# Patient Record
Sex: MALE | Race: WHITE | NOT HISPANIC OR LATINO | Employment: FULL TIME | URBAN - METROPOLITAN AREA
[De-identification: names, ages, dates, MRNs, and addresses within clinical notes are randomized per-mention and may not be internally consistent; named-entity substitution may affect disease eponyms.]

---

## 2017-07-12 ENCOUNTER — OFFICE VISIT (OUTPATIENT)
Dept: ORTHOPEDIC SURGERY | Facility: CLINIC | Age: 72
End: 2017-07-12

## 2017-07-12 VITALS — TEMPERATURE: 98.5 F | WEIGHT: 210 LBS | BODY MASS INDEX: 27.83 KG/M2 | HEIGHT: 73 IN

## 2017-07-12 DIAGNOSIS — M54.5 CHRONIC LOW BACK PAIN, UNSPECIFIED BACK PAIN LATERALITY, WITH SCIATICA PRESENCE UNSPECIFIED: Primary | ICD-10-CM

## 2017-07-12 DIAGNOSIS — G89.29 CHRONIC LOW BACK PAIN, UNSPECIFIED BACK PAIN LATERALITY, WITH SCIATICA PRESENCE UNSPECIFIED: Primary | ICD-10-CM

## 2017-07-12 PROCEDURE — 99204 OFFICE O/P NEW MOD 45 MIN: CPT | Performed by: ORTHOPAEDIC SURGERY

## 2017-07-12 RX ORDER — CARBAMAZEPINE 200 MG/1
100 TABLET ORAL 2 TIMES DAILY
COMMUNITY

## 2017-07-12 RX ORDER — PANTOPRAZOLE SODIUM 20 MG/1
40 TABLET, DELAYED RELEASE ORAL DAILY
COMMUNITY

## 2017-07-12 RX ORDER — ZOLPIDEM TARTRATE 5 MG/1
5 TABLET ORAL
COMMUNITY
End: 2017-08-08

## 2017-07-12 RX ORDER — CLOPIDOGREL BISULFATE 75 MG/1
75 TABLET ORAL DAILY
Status: ON HOLD | COMMUNITY
Start: 2016-12-12 | End: 2021-04-03

## 2017-07-12 RX ORDER — MODAFINIL 200 MG/1
200 TABLET ORAL AS NEEDED
COMMUNITY

## 2017-07-12 RX ORDER — SIMVASTATIN 40 MG
40 TABLET ORAL NIGHTLY
COMMUNITY

## 2017-07-12 NOTE — PROGRESS NOTES
"New patient or new problem visit    Chief Complaint   Patient presents with   • Lumbar Spine - Pain       HPI: Patient is a 72-year-old who complains of severe constant stabbing aching low back pain.  3 or 4 years ago he underwent a laminectomy and then one month later of fusion with instrumentation he states that the active pain started hurting after his first right hip replacement shortly before that.  He complains that the hip was 1 inch short and had to be revised.  In terms did not improve after the revision however.  Dr. Rivas did the  spinal surgery and apparently is no longer available.  He was told there was a \"screw loose\" in his back.  He is been on steroids for pain relief and [facility].  He complains that the pain is excruciating.  It's unclear if he had a \"honeymoon.  \"Initially following the's fusion surgery.    PFSH: See chart- reviewed.  He had a heart attack last December and underwent bypass and was told that he must remain on Plavix 1 year before any elective surgeries.  He needs to be seen by cardiology before any planned surgery to undergo a recommended echocardiogram.  [facility] in should call from today and yesterday.    Review of Systems   Constitutional: Negative for chills, fever and unexpected weight change.   HENT: Negative for trouble swallowing and voice change.    Eyes: Negative for visual disturbance.   Respiratory: Negative for cough and shortness of breath.    Cardiovascular: Negative for chest pain and leg swelling.   Gastrointestinal: Negative for abdominal pain, nausea and vomiting.   Endocrine: Negative for cold intolerance and heat intolerance.   Genitourinary: Negative for difficulty urinating, frequency and urgency.   Skin: Negative for rash and wound.   Allergic/Immunologic: Negative for immunocompromised state.   Neurological: Negative for weakness and numbness.   Hematological: Does not " bruise/bleed easily.   Psychiatric/Behavioral: Negative for dysphoric mood. The patient is not nervous/anxious.        PE: Constitutional: Vital signs above-noted.  Awake, alert and oriented    Psychiatric: Affect and insight do not appear grossly disturbed.  He seems insistestent on being understood that his pain is significant.    Pulmonary: Breathing is unlabored, color is good.    Skin: Warm, dry and normal turgor    Cardiac:  pedal pulses intact.  No edema.    Eyesight and hearing appear adequate for examination purposes    Musculoskeletal:  There is some tenderness to percussion and palpation of the spine. Motion appears stiff.  Posture is unremarkable to coronal and sagittal inspection.    The skin about the area is well healed.  There is no palpable or visible deformity.  There is no local spasm.       Neurologic:   Reflexes are 2+ and symmetrical in the patellae and absent in the Achilles.   Motor function is undisturbed in quadriceps, EHL, and gastrocnemius      Sensation appears symmetrically intact to light touch   .  In the bilateral lower extremities there is no evidence of atrophy.   Clonus is absent..  Gait appears undisturbed.  SLR test negative      MEDICAL DECISION MAKING    XRAY: plain film x-rays of the lumbar spine demonstrate an L3 to 5 fusion with pedicle screw instrumentation around which slight lucency's may be seen.  Slight scoliosis is noted.  I don't see a lot of demonstrable posterior lateral fusion bone.  Hardware appears to be well placed.  Extensive degenerative changes noted at the L2-3 disc space above.  Compared to 2016 images I don't see a substantial change.  CT scan of the lumbar spine suggest a solid facet fusion at L4-5 but I don't see a facet fusion at 3 for.  Again small lucencies around the hardware and noted but these in and of themselves are not dramatic nor necessarily indicative of a nonunion.  Significant stenosis noted.  He brought with him some hip films which show  total hip replacement on the right which appears to be unremarkable in appearance reviewed the radiologist reports which I presume [facility] and a different little from my own.      Other: n/a    Impression: Possible nonunion 34 looks more likely than 45 HEENT never seemed to have a dramatic early relief so it may simply be he's having pain from above below are both.    Plan: at some point an MRI scan may be useful for further evaluation.  He is been advised that he should not stop Plavix for non-emergency interventions until one year after surgery and I see no reason to go against this recommendation and told him I would not.  I believe this condition is stable and I would prefer to wait to order the lumbar MRI closer to the time at which he might be able to undergo intervention.  Presently I cannot help him and  I will see him back in 3 months.

## 2017-07-13 ENCOUNTER — TELEPHONE (OUTPATIENT)
Dept: ORTHOPEDIC SURGERY | Facility: CLINIC | Age: 72
End: 2017-07-13

## 2017-07-14 ENCOUNTER — TELEPHONE (OUTPATIENT)
Dept: ORTHOPEDIC SURGERY | Facility: CLINIC | Age: 72
End: 2017-07-14

## 2017-07-18 ENCOUNTER — TELEPHONE (OUTPATIENT)
Dept: ORTHOPEDIC SURGERY | Facility: CLINIC | Age: 72
End: 2017-07-18

## 2017-07-18 DIAGNOSIS — M54.5 CHRONIC LOW BACK PAIN, UNSPECIFIED BACK PAIN LATERALITY, WITH SCIATICA PRESENCE UNSPECIFIED: Primary | ICD-10-CM

## 2017-07-18 DIAGNOSIS — G89.29 CHRONIC LOW BACK PAIN, UNSPECIFIED BACK PAIN LATERALITY, WITH SCIATICA PRESENCE UNSPECIFIED: Primary | ICD-10-CM

## 2017-07-19 NOTE — TELEPHONE ENCOUNTER
Have contacted [facility] with the [facility] and spoke with the assistant.  I explained situation regarding this patient she has talked to the nurse practitioner and states that it is okay to take the patient off his Plavix for any length of time at our discretion.  As with Dr. Lopez and will go ahead and schedule the patient for an MRI with gadolinium and then will have Dr. Lopez contact the patient after the MRI.  All this information has been discussed with the patient and he agrees

## 2017-07-19 NOTE — TELEPHONE ENCOUNTER
"Call return to the patient.  He's very frustrated because he's having continued pain and is actually [facility] over the last few weeks.  He has discovered that if he lays on his right side he is able to tolerate lying in bed and isn't very little difficulty getting up and down however he is unable lay flat in the bed or on his left side.  Patient is insistent that he get his MRI done to see what's going on.  He would like to go ahead and schedule surgery as soon as possible.  I did question him about him being on the Plavix and the cardiologist recommendation that he remain on the Plavix until December.  Patient does not want to wait till December to have anything done.  \"I can't live like this\" \"I am willing to take any risk including death to get this pain taken care of\" have advised the patient after lengthy discussion that I would need to contact cardiologist to discuss with them about if it is safe to take him off his Plavix and then will discuss with Dr. Lopez.  I will get back with the patient as soon as possible.  "

## 2017-07-24 ENCOUNTER — HOSPITAL ENCOUNTER (OUTPATIENT)
Dept: MRI IMAGING | Facility: HOSPITAL | Age: 72
Discharge: HOME | End: 2017-07-24
Attending: ORTHOPAEDIC SURGERY | Admitting: ORTHOPAEDIC SURGERY

## 2017-07-24 DIAGNOSIS — M54.5 CHRONIC LOW BACK PAIN, UNSPECIFIED BACK PAIN LATERALITY, WITH SCIATICA PRESENCE UNSPECIFIED: ICD-10-CM

## 2017-07-24 DIAGNOSIS — G89.29 CHRONIC LOW BACK PAIN, UNSPECIFIED BACK PAIN LATERALITY, WITH SCIATICA PRESENCE UNSPECIFIED: ICD-10-CM

## 2017-07-24 PROCEDURE — 0 GADOBENATE DIMEGLUMINE 529 MG/ML SOLUTION: Performed by: ORTHOPAEDIC SURGERY

## 2017-07-24 PROCEDURE — 82565 ASSAY OF CREATININE: CPT

## 2017-07-24 PROCEDURE — 72158 MRI LUMBAR SPINE W/O & W/DYE: CPT

## 2017-07-24 PROCEDURE — A9577 INJ MULTIHANCE: HCPCS | Performed by: ORTHOPAEDIC SURGERY

## 2017-07-24 RX ADMIN — GADOBENATE DIMEGLUMINE 20 ML: 529 INJECTION, SOLUTION INTRAVENOUS at 17:00

## 2017-07-25 LAB — CREAT BLDA-MCNC: 1 MG/DL (ref 0.6–1.3)

## 2017-07-28 ENCOUNTER — TELEPHONE (OUTPATIENT)
Dept: ORTHOPEDIC SURGERY | Facility: CLINIC | Age: 72
End: 2017-07-28

## 2017-07-28 NOTE — TELEPHONE ENCOUNTER
----- Message from Marcello Lopez MD sent at 7/26/2017  5:16 PM EDT -----  MRI scan shows stenosis at L2-3 above the level of the fusion, and may represent a point from which leg pain is emanating.  Additionally I'm not sure that there is solid fusion at the previous fusion site at L3-4 and L4-5.  The schedule him for L2-3 repeat laminectomy and fusion with instrumentation, and removal of instrumentation L3 to 5 with exploration of fusion and repaired there if indicated.  I have not entered a case request.

## 2017-07-28 NOTE — TELEPHONE ENCOUNTER
Call return to the patient we discussed his MRI results and he would like to go ahead and proceed with surgery as soon as possible.

## 2017-07-30 ENCOUNTER — PREP FOR SURGERY (OUTPATIENT)
Dept: OTHER | Facility: HOSPITAL | Age: 72
End: 2017-07-30

## 2017-07-30 DIAGNOSIS — M48.061 SPINAL STENOSIS, LUMBAR REGION: Primary | ICD-10-CM

## 2017-07-30 RX ORDER — SODIUM CHLORIDE 0.9 % (FLUSH) 0.9 %
1-10 SYRINGE (ML) INJECTION AS NEEDED
Status: CANCELLED | OUTPATIENT
Start: 2017-08-21

## 2017-07-30 RX ORDER — SODIUM CHLORIDE, SODIUM LACTATE, POTASSIUM CHLORIDE, CALCIUM CHLORIDE 600; 310; 30; 20 MG/100ML; MG/100ML; MG/100ML; MG/100ML
100 INJECTION, SOLUTION INTRAVENOUS CONTINUOUS
Status: CANCELLED | OUTPATIENT
Start: 2017-08-21

## 2017-07-30 RX ORDER — CEFAZOLIN SODIUM 2 G/100ML
2 INJECTION, SOLUTION INTRAVENOUS ONCE
Status: CANCELLED | OUTPATIENT
Start: 2017-08-21 | End: 2017-07-30

## 2017-07-31 ENCOUNTER — TELEPHONE (OUTPATIENT)
Dept: ORTHOPEDIC SURGERY | Facility: CLINIC | Age: 72
End: 2017-07-31

## 2017-07-31 PROBLEM — M48.061 SPINAL STENOSIS, LUMBAR REGION: Status: ACTIVE | Noted: 2017-07-31

## 2017-07-31 NOTE — TELEPHONE ENCOUNTER
All the information regarding his surgery has been discussed with the patient.  He has been instructed to discontinue his Plavix 5 days prior to surgery with his last dose being on August 15

## 2017-08-05 DIAGNOSIS — Z87.891 FORMER SMOKER: ICD-10-CM

## 2017-08-05 DIAGNOSIS — M48.061 SPINAL STENOSIS OF LUMBAR REGION: Primary | ICD-10-CM

## 2017-08-08 ENCOUNTER — APPOINTMENT (OUTPATIENT)
Dept: PREADMISSION TESTING | Facility: HOSPITAL | Age: 72
End: 2017-08-08

## 2017-08-08 VITALS
HEIGHT: 73 IN | TEMPERATURE: 97.8 F | HEART RATE: 66 BPM | OXYGEN SATURATION: 98 % | SYSTOLIC BLOOD PRESSURE: 111 MMHG | WEIGHT: 201 LBS | BODY MASS INDEX: 26.64 KG/M2 | RESPIRATION RATE: 16 BRPM | DIASTOLIC BLOOD PRESSURE: 74 MMHG

## 2017-08-08 LAB
ANION GAP SERPL CALCULATED.3IONS-SCNC: 15.2 MMOL/L
BUN BLD-MCNC: 22 MG/DL (ref 8–23)
BUN/CREAT SERPL: 15.6 (ref 7–25)
CALCIUM SPEC-SCNC: 9.2 MG/DL (ref 8.6–10.5)
CHLORIDE SERPL-SCNC: 102 MMOL/L (ref 98–107)
CO2 SERPL-SCNC: 24.8 MMOL/L (ref 22–29)
CREAT BLD-MCNC: 1.41 MG/DL (ref 0.76–1.27)
DEPRECATED RDW RBC AUTO: 49.9 FL (ref 37–54)
ERYTHROCYTE [DISTWIDTH] IN BLOOD BY AUTOMATED COUNT: 15.5 % (ref 11.5–14.5)
GFR SERPL CREATININE-BSD FRML MDRD: 50 ML/MIN/1.73
GLUCOSE BLD-MCNC: 91 MG/DL (ref 65–99)
HCT VFR BLD AUTO: 40.4 % (ref 40.4–52.2)
HGB BLD-MCNC: 12.2 G/DL (ref 13.7–17.6)
MCH RBC QN AUTO: 26.9 PG (ref 27–32.7)
MCHC RBC AUTO-ENTMCNC: 30.2 G/DL (ref 32.6–36.4)
MCV RBC AUTO: 89.2 FL (ref 79.8–96.2)
PLATELET # BLD AUTO: 275 10*3/MM3 (ref 140–500)
PMV BLD AUTO: 8.7 FL (ref 6–12)
POTASSIUM BLD-SCNC: 4.6 MMOL/L (ref 3.5–5.2)
RBC # BLD AUTO: 4.53 10*6/MM3 (ref 4.6–6)
SODIUM BLD-SCNC: 142 MMOL/L (ref 136–145)
WBC NRBC COR # BLD: 5.24 10*3/MM3 (ref 4.5–10.7)

## 2017-08-08 PROCEDURE — 85027 COMPLETE CBC AUTOMATED: CPT | Performed by: ORTHOPAEDIC SURGERY

## 2017-08-08 PROCEDURE — 80048 BASIC METABOLIC PNL TOTAL CA: CPT | Performed by: ORTHOPAEDIC SURGERY

## 2017-08-08 PROCEDURE — 36415 COLL VENOUS BLD VENIPUNCTURE: CPT

## 2017-08-08 RX ORDER — FEXOFENADINE HCL 180 MG/1
180 TABLET ORAL DAILY
COMMUNITY

## 2017-08-08 RX ORDER — CHOLECALCIFEROL (VITAMIN D3) 125 MCG
500 CAPSULE ORAL DAILY
COMMUNITY

## 2017-08-08 RX ORDER — HYDROCODONE BITARTRATE AND ACETAMINOPHEN 5; 325 MG/1; MG/1
1 TABLET ORAL EVERY 6 HOURS PRN
COMMUNITY
End: 2017-08-25 | Stop reason: HOSPADM

## 2017-08-08 RX ORDER — TERAZOSIN 10 MG/1
10 CAPSULE ORAL NIGHTLY
COMMUNITY

## 2017-08-08 RX ORDER — DOXYCYCLINE HYCLATE 50 MG/1
50 TABLET, DELAYED RELEASE ORAL DAILY
COMMUNITY
End: 2021-04-14 | Stop reason: HOSPADM

## 2017-08-08 NOTE — DISCHARGE INSTRUCTIONS
Take the following medications the morning of surgery with a small sip of water: PANTOPROZOLE, TEGRETOL    ARRIVAL TIME  10:00        General Instructions:  • Do not eat solid food after midnight the night before surgery.  • You may drink clear liquids day of surgery but must stop at least one hour before your hospital arrival time.  • It is beneficial for you to have a clear drink that contains carbohydrates the day of surgery.  We suggest a 20 ounce bottle of Gatorade or Powerade for non-diabetic patients or a 20 ounce bottle of G2 or Powerade Zero for diabetic patients. (Pediatric patients, are not advised to drink a 20 ounce carbohydrate drink)    Clear liquids are liquids you can see through.  Nothing red in color.     Plain water                               Sports drinks  Sodas                                   Gelatin (Jell-O)  Fruit juices without pulp such as white grape juice and apple juice  Popsicles that contain no fruit or yogurt  Tea or coffee (no cream or milk added)  Gatorade / Powerade  G2 / Powerade Zero      • Patients who avoid smoking, chewing tobacco and alcohol for 4 weeks prior to surgery have a reduced risk of post-operative complications.  Quit smoking as many days before surgery as you can.  • Do not smoke, use chewing tobacco or drink alcohol the day of surgery.   • If applicable bring your C-PAP/ BI-PAP machine.  • Bring any papers given to you in the doctor’s office.  • Wear clean comfortable clothes and socks.  • Do not wear contact lenses or make-up.  Bring a case for your glasses.   • Bring crutches or walker if applicable.  • Leave all other valuables and jewelry at home.  • The Pre-Admission Testing nurse will instruct you to bring medications if unable to obtain an accurate list in Pre-Admission Testing.            Preventing a Surgical Site Infection:  • For 2 to 3 days before surgery, avoid shaving with a razor because the razor can irritate skin and make it easier to develop  an infection.  • The night prior to surgery sleep in a clean bed with clean clothing.  Do not allow pets to sleep with you.  • Shower on the morning of surgery using a fresh bar of anti-bacterial soap (such as Dial) and clean washcloth.  Dry with a clean towel and dress in clean clothing.  • Make sure you, your family, and all healthcare providers clean their hands with soap and water or an alcohol based hand  before caring for you or your wound.    Day of surgery:  Upon arrival, a Pre-op nurse and Anesthesiologist will review your health history, obtain vital signs, and answer questions you may have.  The only belongings needed at this time will be your home medications and if applicable your C-PAP/BI-PAP machine.  If you are staying overnight your family can leave the rest of your belongings in the car and bring them to your room later.  A Pre-op nurse will start an IV and you may receive medication in preparation for surgery, including something to help you relax.  Your family will be able to see you in the Pre-op area.  While you are in surgery your family should notify the waiting room  if they leave the waiting room area and provide a contact phone number.    Please be aware that surgery does come with discomfort.  We want to make every effort to control your discomfort so please discuss any uncontrolled symptoms with your nurse.   Your doctor will most likely have prescribed pain medications.      If you are going home after surgery you will receive individualized written care instructions before being discharged.  A responsible adult must drive you to and from the hospital on the day of your surgery and stay with you for 24 hours.    If you are staying overnight following surgery, you will be transported to your hospital room following the recovery period.  [facility] has all private rooms.    If you have any questions please call Pre-Admission Testing at  [phone].  Deductibles and co-payments are collected on the day of service. Please be prepared to pay the required co-pay, deductible or deposit on the day of service as defined by your plan.

## 2017-08-21 ENCOUNTER — ANESTHESIA (OUTPATIENT)
Dept: PERIOP | Facility: HOSPITAL | Age: 72
End: 2017-08-21

## 2017-08-21 ENCOUNTER — HOSPITAL ENCOUNTER (INPATIENT)
Facility: HOSPITAL | Age: 72
LOS: 4 days | Discharge: HOME HEALTH | End: 2017-08-25
Attending: ORTHOPAEDIC SURGERY | Admitting: ORTHOPAEDIC SURGERY

## 2017-08-21 ENCOUNTER — APPOINTMENT (OUTPATIENT)
Dept: GENERAL RADIOLOGY | Facility: HOSPITAL | Age: 72
End: 2017-08-21

## 2017-08-21 ENCOUNTER — ANESTHESIA EVENT (OUTPATIENT)
Dept: PERIOP | Facility: HOSPITAL | Age: 72
End: 2017-08-21

## 2017-08-21 DIAGNOSIS — D50.9 IRON DEFICIENCY ANEMIA, UNSPECIFIED IRON DEFICIENCY ANEMIA TYPE: ICD-10-CM

## 2017-08-21 DIAGNOSIS — M48.061 SPINAL STENOSIS, LUMBAR REGION: ICD-10-CM

## 2017-08-21 DIAGNOSIS — M48.061 SPINAL STENOSIS OF LUMBAR REGION: ICD-10-CM

## 2017-08-21 DIAGNOSIS — R53.1 GENERALIZED WEAKNESS: Primary | ICD-10-CM

## 2017-08-21 LAB
ABO GROUP BLD: NORMAL
BLD GP AB SCN SERPL QL: NEGATIVE
HCT VFR BLD AUTO: 34.8 % (ref 40.4–52.2)
HGB BLD-MCNC: 10.9 G/DL (ref 13.7–17.6)
RH BLD: NEGATIVE

## 2017-08-21 PROCEDURE — 25010000003 CEFAZOLIN IN DEXTROSE 2-4 GM/100ML-% SOLUTION: Performed by: ORTHOPAEDIC SURGERY

## 2017-08-21 PROCEDURE — C1713 ANCHOR/SCREW BN/BN,TIS/BN: HCPCS | Performed by: ORTHOPAEDIC SURGERY

## 2017-08-21 PROCEDURE — 20936 SP BONE AGRFT LOCAL ADD-ON: CPT | Performed by: ORTHOPAEDIC SURGERY

## 2017-08-21 PROCEDURE — 25010000002 MIDAZOLAM PER 1 MG: Performed by: ANESTHESIOLOGY

## 2017-08-21 PROCEDURE — 20930 SP BONE ALGRFT MORSEL ADD-ON: CPT | Performed by: ORTHOPAEDIC SURGERY

## 2017-08-21 PROCEDURE — 86901 BLOOD TYPING SEROLOGIC RH(D): CPT | Performed by: ORTHOPAEDIC SURGERY

## 2017-08-21 PROCEDURE — 85014 HEMATOCRIT: CPT | Performed by: ORTHOPAEDIC SURGERY

## 2017-08-21 PROCEDURE — 25810000003 POTASSIUM CHLORIDE PER 2 MEQ: Performed by: ORTHOPAEDIC SURGERY

## 2017-08-21 PROCEDURE — 25010000002 DEXAMETHASONE PER 1 MG: Performed by: ANESTHESIOLOGY

## 2017-08-21 PROCEDURE — 72100 X-RAY EXAM L-S SPINE 2/3 VWS: CPT

## 2017-08-21 PROCEDURE — 25010000002 FENTANYL CITRATE (PF) 100 MCG/2ML SOLUTION: Performed by: ANESTHESIOLOGY

## 2017-08-21 PROCEDURE — 86900 BLOOD TYPING SEROLOGIC ABO: CPT | Performed by: ORTHOPAEDIC SURGERY

## 2017-08-21 PROCEDURE — 38220 DX BONE MARROW ASPIRATIONS: CPT | Performed by: ORTHOPAEDIC SURGERY

## 2017-08-21 PROCEDURE — 25010000003 CEFAZOLIN PER 500 MG: Performed by: ORTHOPAEDIC SURGERY

## 2017-08-21 PROCEDURE — 25010000002 HYDROMORPHONE PER 4 MG: Performed by: ANESTHESIOLOGY

## 2017-08-21 PROCEDURE — 76000 FLUOROSCOPY <1 HR PHYS/QHP: CPT

## 2017-08-21 PROCEDURE — 22634 ARTHRD CMBN 1NTRSPC EA ADDL: CPT | Performed by: ORTHOPAEDIC SURGERY

## 2017-08-21 PROCEDURE — 25010000002 NEOSTIGMINE PER 0.5 MG: Performed by: ANESTHESIOLOGY

## 2017-08-21 PROCEDURE — 22614 ARTHRD PST TQ 1NTRSPC EA ADD: CPT | Performed by: ORTHOPAEDIC SURGERY

## 2017-08-21 PROCEDURE — 0SG10AJ FUSION OF 2 OR MORE LUMBAR VERTEBRAL JOINTS WITH INTERBODY FUSION DEVICE, POSTERIOR APPROACH, ANTERIOR COLUMN, OPEN APPROACH: ICD-10-PCS | Performed by: ORTHOPAEDIC SURGERY

## 2017-08-21 PROCEDURE — S0260 H&P FOR SURGERY: HCPCS | Performed by: ORTHOPAEDIC SURGERY

## 2017-08-21 PROCEDURE — 0QP004Z REMOVAL OF INTERNAL FIXATION DEVICE FROM LUMBAR VERTEBRA, OPEN APPROACH: ICD-10-PCS | Performed by: ORTHOPAEDIC SURGERY

## 2017-08-21 PROCEDURE — 22842 INSERT SPINE FIXATION DEVICE: CPT | Performed by: ORTHOPAEDIC SURGERY

## 2017-08-21 PROCEDURE — 87075 CULTR BACTERIA EXCEPT BLOOD: CPT | Performed by: ORTHOPAEDIC SURGERY

## 2017-08-21 PROCEDURE — 25010000002 ONDANSETRON PER 1 MG: Performed by: ANESTHESIOLOGY

## 2017-08-21 PROCEDURE — 85018 HEMOGLOBIN: CPT | Performed by: ORTHOPAEDIC SURGERY

## 2017-08-21 PROCEDURE — 86850 RBC ANTIBODY SCREEN: CPT | Performed by: ORTHOPAEDIC SURGERY

## 2017-08-21 PROCEDURE — 22633 ARTHRD CMBN 1NTRSPC LUMBAR: CPT | Performed by: ORTHOPAEDIC SURGERY

## 2017-08-21 PROCEDURE — 07DR3ZZ EXTRACTION OF ILIAC BONE MARROW, PERCUTANEOUS APPROACH: ICD-10-PCS | Performed by: ORTHOPAEDIC SURGERY

## 2017-08-21 PROCEDURE — 25010000002 PROPOFOL 10 MG/ML EMULSION: Performed by: ANESTHESIOLOGY

## 2017-08-21 PROCEDURE — 93010 ELECTROCARDIOGRAM REPORT: CPT | Performed by: INTERNAL MEDICINE

## 2017-08-21 PROCEDURE — 25010000002 HEPARIN (PORCINE) PER 1000 UNITS: Performed by: ORTHOPAEDIC SURGERY

## 2017-08-21 PROCEDURE — 93005 ELECTROCARDIOGRAM TRACING: CPT | Performed by: ORTHOPAEDIC SURGERY

## 2017-08-21 PROCEDURE — 22853 INSJ BIOMECHANICAL DEVICE: CPT | Performed by: ORTHOPAEDIC SURGERY

## 2017-08-21 PROCEDURE — 25010000002 PHENYLEPHRINE PER 1 ML: Performed by: ANESTHESIOLOGY

## 2017-08-21 PROCEDURE — 94799 UNLISTED PULMONARY SVC/PX: CPT

## 2017-08-21 DEVICE — ROD PREBNT SPINE EXPEDIUM TI 5.5X95MM: Type: IMPLANTABLE DEVICE | Site: SPINE LUMBAR | Status: FUNCTIONAL

## 2017-08-21 DEVICE — ALLOGRFT BONE VIVIGEN CELLUAR MATRX FZ 10CC: Type: IMPLANTABLE DEVICE | Site: SPINE LUMBAR | Status: FUNCTIONAL

## 2017-08-21 DEVICE — SCRW INNR EXPEDIUM: Type: IMPLANTABLE DEVICE | Site: SPINE LUMBAR | Status: FUNCTIONAL

## 2017-08-21 DEVICE — SCRW EXPEDIUM PA TI 7.5X45MM: Type: IMPLANTABLE DEVICE | Site: SPINE LUMBAR | Status: FUNCTIONAL

## 2017-08-21 DEVICE — SCRW EXPEDIUM PA TI 7X45MM: Type: IMPLANTABLE DEVICE | Site: SPINE LUMBAR | Status: FUNCTIONAL

## 2017-08-21 DEVICE — SCRW EXPEDIUM PA TI 7X50MM: Type: IMPLANTABLE DEVICE | Site: SPINE LUMBAR | Status: FUNCTIONAL

## 2017-08-21 DEVICE — IMPLANTABLE DEVICE: Type: IMPLANTABLE DEVICE | Site: SPINE LUMBAR | Status: FUNCTIONAL

## 2017-08-21 DEVICE — CAGE CONCORDE BULLET LRD 9X10X21MM: Type: IMPLANTABLE DEVICE | Site: SPINE LUMBAR | Status: FUNCTIONAL

## 2017-08-21 RX ORDER — BISACODYL 10 MG
10 SUPPOSITORY, RECTAL RECTAL DAILY PRN
Status: DISCONTINUED | OUTPATIENT
Start: 2017-08-21 | End: 2017-08-25 | Stop reason: HOSPADM

## 2017-08-21 RX ORDER — POLYETHYLENE GLYCOL 3350 17 G/17G
17 POWDER, FOR SOLUTION ORAL DAILY PRN
Status: DISCONTINUED | OUTPATIENT
Start: 2017-08-21 | End: 2017-08-25 | Stop reason: HOSPADM

## 2017-08-21 RX ORDER — FENTANYL CITRATE 50 UG/ML
50 INJECTION, SOLUTION INTRAMUSCULAR; INTRAVENOUS
Status: DISCONTINUED | OUTPATIENT
Start: 2017-08-21 | End: 2017-08-21 | Stop reason: HOSPADM

## 2017-08-21 RX ORDER — NALOXONE HCL 0.4 MG/ML
0.2 VIAL (ML) INJECTION AS NEEDED
Status: DISCONTINUED | OUTPATIENT
Start: 2017-08-21 | End: 2017-08-21 | Stop reason: HOSPADM

## 2017-08-21 RX ORDER — ONDANSETRON 2 MG/ML
INJECTION INTRAMUSCULAR; INTRAVENOUS AS NEEDED
Status: DISCONTINUED | OUTPATIENT
Start: 2017-08-21 | End: 2017-08-21 | Stop reason: SURG

## 2017-08-21 RX ORDER — CEFAZOLIN SODIUM 2 G/100ML
2 INJECTION, SOLUTION INTRAVENOUS ONCE
Status: COMPLETED | OUTPATIENT
Start: 2017-08-21 | End: 2017-08-21

## 2017-08-21 RX ORDER — LIDOCAINE HYDROCHLORIDE 10 MG/ML
0.5 INJECTION, SOLUTION EPIDURAL; INFILTRATION; INTRACAUDAL; PERINEURAL ONCE AS NEEDED
Status: DISCONTINUED | OUTPATIENT
Start: 2017-08-21 | End: 2017-08-21 | Stop reason: HOSPADM

## 2017-08-21 RX ORDER — MIDAZOLAM HYDROCHLORIDE 1 MG/ML
2 INJECTION INTRAMUSCULAR; INTRAVENOUS
Status: DISCONTINUED | OUTPATIENT
Start: 2017-08-21 | End: 2017-08-21 | Stop reason: HOSPADM

## 2017-08-21 RX ORDER — HYDROMORPHONE HCL 110MG/55ML
PATIENT CONTROLLED ANALGESIA SYRINGE INTRAVENOUS AS NEEDED
Status: DISCONTINUED | OUTPATIENT
Start: 2017-08-21 | End: 2017-08-21 | Stop reason: SURG

## 2017-08-21 RX ORDER — EPHEDRINE SULFATE 50 MG/ML
INJECTION, SOLUTION INTRAVENOUS AS NEEDED
Status: DISCONTINUED | OUTPATIENT
Start: 2017-08-21 | End: 2017-08-21 | Stop reason: SURG

## 2017-08-21 RX ORDER — MIDAZOLAM HYDROCHLORIDE 1 MG/ML
1 INJECTION INTRAMUSCULAR; INTRAVENOUS
Status: DISCONTINUED | OUTPATIENT
Start: 2017-08-21 | End: 2017-08-21 | Stop reason: HOSPADM

## 2017-08-21 RX ORDER — ONDANSETRON 2 MG/ML
4 INJECTION INTRAMUSCULAR; INTRAVENOUS ONCE AS NEEDED
Status: DISCONTINUED | OUTPATIENT
Start: 2017-08-21 | End: 2017-08-21 | Stop reason: HOSPADM

## 2017-08-21 RX ORDER — SODIUM CHLORIDE 0.9 % (FLUSH) 0.9 %
1-10 SYRINGE (ML) INJECTION AS NEEDED
Status: DISCONTINUED | OUTPATIENT
Start: 2017-08-21 | End: 2017-08-21 | Stop reason: HOSPADM

## 2017-08-21 RX ORDER — TERAZOSIN 5 MG/1
5 CAPSULE ORAL NIGHTLY
Status: DISCONTINUED | OUTPATIENT
Start: 2017-08-22 | End: 2017-08-25 | Stop reason: HOSPADM

## 2017-08-21 RX ORDER — SENNA AND DOCUSATE SODIUM 50; 8.6 MG/1; MG/1
1 TABLET, FILM COATED ORAL 2 TIMES DAILY
Status: DISCONTINUED | OUTPATIENT
Start: 2017-08-21 | End: 2017-08-25 | Stop reason: HOSPADM

## 2017-08-21 RX ORDER — DIPHENHYDRAMINE HYDROCHLORIDE 50 MG/ML
12.5 INJECTION INTRAMUSCULAR; INTRAVENOUS
Status: DISCONTINUED | OUTPATIENT
Start: 2017-08-21 | End: 2017-08-21 | Stop reason: HOSPADM

## 2017-08-21 RX ORDER — CARBAMAZEPINE 200 MG/1
200 TABLET ORAL
Status: DISCONTINUED | OUTPATIENT
Start: 2017-08-22 | End: 2017-08-23

## 2017-08-21 RX ORDER — PROMETHAZINE HYDROCHLORIDE 25 MG/1
25 TABLET ORAL ONCE AS NEEDED
Status: DISCONTINUED | OUTPATIENT
Start: 2017-08-21 | End: 2017-08-21 | Stop reason: HOSPADM

## 2017-08-21 RX ORDER — TEMAZEPAM 15 MG/1
15 CAPSULE ORAL NIGHTLY PRN
Status: DISCONTINUED | OUTPATIENT
Start: 2017-08-21 | End: 2017-08-25 | Stop reason: HOSPADM

## 2017-08-21 RX ORDER — LABETALOL HYDROCHLORIDE 5 MG/ML
5 INJECTION, SOLUTION INTRAVENOUS
Status: DISCONTINUED | OUTPATIENT
Start: 2017-08-21 | End: 2017-08-21 | Stop reason: HOSPADM

## 2017-08-21 RX ORDER — PROMETHAZINE HYDROCHLORIDE 25 MG/1
12.5 TABLET ORAL ONCE AS NEEDED
Status: DISCONTINUED | OUTPATIENT
Start: 2017-08-21 | End: 2017-08-21 | Stop reason: HOSPADM

## 2017-08-21 RX ORDER — SODIUM CHLORIDE, SODIUM LACTATE, POTASSIUM CHLORIDE, CALCIUM CHLORIDE 600; 310; 30; 20 MG/100ML; MG/100ML; MG/100ML; MG/100ML
9 INJECTION, SOLUTION INTRAVENOUS CONTINUOUS
Status: DISCONTINUED | OUTPATIENT
Start: 2017-08-21 | End: 2017-08-22

## 2017-08-21 RX ORDER — PROPOFOL 10 MG/ML
VIAL (ML) INTRAVENOUS AS NEEDED
Status: DISCONTINUED | OUTPATIENT
Start: 2017-08-21 | End: 2017-08-21 | Stop reason: SURG

## 2017-08-21 RX ORDER — HYDROCODONE BITARTRATE AND ACETAMINOPHEN 7.5; 325 MG/1; MG/1
1 TABLET ORAL ONCE AS NEEDED
Status: DISCONTINUED | OUTPATIENT
Start: 2017-08-21 | End: 2017-08-21 | Stop reason: HOSPADM

## 2017-08-21 RX ORDER — PROMETHAZINE HYDROCHLORIDE 25 MG/1
25 SUPPOSITORY RECTAL ONCE AS NEEDED
Status: DISCONTINUED | OUTPATIENT
Start: 2017-08-21 | End: 2017-08-21 | Stop reason: HOSPADM

## 2017-08-21 RX ORDER — CEFAZOLIN SODIUM 2 G/100ML
2 INJECTION, SOLUTION INTRAVENOUS EVERY 8 HOURS
Status: COMPLETED | OUTPATIENT
Start: 2017-08-21 | End: 2017-08-22

## 2017-08-21 RX ORDER — FENTANYL CITRATE 50 UG/ML
100 INJECTION, SOLUTION INTRAMUSCULAR; INTRAVENOUS
Status: DISCONTINUED | OUTPATIENT
Start: 2017-08-21 | End: 2017-08-21 | Stop reason: HOSPADM

## 2017-08-21 RX ORDER — HYDROMORPHONE HCL IN 0.9% NACL 10 MG/50ML
PATIENT CONTROLLED ANALGESIA SYRINGE INTRAVENOUS
Status: COMPLETED
Start: 2017-08-21 | End: 2017-08-21

## 2017-08-21 RX ORDER — CYCLOBENZAPRINE HCL 10 MG
10 TABLET ORAL 3 TIMES DAILY PRN
Status: DISCONTINUED | OUTPATIENT
Start: 2017-08-21 | End: 2017-08-25

## 2017-08-21 RX ORDER — ONDANSETRON 4 MG/1
4 TABLET, FILM COATED ORAL EVERY 6 HOURS PRN
Status: DISCONTINUED | OUTPATIENT
Start: 2017-08-21 | End: 2017-08-25 | Stop reason: HOSPADM

## 2017-08-21 RX ORDER — SODIUM CHLORIDE, SODIUM LACTATE, POTASSIUM CHLORIDE, CALCIUM CHLORIDE 600; 310; 30; 20 MG/100ML; MG/100ML; MG/100ML; MG/100ML
100 INJECTION, SOLUTION INTRAVENOUS CONTINUOUS
Status: DISCONTINUED | OUTPATIENT
Start: 2017-08-21 | End: 2017-08-22

## 2017-08-21 RX ORDER — OXYCODONE AND ACETAMINOPHEN 7.5; 325 MG/1; MG/1
1 TABLET ORAL ONCE AS NEEDED
Status: DISCONTINUED | OUTPATIENT
Start: 2017-08-21 | End: 2017-08-21 | Stop reason: HOSPADM

## 2017-08-21 RX ORDER — PROMETHAZINE HYDROCHLORIDE 25 MG/ML
12.5 INJECTION, SOLUTION INTRAMUSCULAR; INTRAVENOUS ONCE AS NEEDED
Status: DISCONTINUED | OUTPATIENT
Start: 2017-08-21 | End: 2017-08-21 | Stop reason: HOSPADM

## 2017-08-21 RX ORDER — MAGNESIUM HYDROXIDE 1200 MG/15ML
LIQUID ORAL AS NEEDED
Status: DISCONTINUED | OUTPATIENT
Start: 2017-08-21 | End: 2017-08-21 | Stop reason: HOSPADM

## 2017-08-21 RX ORDER — DEXAMETHASONE SODIUM PHOSPHATE 10 MG/ML
INJECTION INTRAMUSCULAR; INTRAVENOUS AS NEEDED
Status: DISCONTINUED | OUTPATIENT
Start: 2017-08-21 | End: 2017-08-21 | Stop reason: SURG

## 2017-08-21 RX ORDER — GLYCOPYRROLATE 0.2 MG/ML
INJECTION INTRAMUSCULAR; INTRAVENOUS AS NEEDED
Status: DISCONTINUED | OUTPATIENT
Start: 2017-08-21 | End: 2017-08-21 | Stop reason: SURG

## 2017-08-21 RX ORDER — SODIUM CHLORIDE 0.9 % (FLUSH) 0.9 %
1-10 SYRINGE (ML) INJECTION AS NEEDED
Status: DISCONTINUED | OUTPATIENT
Start: 2017-08-21 | End: 2017-08-25 | Stop reason: HOSPADM

## 2017-08-21 RX ORDER — ONDANSETRON 2 MG/ML
4 INJECTION INTRAMUSCULAR; INTRAVENOUS EVERY 6 HOURS PRN
Status: DISCONTINUED | OUTPATIENT
Start: 2017-08-21 | End: 2017-08-25 | Stop reason: HOSPADM

## 2017-08-21 RX ORDER — OXYCODONE HYDROCHLORIDE AND ACETAMINOPHEN 5; 325 MG/1; MG/1
2 TABLET ORAL EVERY 4 HOURS PRN
Status: DISCONTINUED | OUTPATIENT
Start: 2017-08-21 | End: 2017-08-23

## 2017-08-21 RX ORDER — HYDROMORPHONE HCL IN 0.9% NACL 10 MG/50ML
PATIENT CONTROLLED ANALGESIA SYRINGE INTRAVENOUS CONTINUOUS
Status: DISCONTINUED | OUTPATIENT
Start: 2017-08-21 | End: 2017-08-23

## 2017-08-21 RX ORDER — ONDANSETRON 4 MG/1
4 TABLET, ORALLY DISINTEGRATING ORAL EVERY 6 HOURS PRN
Status: DISCONTINUED | OUTPATIENT
Start: 2017-08-21 | End: 2017-08-25 | Stop reason: HOSPADM

## 2017-08-21 RX ORDER — HYDRALAZINE HYDROCHLORIDE 20 MG/ML
5 INJECTION INTRAMUSCULAR; INTRAVENOUS
Status: DISCONTINUED | OUTPATIENT
Start: 2017-08-21 | End: 2017-08-21 | Stop reason: HOSPADM

## 2017-08-21 RX ORDER — LIDOCAINE HYDROCHLORIDE 20 MG/ML
INJECTION, SOLUTION INFILTRATION; PERINEURAL AS NEEDED
Status: DISCONTINUED | OUTPATIENT
Start: 2017-08-21 | End: 2017-08-21 | Stop reason: SURG

## 2017-08-21 RX ORDER — NALOXONE HCL 0.4 MG/ML
0.1 VIAL (ML) INJECTION
Status: DISCONTINUED | OUTPATIENT
Start: 2017-08-21 | End: 2017-08-23

## 2017-08-21 RX ORDER — ATORVASTATIN CALCIUM 20 MG/1
20 TABLET, FILM COATED ORAL DAILY
Status: DISCONTINUED | OUTPATIENT
Start: 2017-08-22 | End: 2017-08-22

## 2017-08-21 RX ORDER — FLUMAZENIL 0.1 MG/ML
0.2 INJECTION INTRAVENOUS AS NEEDED
Status: DISCONTINUED | OUTPATIENT
Start: 2017-08-21 | End: 2017-08-21 | Stop reason: HOSPADM

## 2017-08-21 RX ORDER — PANTOPRAZOLE SODIUM 40 MG/1
40 TABLET, DELAYED RELEASE ORAL
Status: DISCONTINUED | OUTPATIENT
Start: 2017-08-22 | End: 2017-08-23

## 2017-08-21 RX ORDER — SODIUM CHLORIDE AND POTASSIUM CHLORIDE 150; 450 MG/100ML; MG/100ML
100 INJECTION, SOLUTION INTRAVENOUS CONTINUOUS
Status: DISCONTINUED | OUTPATIENT
Start: 2017-08-21 | End: 2017-08-22

## 2017-08-21 RX ORDER — ROCURONIUM BROMIDE 10 MG/ML
INJECTION, SOLUTION INTRAVENOUS AS NEEDED
Status: DISCONTINUED | OUTPATIENT
Start: 2017-08-21 | End: 2017-08-21 | Stop reason: SURG

## 2017-08-21 RX ORDER — HYDROMORPHONE HYDROCHLORIDE 1 MG/ML
0.5 INJECTION, SOLUTION INTRAMUSCULAR; INTRAVENOUS; SUBCUTANEOUS
Status: DISCONTINUED | OUTPATIENT
Start: 2017-08-21 | End: 2017-08-21 | Stop reason: HOSPADM

## 2017-08-21 RX ORDER — FAMOTIDINE 10 MG/ML
20 INJECTION, SOLUTION INTRAVENOUS ONCE
Status: COMPLETED | OUTPATIENT
Start: 2017-08-21 | End: 2017-08-21

## 2017-08-21 RX ORDER — EPHEDRINE SULFATE 50 MG/ML
5 INJECTION, SOLUTION INTRAVENOUS ONCE AS NEEDED
Status: DISCONTINUED | OUTPATIENT
Start: 2017-08-21 | End: 2017-08-21 | Stop reason: HOSPADM

## 2017-08-21 RX ADMIN — SODIUM CHLORIDE, POTASSIUM CHLORIDE, SODIUM LACTATE AND CALCIUM CHLORIDE: 600; 310; 30; 20 INJECTION, SOLUTION INTRAVENOUS at 13:26

## 2017-08-21 RX ADMIN — PHENYLEPHRINE HYDROCHLORIDE 100 MCG: 10 INJECTION INTRAVENOUS at 13:45

## 2017-08-21 RX ADMIN — FENTANYL CITRATE 50 MCG: 50 INJECTION INTRAMUSCULAR; INTRAVENOUS at 15:56

## 2017-08-21 RX ADMIN — PHENYLEPHRINE HYDROCHLORIDE 100 MCG: 10 INJECTION INTRAVENOUS at 16:20

## 2017-08-21 RX ADMIN — EPHEDRINE SULFATE 10 MG: 50 INJECTION INTRAMUSCULAR; INTRAVENOUS; SUBCUTANEOUS at 13:45

## 2017-08-21 RX ADMIN — FAMOTIDINE 20 MG: 10 INJECTION INTRAVENOUS at 11:01

## 2017-08-21 RX ADMIN — SODIUM CHLORIDE, POTASSIUM CHLORIDE, SODIUM LACTATE AND CALCIUM CHLORIDE: 600; 310; 30; 20 INJECTION, SOLUTION INTRAVENOUS at 14:10

## 2017-08-21 RX ADMIN — Medication: at 18:44

## 2017-08-21 RX ADMIN — ROCURONIUM BROMIDE 50 MG: 10 INJECTION INTRAVENOUS at 13:30

## 2017-08-21 RX ADMIN — CEFAZOLIN SODIUM 2 G: 2 INJECTION, SOLUTION INTRAVENOUS at 23:29

## 2017-08-21 RX ADMIN — ROCURONIUM BROMIDE 20 MG: 10 INJECTION INTRAVENOUS at 15:45

## 2017-08-21 RX ADMIN — SODIUM CHLORIDE, POTASSIUM CHLORIDE, SODIUM LACTATE AND CALCIUM CHLORIDE: 600; 310; 30; 20 INJECTION, SOLUTION INTRAVENOUS at 14:05

## 2017-08-21 RX ADMIN — PHENYLEPHRINE HYDROCHLORIDE 100 MCG: 10 INJECTION INTRAVENOUS at 13:44

## 2017-08-21 RX ADMIN — MIDAZOLAM 1 MG: 1 INJECTION INTRAMUSCULAR; INTRAVENOUS at 11:02

## 2017-08-21 RX ADMIN — HYDROMORPHONE HYDROCHLORIDE 0.5 MG: 2 INJECTION, SOLUTION INTRAMUSCULAR; INTRAVENOUS; SUBCUTANEOUS at 16:44

## 2017-08-21 RX ADMIN — POTASSIUM CHLORIDE AND SODIUM CHLORIDE 100 ML/HR: 450; 150 INJECTION, SOLUTION INTRAVENOUS at 23:29

## 2017-08-21 RX ADMIN — ROCURONIUM BROMIDE 10 MG: 10 INJECTION INTRAVENOUS at 16:22

## 2017-08-21 RX ADMIN — SODIUM CHLORIDE, POTASSIUM CHLORIDE, SODIUM LACTATE AND CALCIUM CHLORIDE 9 ML/HR: 600; 310; 30; 20 INJECTION, SOLUTION INTRAVENOUS at 11:01

## 2017-08-21 RX ADMIN — FENTANYL CITRATE 50 MCG: 50 INJECTION INTRAMUSCULAR; INTRAVENOUS at 13:19

## 2017-08-21 RX ADMIN — ROCURONIUM BROMIDE 10 MG: 10 INJECTION INTRAVENOUS at 17:07

## 2017-08-21 RX ADMIN — FENTANYL CITRATE 50 MCG: 50 INJECTION INTRAMUSCULAR; INTRAVENOUS at 19:07

## 2017-08-21 RX ADMIN — ROCURONIUM BROMIDE 10 MG: 10 INJECTION INTRAVENOUS at 16:43

## 2017-08-21 RX ADMIN — NEOSTIGMINE METHYLSULFATE 3 MG: 1 INJECTION INTRAMUSCULAR; INTRAVENOUS; SUBCUTANEOUS at 17:44

## 2017-08-21 RX ADMIN — DEXAMETHASONE SODIUM PHOSPHATE 8 MG: 10 INJECTION INTRAMUSCULAR; INTRAVENOUS at 14:00

## 2017-08-21 RX ADMIN — FENTANYL CITRATE 100 MCG: 50 INJECTION INTRAMUSCULAR; INTRAVENOUS at 13:30

## 2017-08-21 RX ADMIN — ROCURONIUM BROMIDE 30 MG: 10 INJECTION INTRAVENOUS at 14:22

## 2017-08-21 RX ADMIN — OXYCODONE HYDROCHLORIDE AND ACETAMINOPHEN 2 TABLET: 5; 325 TABLET ORAL at 21:08

## 2017-08-21 RX ADMIN — LIDOCAINE HYDROCHLORIDE 50 MG: 20 INJECTION, SOLUTION INFILTRATION; PERINEURAL at 13:30

## 2017-08-21 RX ADMIN — EPHEDRINE SULFATE 10 MG: 50 INJECTION INTRAMUSCULAR; INTRAVENOUS; SUBCUTANEOUS at 13:50

## 2017-08-21 RX ADMIN — HYDROMORPHONE HYDROCHLORIDE 0.5 MG: 1 INJECTION, SOLUTION INTRAMUSCULAR; INTRAVENOUS; SUBCUTANEOUS at 19:31

## 2017-08-21 RX ADMIN — HYDROMORPHONE HYDROCHLORIDE 0.5 MG: 2 INJECTION, SOLUTION INTRAMUSCULAR; INTRAVENOUS; SUBCUTANEOUS at 17:43

## 2017-08-21 RX ADMIN — FENTANYL CITRATE 100 MCG: 50 INJECTION INTRAMUSCULAR; INTRAVENOUS at 14:21

## 2017-08-21 RX ADMIN — FENTANYL CITRATE 50 MCG: 50 INJECTION INTRAMUSCULAR; INTRAVENOUS at 18:43

## 2017-08-21 RX ADMIN — GLYCOPYRROLATE 0.4 MG: 0.2 INJECTION INTRAMUSCULAR; INTRAVENOUS at 17:44

## 2017-08-21 RX ADMIN — HYDROMORPHONE HYDROCHLORIDE 0.25 MG: 2 INJECTION, SOLUTION INTRAMUSCULAR; INTRAVENOUS; SUBCUTANEOUS at 17:28

## 2017-08-21 RX ADMIN — SODIUM CHLORIDE, POTASSIUM CHLORIDE, SODIUM LACTATE AND CALCIUM CHLORIDE: 600; 310; 30; 20 INJECTION, SOLUTION INTRAVENOUS at 15:05

## 2017-08-21 RX ADMIN — PROPOFOL 200 MG: 10 INJECTION, EMULSION INTRAVENOUS at 13:30

## 2017-08-21 RX ADMIN — EPHEDRINE SULFATE 10 MG: 50 INJECTION INTRAMUSCULAR; INTRAVENOUS; SUBCUTANEOUS at 13:41

## 2017-08-21 RX ADMIN — ONDANSETRON 4 MG: 2 INJECTION INTRAMUSCULAR; INTRAVENOUS at 17:39

## 2017-08-21 RX ADMIN — PHENYLEPHRINE HYDROCHLORIDE 100 MCG: 10 INJECTION INTRAVENOUS at 16:11

## 2017-08-21 RX ADMIN — HYDROMORPHONE HYDROCHLORIDE 0.25 MG: 2 INJECTION, SOLUTION INTRAMUSCULAR; INTRAVENOUS; SUBCUTANEOUS at 17:08

## 2017-08-21 RX ADMIN — CEFAZOLIN SODIUM 2 G: 2 INJECTION, SOLUTION INTRAVENOUS at 13:26

## 2017-08-21 RX ADMIN — PHENYLEPHRINE HYDROCHLORIDE 100 MCG: 10 INJECTION INTRAVENOUS at 16:05

## 2017-08-21 RX ADMIN — PHENYLEPHRINE HYDROCHLORIDE 100 MCG: 10 INJECTION INTRAVENOUS at 13:50

## 2017-08-21 RX ADMIN — FENTANYL CITRATE 50 MCG: 50 INJECTION INTRAMUSCULAR; INTRAVENOUS at 11:02

## 2017-08-21 RX ADMIN — HYDROMORPHONE HYDROCHLORIDE 0.5 MG: 2 INJECTION, SOLUTION INTRAMUSCULAR; INTRAVENOUS; SUBCUTANEOUS at 17:57

## 2017-08-21 NOTE — ANESTHESIA POSTPROCEDURE EVALUATION
"Patient: Regan Cavazos    Procedure Summary     Date Anesthesia Start Anesthesia Stop Room / Location    08/21/17 1326 1816  [facility] / [facility]       Procedure Diagnosis Surgeon Provider    L2-L4 Repeat Laminectomy, L2-L4 TLIF with cage L2-L5  Fusion with instrumentation and L3-L5 removal of instrumentation. (N/A Spine Lumbar) Spinal stenosis, lumbar region  (Spinal stenosis, lumbar region [M48.06]) MD Sarmad Pro MD          Anesthesia Type: general  Last vitals  BP   115/58 (08/21/17 1935)    Temp        Pulse   59 (08/21/17 1935)   Resp   16 (08/21/17 1935)    SpO2   95 % (08/21/17 1935)      Post Anesthesia Care and Evaluation    Patient location during evaluation: PACU  Patient participation: complete - patient participated  Level of consciousness: awake  Pain management: adequate  Airway patency: patent  Anesthetic complications: No anesthetic complications    Cardiovascular status: acceptable  Respiratory status: acceptable  Hydration status: acceptable    Comments: /58 (BP Location: Left arm, Patient Position: Lying)  Pulse 59  Temp 36.7 °C (98 °F) (Oral)   Resp 16  Ht 73\" (185.4 cm)  Wt 202 lb 11.2 oz (91.9 kg)  SpO2 95%  BMI 26.74 kg/m2      "

## 2017-08-21 NOTE — OP NOTE
Operative note    Pre-op diagnosis: L3-4 L4-5 pseudoarthrosis status post L3 to 5 laminectomy and fusion with instrumentation, L 2-3, 3-4 recurrent spinal stenosis    Postoperative diagnosis: Same, grossly unstable pseudoarthrosis at L34, minimal but perceptible motion at L4 5    Procedure: L2-3,3-4 repeat laminectomy for decompression, L2 to L5 bilateral posterior lateral fusion with AcroMed expedient segmental instrumentation, left L 2-3 and L3-4 transforaminal lumbar interbody fusion with Concorde bullet carbon fiber cage, removal of segmental instrumentation L3 to L5 with local bone graft, right iliac marrow aspiration, andVivogen bone graft substitute.    Surgeon: Marcello Lopez Jr, M.D.    Asst.: Carol Rodas    EBL: 750 cc    Anesthetic: Gen.    Condition: Satisfactory    Specimen: Culture at to microbiology for Gram stain and CNS (no obvious gross evidence of infection)    Description of procedure: Patient was anesthetized and positioned prone.  Bony prominences were well padded.  The back was prepped and draped in sterile fashion.  The prior scar was elliptically excised about 10 cm in length.  The muscle was stripped subperiosteally to the tips of transverse processes L2 and the hardware at L3 to L5.  Curettes and rongeurs removed adherent soft tissue.  The hardware was removed without event.  The L3 screws were mildly loose.  The L4 screws were tight.  The L5 screws were grossly loose.  Interestingly after curetting the graft bed appeared to be an abundance of bone which was not in continuity and there was a grossly mobile nonunion at L3-4, but there was only barely perceptible motion at L4 5.  Packs were placed for hemostasis.  The graft was decorticated.  A Steffee probe was inserted at the intersection of the anatomic landmarks at L2-3..  A flexible probe was inserted to check the integrity of the pedicle.  Replaced at L2 and replaced in the holes below from L3 to L5 using larger screws even up to 8  mm.  Contoured rods were later added, nuts were tightened and torqued.  Biplanar image intensification showed appropriate screw position and anatomic level and satisfactory posture.  I performed a laminectomy for decompression.   The interspinous ligament was excised at L2-3.  The upper lamina was removed  out to within 8-10 mm of the pars intra-articularis.  A small portion of the cephalad aspect of the caudal lamina was excised with a Kerrison rongeur.  Medial facetectomies were performed bilaterally using Kerrison rongeur and osteotomes.  A high-speed geetha was used as well.  Foraminotomies were accomplished with a foraminotomy rongeur.   The disc was determined to be not impinging and stable and was not excised.  This process was repeated at the adjacent levels in identical fashion,, except that extensive scarring required careful elevation of the dura scar from the lateral elements..  Upon completion of the decompression I could pass a ball probe through the affected foramina, and easily retract  the nerve roots  toward the midline.  Bleeding was controlled with bipolar cautery,and Gelfoam with thrombin and/ or FloSeal hemostatic matrix.  No dural trauma was sustained, and no CSF leakage was noted.  I was unable to carry out the decompression down into L4 5 which was simply too densely scarred to the laminar and the lateral elements sidewalls.  Additionally there was a least evidence of neurologic compression at this level and the least need for interbody fusion.  An annulotomy was accomplished with a knife and pituitary rongeur after facetectomy on the left at L2-3.  Sequential  scrapers, along with curettage were used to remove disc debris down to bleeding subchondral and plate bone.  The passing root was protected medially with a bayonet nerve root retractor.  The exiting root was protected above.  A self-retaining distractor was placed and engaged.  Disc debris was adequately removed.  Now the  appropriate-sized cage was selected, matching the final scraper size.  The sponge, which had been prepared at the back table, was packed into the Concorde bullet carbon fiber cage.  Additional sponge was placed in the anterior aspect of the disc space.  The cage was then tamped into position and finally seated.  The distraction was removed and the cage fit was excellent.  This was repeated at L3-4.  No neurologic structures were impinged or significantly stretched.  Biplanar images showed satisfactory position of the cage and of course appropriate anatomic level.  Bone marrow was obtained from the right iliac crest.  The marrow was applied to the bone graft and the Vivogen bone graft extender.  Laminectomy bone, and bone from decortication of the graft bed was cleaned at the back table throughout the case and available for bone graft.  The morcellized bone was placed in the decorticated lateral gutter bilaterally. .  Hemostasis was assured.  The wound was then closed with running and interrupted #1 Vicryl for the dorsal fascia, 2-0 Vicryl subcutaneously, then 4-0 Monocryl and Dermabond for the skin.  A sterile dressing was applied.  The patient is about to be recovered.

## 2017-08-21 NOTE — H&P
"   []Hide copied text  New patient or new problem visit         Chief Complaint   Patient presents with   • Lumbar Spine - Pain         HPI: Patient is a 72-year-old who complains of severe constant stabbing aching low back pain.  3 or 4 years ago he underwent a laminectomy and then one month later of fusion with instrumentation he states that the active pain started hurting after his first right hip replacement shortly before that.  He complains that the hip was 1 inch short and had to be revised.  In terms did not improve after the revision however.  Dr. Rivas did the  spinal surgery and apparently is no longer available.  He was told there was a \"screw loose\" in his back.  He is been on steroids for pain relief and [facility].  He complains that the pain is excruciating.  It's unclear if he had a \"honeymoon.  \"Initially following the's fusion surgery.     PFSH: See chart- reviewed.  He had a heart attack last December and underwent bypass and was told that he must remain on Plavix 1 year before any elective surgeries.  He needs to be seen by cardiology before any planned surgery to undergo a recommended echocardiogram.  [facility] contacts are listed in should call from today and yesterday.     Review of Systems   Constitutional: Negative for chills, fever and unexpected weight change.   HENT: Negative for trouble swallowing and voice change.    Eyes: Negative for visual disturbance.   Respiratory: Negative for cough and shortness of breath.    Cardiovascular: Negative for chest pain and leg swelling.   Gastrointestinal: Negative for abdominal pain, nausea and vomiting.   Endocrine: Negative for cold intolerance and heat intolerance.   Genitourinary: Negative for difficulty urinating, frequency and urgency.   Skin: Negative for rash and wound.   Allergic/Immunologic: Negative for immunocompromised state.   Neurological: Negative for weakness and numbness. "   Hematological: Does not bruise/bleed easily.   Psychiatric/Behavioral: Negative for dysphoric mood. The patient is not nervous/anxious.          PE: Constitutional: Vital signs above-noted.  Awake, alert and oriented     Psychiatric: Affect and insight do not appear grossly disturbed.  He seems insistestent on being understood that his pain is significant.     Pulmonary: Breathing is unlabored, color is good.     Skin: Warm, dry and normal turgor     Cardiac:  pedal pulses intact.  No edema.     Eyesight and hearing appear adequate for examination purposes     Musculoskeletal:  There is some tenderness to percussion and palpation of the spine. Motion appears stiff.  Posture is unremarkable to coronal and sagittal inspection.    The skin about the area is well healed.  There is no palpable or visible deformity.  There is no local spasm.       Neurologic:   Reflexes are 2+ and symmetrical in the patellae and absent in the Achilles.   Motor function is undisturbed in quadriceps, EHL, and gastrocnemius      Sensation appears symmetrically intact to light touch   .  In the bilateral lower extremities there is no evidence of atrophy.   Clonus is absent..  Gait appears undisturbed.  SLR test negative        MEDICAL DECISION MAKING     XRAY: plain film x-rays of the lumbar spine demonstrate an L3 to 5 fusion with pedicle screw instrumentation around which slight lucency's may be seen.  Slight scoliosis is noted.  I don't see a lot of demonstrable posterior lateral fusion bone.  Hardware appears to be well placed.  Extensive degenerative changes noted at the L2-3 disc space above.  Compared to 2016 images I don't see a substantial change.  CT scan of the lumbar spine suggest a solid facet fusion at L4-5 but I don't see a facet fusion at 3 for.  Again small lucencies around the hardware and noted but these in and of themselves are not dramatic nor necessarily indicative of a nonunion.  Significant stenosis noted.  He  brought with him some hip films which show total hip replacement on the right which appears to be unremarkable in appearance reviewed the radiologist reports which I [facility] and a different little from my own.       Other: MRI demonstrates L2-3 severe stenosis with good decompression at L3 4 and L4-5.  I can't comment on the state of the fusion based on the MRI.     Impression: Possible nonunion 34 looks more likely than 45 HEENT never seemed to have a dramatic early relief so it may simply be he's having pain from above below are both        Plan:  I plan L2 3 repeat laminectomy and fusion with instrumentation I will remove the hardware from 3-5 and fix the nonunion is present.  Risk and benefits were discussed in great detail.I discussed the risks and benefits of posterior spinal fusion, including where applicable, laminectomy.  Risks include adverse anesthetic events such as death, stroke and myocardial infarction.  More specific surgical risks include infection, nonunion, hardware failure, spinal fluid leakage, nerve root injury or paralysis, visceral or vascular injury, persistent pain, deep venous thrombosis, and pulmonary embolism among others. There is a 70 to 90 % chance of success.   Alternatives have been discussed.  After careful consideration the patient wishes to proceed with surgery.

## 2017-08-21 NOTE — ANESTHESIA PREPROCEDURE EVALUATION
Anesthesia Evaluation     Patient summary reviewed and Nursing notes reviewed   NPO Solid Status: > 8 hours       Airway   Mallampati: II  TM distance: >3 FB  Neck ROM: full  no difficulty expected  Dental - normal exam     Pulmonary - negative pulmonary ROS and normal exam   Cardiovascular - normal exam    (+) CAD,     ROS comment: No cp    Neuro/Psych  (+) seizures,    GI/Hepatic/Renal/Endo - negative ROS     Musculoskeletal (-) negative ROS    Abdominal  - normal exam   Substance History - negative use     OB/GYN negative ob/gyn ROS         Other                                        Anesthesia Plan    ASA 3     general     intravenous induction   Anesthetic plan and risks discussed with patient.    Plan discussed with CRNA.

## 2017-08-21 NOTE — ANESTHESIA PROCEDURE NOTES
Airway  Urgency: elective    Date/Time: 8/21/2017 1:37 PM  Airway not difficult    General Information and Staff    Patient location during procedure: OR  Anesthesiologist: FÁTIMA SAL    Indications and Patient Condition  Indications for airway management: airway protection    Preoxygenated: yes      Final Airway Details  Final airway type: endotracheal airway      Successful airway: ETT and reinforced tube    Successful intubation technique: direct laryngoscopy  Endotracheal tube insertion site: oral  Blade: Atul  Blade size: #3  ETT size: 8.0 mm  Cormack-Lehane Classification: grade I - full view of glottis  Placement verified by: chest auscultation and capnometry   Measured from: lips  ETT to lips (cm): 21  Number of attempts at approach: 1    Additional Comments  Atraumatic tonny # 3 mac 8.0 reinforced x1  ebe ebbs tube sec vent cont eyes taped

## 2017-08-21 NOTE — PLAN OF CARE
Problem: Patient Care Overview (Adult)  Goal: Plan of Care Review  Outcome: Ongoing (interventions implemented as appropriate)    08/21/17 1043   Coping/Psychosocial Response Interventions   Plan Of Care Reviewed With patient   Patient Care Overview   Progress no change       Goal: Adult Individualization and Mutuality  Outcome: Ongoing (interventions implemented as appropriate)    08/21/17 1043   Individualization   Patient Specific Preferences call Ye   Patient Specific Goals no pain after I have had surgery and healed   Patient Specific Interventions teach S&S of infection   Mutuality/Individual Preferences   What Anxieties, Fears or Concerns Do You Have About Your Health or Care? none   What Questions Do You Have About Your Health or Care? none   What Information Would Help Us Give You More Personalized Care? none       Goal: Discharge Needs Assessment  Outcome: Ongoing (interventions implemented as appropriate)    08/21/17 1043   Discharge Needs Assessment   Concerns To Be Addressed denies needs/concerns at this time   Readmission Within The Last 30 Days no previous admission in last 30 days   Current Health   Anticipated Changes Related to Illness none   Self-Care   Equipment Currently Used at Home none         Problem: Perioperative Period (Adult)  Goal: Signs and Symptoms of Listed Potential Problems Will be Absent or Manageable (Perioperative Period)  Outcome: Ongoing (interventions implemented as appropriate)    08/21/17 1043   Perioperative Period   Problems Assessed (Perioperative Period) pain;hypoxia/hypoxemia;situational response   Problems Present (Perioperative Period) pain

## 2017-08-22 LAB
ANION GAP SERPL CALCULATED.3IONS-SCNC: 10.2 MMOL/L
BUN BLD-MCNC: 13 MG/DL (ref 8–23)
BUN/CREAT SERPL: 13 (ref 7–25)
CALCIUM SPEC-SCNC: 7.7 MG/DL (ref 8.6–10.5)
CHLORIDE SERPL-SCNC: 102 MMOL/L (ref 98–107)
CO2 SERPL-SCNC: 24.8 MMOL/L (ref 22–29)
CREAT BLD-MCNC: 1 MG/DL (ref 0.76–1.27)
GFR SERPL CREATININE-BSD FRML MDRD: 74 ML/MIN/1.73
GLUCOSE BLD-MCNC: 113 MG/DL (ref 65–99)
HCT VFR BLD AUTO: 29.6 % (ref 40.4–52.2)
HGB BLD-MCNC: 9.3 G/DL (ref 13.7–17.6)
POTASSIUM BLD-SCNC: 4.8 MMOL/L (ref 3.5–5.2)
SODIUM BLD-SCNC: 137 MMOL/L (ref 136–145)

## 2017-08-22 PROCEDURE — 99024 POSTOP FOLLOW-UP VISIT: CPT | Performed by: ORTHOPAEDIC SURGERY

## 2017-08-22 PROCEDURE — 80048 BASIC METABOLIC PNL TOTAL CA: CPT | Performed by: ORTHOPAEDIC SURGERY

## 2017-08-22 PROCEDURE — 97162 PT EVAL MOD COMPLEX 30 MIN: CPT

## 2017-08-22 PROCEDURE — 25010000003 CEFAZOLIN IN DEXTROSE 2-4 GM/100ML-% SOLUTION: Performed by: ORTHOPAEDIC SURGERY

## 2017-08-22 PROCEDURE — 85014 HEMATOCRIT: CPT | Performed by: ORTHOPAEDIC SURGERY

## 2017-08-22 PROCEDURE — 85018 HEMOGLOBIN: CPT | Performed by: ORTHOPAEDIC SURGERY

## 2017-08-22 PROCEDURE — 94799 UNLISTED PULMONARY SVC/PX: CPT

## 2017-08-22 PROCEDURE — 97110 THERAPEUTIC EXERCISES: CPT

## 2017-08-22 RX ORDER — ATORVASTATIN CALCIUM 20 MG/1
20 TABLET, FILM COATED ORAL NIGHTLY
Status: DISCONTINUED | OUTPATIENT
Start: 2017-08-22 | End: 2017-08-25 | Stop reason: HOSPADM

## 2017-08-22 RX ORDER — ALUMINA, MAGNESIA, AND SIMETHICONE 2400; 2400; 240 MG/30ML; MG/30ML; MG/30ML
30 SUSPENSION ORAL EVERY 6 HOURS PRN
Status: DISCONTINUED | OUTPATIENT
Start: 2017-08-22 | End: 2017-08-25 | Stop reason: HOSPADM

## 2017-08-22 RX ORDER — SODIUM CHLORIDE 9 MG/ML
100 INJECTION, SOLUTION INTRAVENOUS CONTINUOUS
Status: DISCONTINUED | OUTPATIENT
Start: 2017-08-22 | End: 2017-08-25 | Stop reason: HOSPADM

## 2017-08-22 RX ADMIN — OXYCODONE HYDROCHLORIDE AND ACETAMINOPHEN 2 TABLET: 5; 325 TABLET ORAL at 04:21

## 2017-08-22 RX ADMIN — OXYCODONE HYDROCHLORIDE AND ACETAMINOPHEN 2 TABLET: 5; 325 TABLET ORAL at 20:50

## 2017-08-22 RX ADMIN — OXYCODONE HYDROCHLORIDE AND ACETAMINOPHEN 2 TABLET: 5; 325 TABLET ORAL at 09:17

## 2017-08-22 RX ADMIN — TERAZOSIN HYDROCHLORIDE 5 MG: 5 CAPSULE ORAL at 16:52

## 2017-08-22 RX ADMIN — SODIUM CHLORIDE 100 ML/HR: 9 INJECTION, SOLUTION INTRAVENOUS at 10:45

## 2017-08-22 RX ADMIN — CEFAZOLIN SODIUM 2 G: 2 INJECTION, SOLUTION INTRAVENOUS at 05:28

## 2017-08-22 RX ADMIN — ALUMINUM HYDROXIDE, MAGNESIUM HYDROXIDE, AND DIMETHICONE 30 ML: 400; 400; 40 SUSPENSION ORAL at 15:01

## 2017-08-22 RX ADMIN — OXYCODONE HYDROCHLORIDE AND ACETAMINOPHEN 2 TABLET: 5; 325 TABLET ORAL at 14:16

## 2017-08-22 RX ADMIN — ATORVASTATIN CALCIUM 20 MG: 20 TABLET, FILM COATED ORAL at 20:50

## 2017-08-22 RX ADMIN — CYCLOBENZAPRINE HYDROCHLORIDE 10 MG: 10 TABLET, FILM COATED ORAL at 22:39

## 2017-08-22 RX ADMIN — CARBAMAZEPINE 200 MG: 200 TABLET ORAL at 09:18

## 2017-08-22 RX ADMIN — PANTOPRAZOLE SODIUM 40 MG: 40 TABLET, DELAYED RELEASE ORAL at 05:28

## 2017-08-22 RX ADMIN — SODIUM CHLORIDE 100 ML/HR: 9 INJECTION, SOLUTION INTRAVENOUS at 21:38

## 2017-08-22 RX ADMIN — ALUMINUM HYDROXIDE, MAGNESIUM HYDROXIDE, AND DIMETHICONE 30 ML: 400; 400; 40 SUSPENSION ORAL at 20:50

## 2017-08-22 RX ADMIN — DOCUSATE SODIUM -SENNOSIDES 1 TABLET: 50; 8.6 TABLET, COATED ORAL at 09:17

## 2017-08-22 NOTE — PROGRESS NOTES
Discharge Planning Assessment  [facility]     Patient Name: Regan Cavazos  MRN: [id number]  Today's Date: 8/22/2017    Admit Date: 8/21/2017          Discharge Needs Assessment       08/22/17 1300    Living Environment    Lives With spouse   fiance    Living Arrangements house    Home Accessibility no concerns    Transportation Available car;family or friend will provide    Living Environment    Provides Primary Care For no one    Discharge Needs Assessment    Concerns To Be Addressed no discharge needs identified;denies needs/concerns at this time    Equipment Currently Used at Home cane, straight    Equipment Needed After Discharge walker, rolling;commode    Current Discharge Risk physical impairment    Discharge Disposition home or self-care            Discharge Plan       08/22/17 1300    Case Management/Social Work Plan    Plan Home w/ family to assist as needed; denies any needs at this time.      Patient/Family In Agreement With Plan yes    Additional Comments Met w/ patient in room.  Introduced self and explained ccp role.  Facesheet verified/corrected.  [facility]  He is engaged to harley Araiza.  Plan is home w/ family assistance.  Discussed poss need for DME: walker/commode.  Awaiting PT assessment (has only been up to chair today).          Discharge Placement     No information found                Demographic Summary       08/22/17 1301    Primary Care Physician Information    Name Dr Milagros Goodman [address]     Phone [phone]    Fax [phone]      08/22/17 1300    Referral Information    Admission Type inpatient    Arrived From admitted as an inpatient;still a patient    Reason For Consult care coordination/care conference;discharge planning    Contact Information    Permission Granted to Share Information With family/designee            Functional Status       08/22/17 1300    Functional Status Current    Ambulation 3-->assistive equipment and  person    Transferring 3-->assistive equipment and person    Toileting 3-->assistive equipment and person    Bathing 3-->assistive equipment and person    Dressing 3-->assistive equipment and person    Eating 0-->independent    Communication 0-->understands/communicates without difficulty    Swallowing (if score 2 or more for any item, consult Rehab Services) 0-->swallows foods/liquids without difficulty    Functional Status Prior    Ambulation 0-->independent    Transferring 0-->independent    Toileting 0-->independent    Bathing 0-->independent    Dressing 0-->independent    Eating 0-->independent    Communication 0-->understands/communicates without difficulty    Swallowing 0-->swallows foods/liquids without difficulty    Activity Tolerance    Current Activity Limitations none;spinal precautions    Usual Activity Tolerance excellent    Current Activity Tolerance good    Cognitive/Perceptual/Developmental    Current Mental Status/Cognitive Functioning no deficits noted                   Mana Drummond, RN

## 2017-08-22 NOTE — PROGRESS NOTES
Postop day 1: AVSS awake alert oriented.  No leg pain expected back pain.  Moves legs well.  Hemoglobin 9.3.  Doing well home in a day or 2.
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
## 2017-08-22 NOTE — PROGRESS NOTES
Name: Regan Cavazos ADMIT: 2017   : 1945  PCP: Milagros Goodman MD    MRN: [id number] LOS: 1 days   AGE/SEX: 72 y.o. male  ROOM: [facility]   Subjective   Subjective  Cc- back pain  Pain moderate-severe  Not currently releived with pain medication  Symptoms of needing to pee though he has bailey in still    ROS  No f/c  No n/v  Objective   Vital Signs  Temp:  [97.4 °F (36.3 °C)-98.1 °F (36.7 °C)] 97.9 °F (36.6 °C)  Heart Rate:  [57-94] 59  Resp:  [16-20] 16  BP: (100-130)/(53-80) 100/53  SpO2:  [94 %-100 %] 99 %  on  Flow (L/min):  [2-4] 2;   O2 Device: nasal cannula  Body mass index is 26.74 kg/(m^2).    Physical Exam    Alert, in mild pain  RRR  Lungs clear decreased bs at bases  Soft, nt  No edema  +bailey    Results Review:       I reviewed the patient's new clinical results.    Results from last 7 days  Lab Units 17  0536 17  2257   HEMOGLOBIN g/dL 9.3* 10.9*     Results from last 7 days  Lab Units 17  0536   SODIUM mmol/L 137   POTASSIUM mmol/L 4.8   CHLORIDE mmol/L 102   CO2 mmol/L 24.8   BUN mg/dL 13   CREATININE mg/dL 1.00   GLUCOSE mg/dL 113*   Estimated Creatinine Clearance: 89.3 mL/min (by C-G formula based on Cr of 1).  Results from last 7 days  Lab Units 17  0536   CALCIUM mg/dL 7.7*         atorvastatin 20 mg Oral Daily   carBAMazepine 200 mg Oral Daily With Breakfast   pantoprazole 40 mg Oral Q AM   sennosides-docusate sodium 1 tablet Oral BID   terazosin 5 mg Oral Nightly       HYDROmorphone HCl-NaCl     lactated ringers 100 mL/hr    lactated ringers 9 mL/hr Last Rate: 9 mL/hr (17 1101)   sodium chloride 0.45 % with KCl 20 mEq 100 mL/hr Last Rate: 100 mL/hr (17 2329)   Diet Clear Liquid; Cardiac      Assessment/Plan   Assessment:     Active Hospital Problems (** Indicates Principal Problem)    Diagnosis Date Noted   • **Spinal stenosis, lumbar region [M48.06] 2017   • Spinal stenosis of lumbar region [M48.06] 2017      Resolved Hospital  Problems    Diagnosis Date Noted Date Resolved   No resolved problems to display.       Plan:   Spinal stensosis- post op pain control, ambulation with PT. Sounds like bailey probably being removed today. Stool softeners    BPH- restarting terazosin tonight, monitor BP    HLD- on lipitor    Epilepsy- Tegratol.    D/W RN  Reviewed previous records    Disposition  TBD.      Justen Araiza MD  [facility]  08/22/17  9:59 AM

## 2017-08-22 NOTE — PLAN OF CARE
Problem: Patient Care Overview (Adult)  Goal: Plan of Care Review    08/22/17 1107   Outcome Evaluation   Outcome Summary/Follow up Plan pt presents w, generalized weakness and pain post op lami/fusion, fair tolerance to OOB to chair, family to bring brace in. may benefit from skilled PT acutely to address functional defiicts and assist wtih DC plans.          Problem: Inpatient Physical Therapy  Goal: Bed Mobility Goal LTG- PT    08/22/17 1107   Bed Mobility PT LTG   Bed Mobility PT LTG, Date Established 08/22/17   Bed Mobility PT LTG, Time to Achieve 1 wk   Bed Mobility PT LTG, Activity Type all bed mobility   Bed Mobility PT LTG, [place] Level supervision required       Goal: Transfer Training Goal 1 LTG- PT    08/22/17 1107   Transfer Training PT LTG   Transfer Training PT LTG, Date Established 08/22/17   Transfer Training PT LTG, Time to Achieve 1 wk   Transfer Training PT LTG, Activity Type all transfers   Transfer Training PT LTG, [place] Level supervision required       Goal: Gait Training Goal LTG- PT    08/22/17 1107   Gait Training PT LTG   Gait Training Goal PT LTG, Date Established 08/22/17   Gait Training Goal PT LTG, Time to Achieve 1 wk   Gait Training Goal PT LTG, [place] Level supervision required   Gait Training Goal PT LTG, Assist Device walker, rolling   Gait Training Goal PT LTG, Distance to Achieve 250

## 2017-08-22 NOTE — PROGRESS NOTES
Continued Stay Note  [facility]     Patient Name: Regan Cavazos  MRN: [id number]  Today's Date: 8/22/2017    Admit Date: 8/21/2017          Discharge DME Plan:      08/22/17 1333    Case Management/Social Work Plan    Additional Comments Called Bill at [facility] and [facility]- either PCP can fax them or CCP can fax directly to them prior to patients arrival there to  DME (# [phone] or [phone]// fax# [phone]).        08/22/17 0101    Case Management/Social Work Plan    Additional Comments [facility] Wan in [facility] ([phone]).  Advises that even though this was [facility] through the [facility] and [facility] are a [facility] care [facility], patient can take scripts for DME and pain medication directly to [facility] (orders can be faxed to Dr Goodman's ofc @ fax# [phone] and they can forward to [facility]).                          Mana Drummond RN

## 2017-08-22 NOTE — NURSING NOTE
Patient requested to take his prostate medication early since he is unable to void s/p bailey catheter removal and doesn't want to be in and out cathed.  Patient has a history of prostate cancer and says this is not unusual.  Spoke with Dr Araiza re: patient request not to be in and out catheterized, Dr Araiza states to go ahead and give the medication early and it is ok to give the patient more time to void he doesn't have to be straight catheterized.
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
## 2017-08-22 NOTE — PLAN OF CARE
Problem: Patient Care Overview (Adult)  Goal: Plan of Care Review  Outcome: Ongoing (interventions implemented as appropriate)    08/21/17 2015 08/21/17 2029 08/22/17 0513   Coping/Psychosocial Response Interventions   Plan Of Care Reviewed With --  patient;significant other;family --    Patient Care Overview   Progress progress toward functional goals as expected --  --    Outcome Evaluation   Outcome Summary/Follow up Plan --  --  Pt. remains A&O x4 throughout shift. Pt. complains of pain, and requires reinstruction regarding PCA. Pt. tolerating medication regimen. Pt. was able to tolerate turning in the bed but was not able to tolerating laying on one side. VSS, will continue to monitor.       Goal: Adult Individualization and Mutuality  Outcome: Ongoing (interventions implemented as appropriate)  Goal: Discharge Needs Assessment  Outcome: Ongoing (interventions implemented as appropriate)    Problem: Perioperative Period (Adult)  Goal: Signs and Symptoms of Listed Potential Problems Will be Absent or Manageable (Perioperative Period)  Outcome: Ongoing (interventions implemented as appropriate)    Problem: Fall Risk (Adult)  Goal: Identify Related Risk Factors and Signs and Symptoms  Outcome: Ongoing (interventions implemented as appropriate)  Goal: Absence of Falls  Outcome: Ongoing (interventions implemented as appropriate)    Problem: Pain, Chronic (Adult)  Goal: Identify Related Risk Factors and Signs and Symptoms  Outcome: Ongoing (interventions implemented as appropriate)  Goal: Acceptable Pain Control/Comfort Level  Outcome: Ongoing (interventions implemented as appropriate)
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
## 2017-08-22 NOTE — THERAPY TREATMENT NOTE
Acute Care - Physical Therapy Treatment Note  [facility]     Patient Name: Regan Cavazos  : 1945  MRN: [id number]  Today's Date: 2017  Onset of Illness/Injury or Date of Surgery Date: 17  Date of Referral to PT: 17  Referring Physician: John    Admit Date: 2017    Visit Dx:    ICD-10-CM ICD-9-CM   1. Generalized weakness R53.1 780.79   2. Spinal stenosis, lumbar region M48.06 724.02     Patient Active Problem List   Diagnosis   • Spinal stenosis, lumbar region   • Spinal stenosis of lumbar region               Adult Rehabilitation Note       17 1600          Rehab Assessment/Intervention    Discipline physical therapy assistant  -RW      Document Type therapy note (daily note)  -RW      Subjective Information agree to therapy;complains of;pain  -RW      Patient Effort, Rehab Treatment good  -RW      Precautions/Limitations brace on when up;fall precautions;spinal precautions  -RW      Recorded by [RW] Ashley Teague PTA      Pain Assessment    Pain Assessment 0-10  -RW      Pain Score 5  -RW      Pain Type Acute pain;Surgical pain  -RW      Pain Location Back  -RW      Pain Orientation Lower  -RW      Pain Intervention(s) Medication (See MAR);Repositioned;Ambulation/increased activity  -RW      Response to Interventions tolerated  -RW      Recorded by [RW] Ashley Teague PTA      Cognitive Assessment/Intervention    Current Cognitive/Communication Assessment functional  -RW      Orientation Status oriented x 4  -RW      Follows Commands/Answers Questions 100% of the time  -RW      Personal Safety WNL/WFL  -RW      Personal Safety Interventions fall prevention program maintained;gait belt;nonskid shoes/slippers when out of bed  -RW      Recorded by [RW] Ashley Teague PTA      Bed Mobility, Assessment/Treatment    Bed Mob, Supine to Sit, [place] not tested  -RW      Bed Mob, Sit to Supine, [place] minimum assist (75% patient effort);verbal cues required    assist w/ BLE  -RW      Bed Mobility, Comment via log roll  -RW      Recorded by [RW] Ashley Teague PTA      Transfer Assessment/Treatment    Transfers, Sit-Stand [place] contact guard assist  -RW      Transfers, Stand-Sit [place] contact guard assist  -RW      Transfers, Sit-Stand-Sit, Assist Device rolling walker  -RW      Recorded by [RW] Ashley Teague PTA      Gait Assessment/Treatment    Gait, [place] Level contact guard assist;1 person + 1 person to manage equipment;verbal cues required  -RW      Gait, Assistive Device rolling walker  -RW      Gait, Distance (Feet) 150  -RW      Gait, Gait Pattern Analysis swing-through gait  -RW      Gait, Gait Deviations forward flexed posture;left:;antalgic;bilateral:;jimbo decreased  -RW      Recorded by [RW] Ashley Teague PTA      Balance Skills Training    Standing-Level of Assistance Contact guard  -RW      Static Standing Balance Support assistive device  -RW      Standing Balance # of Minutes 1   stood in restroom   -RW      Recorded by [RW] Ashley Teague PTA      Positioning and Restraints    Pre-Treatment Position sitting in chair/recliner  -RW      Post Treatment Position bed  -RW      In Bed fowlers;call light within reach;encouraged to call for assist;with family/caregiver  -RW      Recorded by [RW] Ashley Teague PTA        User Key  (r) = Recorded By, (t) = Taken By, (c) = Cosigned By    Initials Name Effective Dates    CEDRIC Teague PTA 04/06/16 -                 IP PT Goals       08/22/17 1107          Bed Mobility PT LTG    Bed Mobility PT LTG, Date Established 08/22/17  -      Bed Mobility PT LTG, Time to Achieve 1 wk  -LH      Bed Mobility PT LTG, Activity Type all bed mobility  -      Bed Mobility PT LTG, [place] Level supervision required  -      Transfer Training PT LTG    Transfer Training PT LTG, Date Established 08/22/17  -      Transfer Training PT LTG, Time to Achieve 1 wk  -LH      Transfer  Training PT LTG, Activity Type all transfers  -      Transfer Training PT LTG, [place] Level supervision required  -      Gait Training PT LTG    Gait Training Goal PT LTG, Date Established 08/22/17  -      Gait Training Goal PT LTG, Time to Achieve 1 wk  -      Gait Training Goal PT LTG, [place] Level supervision required  -      Gait Training Goal PT LTG, Assist Device walker, rolling  -      Gait Training Goal PT LTG, Distance to Achieve 250  -LH        User Key  (r) = Recorded By, (t) = Taken By, (c) = Cosigned By    Initials Name Provider Type     Jenny Daniel PT Physical Therapist          Physical Therapy Education     Title: PT OT SLP Therapies (Active)     Topic: Physical Therapy (Active)     Point: Mobility training (Active)    Learning Progress Summary    Learner Readiness Method Response Comment Documented by Status   Patient Acceptance E [facility] 08/22/17 1107 Active               Point: Home exercise program (Active)    Learning Progress Summary    Learner Readiness Method Response Comment Documented by Status   Patient Acceptance E [facility] 08/22/17 1107 Active               Point: Body mechanics (Active)    Learning Progress Summary    Learner Readiness Method Response Comment Documented by Status   Patient Acceptance E [facility] 08/22/17 1107 Active               Point: Precautions (Active)    Learning Progress Summary    Learner Readiness Method Response Comment Documented by Status   Patient Acceptance E [facility] 08/22/17 1107 Active                      User Key     Initials Effective Dates Name Provider Type [facility] 02/07/17 -  Jenny Daniel PT Physical Therapist PT                    PT Recommendation and Plan  Anticipated Discharge Disposition: home with assist  Planned Therapy Interventions: balance training, bed mobility training, gait training, home exercise program, ROM (Range of Motion), stair training, strengthening, stretching, transfer training  PT Frequency: 2  times/day             Outcome Measures       08/22/17 1100          How much help from another person do you currently need...    Turning from your back to your side while in flat bed without using bedrails? 3  -LH      Moving from lying on back to sitting on the side of a flat bed without bedrails? 3  -LH      Moving to and from a bed to a chair (including a wheelchair)? 3  -LH      Standing up from a chair using your arms (e.g., wheelchair, bedside chair)? 3  -LH      Climbing 3-5 steps with a railing? 2  -LH      To walk in hospital room? 3  -LH      AM-PAC 6 Clicks Score 17  -      Functional Assessment    Outcome Measure Options AM-PAC 6 Clicks Basic Mobility (PT)  -        User Key  (r) = Recorded By, (t) = Taken By, (c) = Cosigned By    Initials Name Provider Type     Jenny Daniel PT Physical Therapist           Time Calculation:         PT Charges       08/22/17 1605 08/22/17 1109       Time Calculation    Start Time 1603  - 1052  -     Stop Time 1618  - 1109  -     Time Calculation (min) 15 min  -RW 17 min  -     PT Received On 08/22/17  - 08/22/17  -     PT - Next Appointment 08/23/17  - 08/22/17  -     PT Goal Re-Cert Due Date  08/29/17  -       User Key  (r) = Recorded By, (t) = Taken By, (c) = Cosigned By    Initials Name Provider Type     Jenny Daniel, PT Physical Therapist     Ashley Teague PTA Physical Therapy Assistant          Therapy Charges for Today     Code Description Service Date Service Provider Modifiers Qty    79375607634 HC PT THER PROC EA 15 MIN 8/22/2017 Ashley Teague PTA GP 1    81159537171 HC PT THER SUPP EA 15 MIN 8/22/2017 Ashley Teague PTA GP 1          PT G-Codes  Outcome Measure Options: AM-PAC 6 Clicks Basic Mobility (PT)    Ashley Teague PTA  8/22/2017

## 2017-08-22 NOTE — PLAN OF CARE
Problem: Patient Care Overview (Adult)  Goal: Plan of Care Review  Outcome: Ongoing (interventions implemented as appropriate)    08/21/17 2015   Coping/Psychosocial Response Interventions   Plan Of Care Reviewed With patient   Patient Care Overview   Progress progress toward functional goals as expected   Outcome Evaluation   Outcome Summary/Follow up Plan patient vital signs stable. pain level tolerable. PCA initiated. Family updated and at bedside in PACU. Family sent to room 595. Report called to receiving RN. Patient transferred to 595         Problem: Perioperative Period (Adult)  Goal: Signs and Symptoms of Listed Potential Problems Will be Absent or Manageable (Perioperative Period)  Outcome: Ongoing (interventions implemented as appropriate)    08/21/17 2015   Perioperative Period   Problems Assessed (Perioperative Period) all   Problems Present (Perioperative Period) pain

## 2017-08-23 LAB
HCT VFR BLD AUTO: 27.7 % (ref 40.4–52.2)
HGB BLD-MCNC: 8.5 G/DL (ref 13.7–17.6)

## 2017-08-23 PROCEDURE — 85014 HEMATOCRIT: CPT | Performed by: ORTHOPAEDIC SURGERY

## 2017-08-23 PROCEDURE — 85018 HEMOGLOBIN: CPT | Performed by: ORTHOPAEDIC SURGERY

## 2017-08-23 PROCEDURE — 97110 THERAPEUTIC EXERCISES: CPT

## 2017-08-23 PROCEDURE — 99024 POSTOP FOLLOW-UP VISIT: CPT | Performed by: ORTHOPAEDIC SURGERY

## 2017-08-23 RX ORDER — PANTOPRAZOLE SODIUM 40 MG/1
40 TABLET, DELAYED RELEASE ORAL
Status: DISCONTINUED | OUTPATIENT
Start: 2017-08-23 | End: 2017-08-25 | Stop reason: HOSPADM

## 2017-08-23 RX ORDER — CALCIUM CARBONATE 200(500)MG
2 TABLET,CHEWABLE ORAL 3 TIMES DAILY PRN
Status: DISCONTINUED | OUTPATIENT
Start: 2017-08-23 | End: 2017-08-23

## 2017-08-23 RX ORDER — FAMOTIDINE 20 MG/1
20 TABLET, FILM COATED ORAL 2 TIMES DAILY
Status: DISCONTINUED | OUTPATIENT
Start: 2017-08-23 | End: 2017-08-23

## 2017-08-23 RX ORDER — CALCIUM CARBONATE 200(500)MG
2 TABLET,CHEWABLE ORAL 2 TIMES DAILY
Status: DISCONTINUED | OUTPATIENT
Start: 2017-08-23 | End: 2017-08-25 | Stop reason: HOSPADM

## 2017-08-23 RX ORDER — OXYCODONE AND ACETAMINOPHEN 7.5; 325 MG/1; MG/1
2 TABLET ORAL EVERY 4 HOURS PRN
Status: DISCONTINUED | OUTPATIENT
Start: 2017-08-23 | End: 2017-08-25

## 2017-08-23 RX ORDER — CARBAMAZEPINE 200 MG/1
100 TABLET ORAL 2 TIMES DAILY
Status: DISCONTINUED | OUTPATIENT
Start: 2017-08-23 | End: 2017-08-25 | Stop reason: HOSPADM

## 2017-08-23 RX ADMIN — PANTOPRAZOLE SODIUM 40 MG: 40 TABLET, DELAYED RELEASE ORAL at 05:16

## 2017-08-23 RX ADMIN — PANTOPRAZOLE SODIUM 40 MG: 40 TABLET, DELAYED RELEASE ORAL at 18:42

## 2017-08-23 RX ADMIN — ATORVASTATIN CALCIUM 20 MG: 20 TABLET, FILM COATED ORAL at 20:29

## 2017-08-23 RX ADMIN — OXYCODONE HYDROCHLORIDE AND ACETAMINOPHEN 2 TABLET: 5; 325 TABLET ORAL at 00:54

## 2017-08-23 RX ADMIN — Medication 2 TABLET: at 18:42

## 2017-08-23 RX ADMIN — CYCLOBENZAPRINE HYDROCHLORIDE 10 MG: 10 TABLET, FILM COATED ORAL at 08:26

## 2017-08-23 RX ADMIN — CALCIUM CARBONATE-VITAMIN D TAB 500 MG-200 UNIT 1000 MG: 500-200 TAB at 16:28

## 2017-08-23 RX ADMIN — OXYCODONE HYDROCHLORIDE AND ACETAMINOPHEN 2 TABLET: 7.5; 325 TABLET ORAL at 09:15

## 2017-08-23 RX ADMIN — CARBAMAZEPINE 100 MG: 200 TABLET ORAL at 20:29

## 2017-08-23 RX ADMIN — OXYCODONE HYDROCHLORIDE AND ACETAMINOPHEN 2 TABLET: 7.5; 325 TABLET ORAL at 20:28

## 2017-08-23 RX ADMIN — DOCUSATE SODIUM -SENNOSIDES 1 TABLET: 50; 8.6 TABLET, COATED ORAL at 18:42

## 2017-08-23 RX ADMIN — TEMAZEPAM 15 MG: 15 CAPSULE ORAL at 20:29

## 2017-08-23 RX ADMIN — OXYCODONE HYDROCHLORIDE AND ACETAMINOPHEN 2 TABLET: 5; 325 TABLET ORAL at 05:17

## 2017-08-23 RX ADMIN — CYCLOBENZAPRINE HYDROCHLORIDE 10 MG: 10 TABLET, FILM COATED ORAL at 20:28

## 2017-08-23 RX ADMIN — CARBAMAZEPINE 200 MG: 200 TABLET ORAL at 08:26

## 2017-08-23 RX ADMIN — SODIUM CHLORIDE 100 ML/HR: 9 INJECTION, SOLUTION INTRAVENOUS at 06:49

## 2017-08-23 RX ADMIN — OXYCODONE HYDROCHLORIDE AND ACETAMINOPHEN 2 TABLET: 7.5; 325 TABLET ORAL at 12:55

## 2017-08-23 RX ADMIN — TERAZOSIN HYDROCHLORIDE 5 MG: 5 CAPSULE ORAL at 20:29

## 2017-08-23 RX ADMIN — DOCUSATE SODIUM -SENNOSIDES 1 TABLET: 50; 8.6 TABLET, COATED ORAL at 08:26

## 2017-08-23 NOTE — PLAN OF CARE
Problem: Patient Care Overview (Adult)  Goal: Plan of Care Review  Outcome: Ongoing (interventions implemented as appropriate)    08/22/17 2050 08/23/17 0338   Coping/Psychosocial Response Interventions   Plan Of Care Reviewed With patient --    Patient Care Overview   Progress --  progress towards functional goals is fair   Outcome Evaluation   Outcome Summary/Follow up Plan --  Pt. remains A&OX4 throughout shift. Pt. pain is still an issue. Pt. receiving Diluadid PCA as well as pain pills. Pt. Requiring repeated education on use of PCA. Pt. able to urinate on own this shift multiple times. VSS, will continue to monitor.       Goal: Adult Individualization and Mutuality  Outcome: Ongoing (interventions implemented as appropriate)  Goal: Discharge Needs Assessment  Outcome: Ongoing (interventions implemented as appropriate)    Problem: Perioperative Period (Adult)  Goal: Signs and Symptoms of Listed Potential Problems Will be Absent or Manageable (Perioperative Period)  Outcome: Ongoing (interventions implemented as appropriate)    Problem: Fall Risk (Adult)  Goal: Identify Related Risk Factors and Signs and Symptoms  Outcome: Ongoing (interventions implemented as appropriate)  Goal: Absence of Falls  Outcome: Ongoing (interventions implemented as appropriate)    Problem: Pain, Chronic (Adult)  Goal: Identify Related Risk Factors and Signs and Symptoms  Outcome: Ongoing (interventions implemented as appropriate)  Goal: Acceptable Pain Control/Comfort Level  Outcome: Ongoing (interventions implemented as appropriate)
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
## 2017-08-23 NOTE — PAYOR COMM NOTE
"UR CONTACT:   ANIL  P:  [phone]  F:  [phone]        Yoly Cavazos (72 y.o. Male)     Date of Birth Social Security Number Address Home Phone MRN    1945  [address]  [place] [facility] [phone] [id number]    Christian Marital Status          Unknown Unknown       Admission Date Admission Type Admitting Provider Attending Provider Department, Room/Bed    8/21/17 Elective Marcello Lopez MD Werner, Joseph G, MD [address], P595/1    Discharge Date Discharge Disposition Discharge Destination                      Attending Provider: Marcello Lopez MD     Allergies:  Morphine, Cetirizine, Moxifloxacin    Isolation:  None   Infection:  None   Code Status:  FULL    Ht:  73\" (185.4 cm)   Wt:  202 lb 11.2 oz (91.9 kg)    Admission Cmt:  None   Principal Problem:  Spinal stenosis, lumbar region [M48.06] More...                 Active Insurance as of 8/21/2017     Primary Coverage     Payor Plan Insurance Group Employer/Plan Group    [employer] [place] [employer] [id number]      Payor Plan Address Payor Plan Phone Number Effective From Effective To    [address] [phone] 4/29/2016     [place] [address]       Subscriber Name Subscriber Birth Date Member ID       YOLY CAVAZOS 1945 [id number]           Secondary Coverage     Payor Plan Insurance Group Employer/Plan Group    MEDICARE MEDICARE A ONLY      Payor Plan Address Payor Plan Phone Number Effective From Effective To    [address] [phone] 1/1/2012     [place] [address]       Subscriber Name Subscriber Birth Date Member ID       YOLY CAVAZOS 1945 [id number]                 Emergency Contacts      (Rel.) Home Phone Work Phone Mobile Phone    Jose GRobyn (Partner) [phone] -- [phone]            Lines, Drains & Airways    Active LDAs     Name:   Placement date:   Placement time:   Site:   Days:    Peripheral IV Line - Single Lumen 08/21/17 1030  18 gauge  08/21/17   "  1030      1                   Physician Progress Notes       Marcello Lopez MD at 2017  6:54 AM  Version 1 of 1         Postop day 1: AVSS awake alert oriented.  No leg pain expected back pain.  Moves legs well.  Hemoglobin 9.3.  Doing well home in a day or 2.     Electronically signed by Marcello Lopez MD at 2017  6:54 AM      Justen Araiza MD at 2017  9:59 AM  Version 2 of 2             Name: Regan Cavazos ADMIT: 2017   : 1945  PCP: Milagros Goodman MD    MRN: [id number] LOS: 1 days   AGE/SEX: 72 y.o. male  ROOM: [facility]   Subjective   Subjective  Cc- back pain  Pain moderate-severe  Not currently releived with pain medication  Symptoms of needing to pee though he has bailey in still    ROS  No f/c  No n/v  Objective   Vital Signs  Temp:  [97.4 °F (36.3 °C)-98.1 °F (36.7 °C)] 97.9 °F (36.6 °C)  Heart Rate:  [57-94] 59  Resp:  [16-20] 16  BP: (100-130)/(53-80) 100/53  SpO2:  [94 %-100 %] 99 %  on  Flow (L/min):  [2-4] 2;   O2 Device: nasal cannula  Body mass index is 26.74 kg/(m^2).    Physical Exam    Alert, in mild pain  RRR  Lungs clear decreased bs at bases  Soft, nt  No edema  +bailey    Results Review:       I reviewed the patient's new clinical results.    Results from last 7 days  Lab Units 17  0536 17  2257   HEMOGLOBIN g/dL 9.3* 10.9*     Results from last 7 days  Lab Units 17  0536   SODIUM mmol/L 137   POTASSIUM mmol/L 4.8   CHLORIDE mmol/L 102   CO2 mmol/L 24.8   BUN mg/dL 13   CREATININE mg/dL 1.00   GLUCOSE mg/dL 113*   Estimated Creatinine Clearance: 89.3 mL/min (by C-G formula based on Cr of 1).  Results from last 7 days  Lab Units 17  0536   CALCIUM mg/dL 7.7*         atorvastatin 20 mg Oral Daily   carBAMazepine 200 mg Oral Daily With Breakfast   pantoprazole 40 mg Oral Q AM   sennosides-docusate sodium 1 tablet Oral BID   terazosin 5 mg Oral Nightly       HYDROmorphone HCl-NaCl     lactated ringers 100 mL/hr    lactated ringers 9  mL/hr Last Rate: 9 mL/hr (17 1101)   sodium chloride 0.45 % with KCl 20 mEq 100 mL/hr Last Rate: 100 mL/hr (17 3179)   Diet Clear Liquid; Cardiac      Assessment/Plan   Assessment:     Active Hospital Problems (** Indicates Principal Problem)    Diagnosis Date Noted   • **Spinal stenosis, lumbar region [M48.06] 2017   • Spinal stenosis of lumbar region [M48.06] 2017      Resolved Hospital Problems    Diagnosis Date Noted Date Resolved   No resolved problems to display.       Plan:   Spinal stensosis- post op pain control, ambulation with PT. Sounds like bailey probably being removed today. Stool softeners    BPH- restarting terazosin tonight, monitor BP    HLD- on lipitor    Epilepsy- Tegratol.    D/W RN  Reviewed previous records    Disposition  TBD.      Justen Araiza MD  [facility]  17  9:59 AM     Electronically signed by Justen Araiza MD at 2017 10:12 AM      Justen Araiza MD at 2017  9:59 AM  Version 1 of 2             Name: Regan Cavazos ADMIT: 2017   : 1945  PCP: Milagros Goodman MD    MRN: [id number] LOS: 1 days   AGE/SEX: 72 y.o. male  ROOM: [facility]   Subjective   Subjective  Cc- back pain  Pain moderate-severe  Not currently releived with pain medication  Symptoms of needing to pee though he has bailey in still    ROS  No f/c  No n/v  Objective   Vital Signs  Temp:  [97.4 °F (36.3 °C)-98.1 °F (36.7 °C)] 97.9 °F (36.6 °C)  Heart Rate:  [57-94] 59  Resp:  [16-20] 16  BP: (100-130)/(53-80) 100/53  SpO2:  [94 %-100 %] 99 %  on  Flow (L/min):  [2-4] 2;   O2 Device: nasal cannula  Body mass index is 26.74 kg/(m^2).    Physical Exam    Alert, in mild pain  RRR  Lungs clear decreased bs at bases  Soft, nt  No edema  +bailey    Results Review:       I reviewed the patient's new clinical results.    Results from last 7 days  Lab Units 17  0536 17  2257   HEMOGLOBIN g/dL 9.3* 10.9*     Results from last 7  days  Lab Units 08/22/17  0536   SODIUM mmol/L 137   POTASSIUM mmol/L 4.8   CHLORIDE mmol/L 102   CO2 mmol/L 24.8   BUN mg/dL 13   CREATININE mg/dL 1.00   GLUCOSE mg/dL 113*   Estimated Creatinine Clearance: 89.3 mL/min (by C-G formula based on Cr of 1).  Results from last 7 days  Lab Units 08/22/17  0536   CALCIUM mg/dL 7.7*         atorvastatin 20 mg Oral Daily   carBAMazepine 200 mg Oral Daily With Breakfast   pantoprazole 40 mg Oral Q AM   sennosides-docusate sodium 1 tablet Oral BID   terazosin 5 mg Oral Nightly       HYDROmorphone HCl-NaCl     lactated ringers 100 mL/hr    lactated ringers 9 mL/hr Last Rate: 9 mL/hr (08/21/17 1101)   sodium chloride 0.45 % with KCl 20 mEq 100 mL/hr Last Rate: 100 mL/hr (08/21/17 8580)   Diet Clear Liquid; Cardiac      Assessment/Plan   Assessment:     Active Hospital Problems (** Indicates Principal Problem)    Diagnosis Date Noted   • **Spinal stenosis, lumbar region [M48.06] 07/31/2017   • Spinal stenosis of lumbar region [M48.06] 08/21/2017      Resolved Hospital Problems    Diagnosis Date Noted Date Resolved   No resolved problems to display.       Plan:   Spinal stensosis- post op pain control, ambulation with PT. Sounds like bailey probably being removed today. Stool softeners    BPH- restarting terazosin tonight, monitor BP    HLD- on lipitor    Epilepsy- Tegratol.      Disposition  TBD.      Justen Araiza MD  [facility]  08/22/17  9:59 AM     Electronically signed by Justen Araiza MD at 8/22/2017 10:10 AM

## 2017-08-23 NOTE — PAYOR COMM NOTE
"UR CONTACT:   ANIL  P:  [phone]   F:  [phone]      Yoly Hernandez (72 y.o. Male)     Date of Birth Social Security Number Address Home Phone MRN    1945  [address]  [place] [facility] [phone] [id number]    Restoration Marital Status          Unknown Unknown       Admission Date Admission Type Admitting Provider Attending Provider Department, Room/Bed    8/21/17 Elective Marcello Lopez MD Werner, Joseph G, MD [address], P595/1    Discharge Date Discharge Disposition Discharge Destination                      Attending Provider: Marcello Lopez MD     Allergies:  Morphine, Cetirizine, Moxifloxacin    Isolation:  None   Infection:  None   Code Status:  FULL    Ht:  73\" (185.4 cm)   Wt:  202 lb 11.2 oz (91.9 kg)    Admission Cmt:  None   Principal Problem:  Spinal stenosis, lumbar region [M48.06] More...                 Active Insurance as of 8/21/2017     Primary Coverage     Payor Plan Insurance Group Employer/Plan Group    [employer] [id number]      Payor Plan Address Payor Plan Phone Number Effective From Effective To    [address] [phone] 4/29/2016     [place] [address]       Subscriber Name Subscriber Birth Date Member ID       YOLY HERNANDEZ 1945 [id number]           Secondary Coverage     Payor Plan Insurance Group Employer/Plan Group    MEDICARE MEDICARE A ONLY      Payor Plan Address Payor Plan Phone Number Effective From Effective To    [address] [phone] 1/1/2012     [place] [address]       Subscriber Name Subscriber Birth Date Member ID       YOLY HERNANDEZ 1945 [id number]                 Emergency Contacts      (Rel.) Home Phone Work Phone Mobile Phone    Robyn Araiza (Partner) [phone] -- [phone]               Physician Progress Notes      Marcello Lopez MD at 8/23/2017  1:20 PM  Version 1 of 1         Postop day 2: AVSS awake alert oriented.  Doing well moves legs well.  The " wound is pristine.  Overall good progress.  Plan PT today home home tomorrow.     Electronically signed by Marcello Lopez MD at 8/23/2017  1:20 PM

## 2017-08-23 NOTE — PLAN OF CARE
Problem: Patient Care Overview (Adult)  Goal: Plan of Care Review    08/23/17 1613   Coping/Psychosocial Response Interventions   Plan Of Care Reviewed With patient   Outcome Evaluation   Outcome Summary/Follow up Plan good tolerance to ambulation AM and PM, donned shoes with heel lift 2' leg length discrepancy. ambulated household distance with rwx and navigated 1 step safely, requires VCs for safety techiques. will need rwx for home. educated pt and SO on mobiltiy recommendations for home including brace use and spinal precautions.

## 2017-08-23 NOTE — THERAPY TREATMENT NOTE
"Acute Care - Physical Therapy Treatment Note  [facility]     Patient Name: Regan Cavazos  : 1945  MRN: [id number]  Today's Date: 2017  Onset of Illness/Injury or Date of Surgery Date: 17  Date of Referral to PT: 17  Referring Physician: John    Admit Date: 2017    Visit Dx:    ICD-10-CM ICD-9-CM   1. Generalized weakness R53.1 780.79   2. Spinal stenosis, lumbar region M48.06 724.02   3. Spinal stenosis of lumbar region M48.06 724.02     Patient Active Problem List   Diagnosis   • Spinal stenosis, lumbar region   • Spinal stenosis of lumbar region               Adult Rehabilitation Note       17 1600 17 1000 17 1600    Rehab Assessment/Intervention    Discipline physical therapist  - physical therapist  - physical therapy assistant  -RW    Document Type therapy note (daily note)  - therapy note (daily note)  - therapy note (daily note)  -RW    Subjective Information agree to therapy;complains of;pain  - agree to therapy;complains of;pain  - agree to therapy;complains of;pain  -RW    Patient Effort, Rehab Treatment  good  - good  -RW    Precautions/Limitations fall precautions;spinal precautions;brace on when up  - fall precautions;spinal precautions;brace on when up  - brace on when up;fall precautions;spinal precautions  -RW    Recorded by [LH] Jenny Daniel, PT [LH] Jenny Daniel, PT [RW] Ashley Teague, SARANYA    Pain Assessment    Pain Assessment No/denies pain  - 0-10  - 0-10  -RW    Pain Score  --   not rated, states LBP and \"on alot of meds\"  - 5  -RW    Pain Type  Acute pain  - Acute pain;Surgical pain  -RW    Pain Location  Back  - Back  -RW    Pain Orientation   Lower  -RW    Pain Intervention(s)  Repositioned  - Medication (See MAR);Repositioned;Ambulation/increased activity  -RW    Response to Interventions  gloria  - tolerated  -RW    Recorded by [LH] Jenny Daniel, PT [LH] Jenny Daniel, PT [RW] Ashley Teague, SARANYA    Vision " Assessment/Intervention    Visual Impairment WFL  -LH WNL  -LH     Recorded by [LH] Jenny Daniel, PT [LH] Jenny Daniel, PT     Cognitive Assessment/Intervention    Current Cognitive/Communication Assessment functional  -LH functional  -LH functional  -RW    Orientation Status oriented x 4  -LH  oriented x 4  -RW    Follows Commands/Answers Questions 100% of the time  -LH  100% of the time  -RW    Personal Safety   WNL/WFL  -RW    Personal Safety Interventions   fall prevention program maintained;gait belt;nonskid shoes/slippers when out of bed  -RW    Recorded by [LH] Jenny Daniel, PT [LH] Jenny Daniel, PT [RW] Ashley Teague, PTA    Bed Mobility, Assessment/Treatment    Bed Mob, Supine to Sit, Horton minimum assist (75% patient effort)  -LH  not tested  -RW    Bed Mob, Sit to Supine, Horton not tested  -LH  minimum assist (75% patient effort);verbal cues required   assist w/ BLE  -RW    Bed Mobility, Comment   via log roll  -RW    Recorded by [LH] Jenny Daniel, PT  [RW] Ashley Teague PTA    Transfer Assessment/Treatment    Transfers, Sit-Stand Horton contact guard assist;verbal cues required  -  contact guard assist  -RW    Transfers, Stand-Sit Horton contact guard assist;verbal cues required  -  contact guard assist  -RW    Transfers, Sit-Stand-Sit, Assist Device rolling walker  -LH  rolling walker  -RW    Transfer, Comment pt req Vcs for proper technqiue in reaching back/pushing up  -LH      Recorded by [LH] Jenny Daniel, PT  [RW] Ashley Teague, SARANYA    Gait Assessment/Treatment    Gait, Horton Level contact guard assist;verbal cues required  -  contact guard assist;1 person + 1 person to manage equipment;verbal cues required  -RW    Gait, Assistive Device rolling walker  -LH  rolling walker  -RW    Gait, Distance (Feet) 160  -LH  150  -RW    Gait, Gait Pattern Analysis swing-through gait  -LH  swing-through gait  -RW    Gait, Gait Deviations   forward flexed  posture;left:;antalgic;bilateral:;jimbo decreased  -RW    Gait, Safety Issues sequencing ability decreased;step length decreased  -      Gait, Impairments pain  -      Gait, Comment leg length discrepancy  -      Recorded by [] Jenny Daniel, PT  [RW] Ashley Teague PTA    Stairs Assessment/Treatment    Number of Stairs 1  -      Stairs, Handrail Location left side (ascending)  -      Stairs, Emanuel Level contact guard assist;verbal cues required  -      Stairs, Technique Used step to step (ascending);step to step (descending)  -      Recorded by [] Jenny Daniel PT      Balance Skills Training    Standing-Level of Assistance   Contact guard  -    Static Standing Balance Support   assistive device  -    Standing Balance # of Minutes   1   stood in restroom   -    Recorded by   [RW] Ashley Teague PTA    Positioning and Restraints    Pre-Treatment Position in bed  -  sitting in chair/recliner  -RW    Post Treatment Position chair  -  bed  -RW    In Bed   fowlers;call light within reach;encouraged to call for assist;with family/caregiver  -RW    In Chair sitting;call light within reach;encouraged to call for assist;exit alarm on;with family/caregiver  -      Recorded by [] Jenny Daniel, PT  [RW] Ashley Teague PTA      User Key  (r) = Recorded By, (t) = Taken By, (c) = Cosigned By    Initials Name Effective Dates     Jenny Daniel, PT 02/07/17 -     RW Ashley Teague PTA 04/06/16 -                 IP PT Goals       08/22/17 1107          Bed Mobility PT LTG    Bed Mobility PT LTG, Date Established 08/22/17  -      Bed Mobility PT LTG, Time to Achieve 1 wk  -      Bed Mobility PT LTG, Activity Type all bed mobility  -      Bed Mobility PT LTG, Emanuel Level supervision required  -      Transfer Training PT LTG    Transfer Training PT LTG, Date Established 08/22/17  -      Transfer Training PT LTG, Time to Achieve 1 wk  -      Transfer Training PT LTG,  Activity Type all transfers  -      Transfer Training PT LTG, McCone Level supervision required  -      Gait Training PT LTG    Gait Training Goal PT LTG, Date Established 08/22/17  -      Gait Training Goal PT LTG, Time to Achieve 1 wk  -      Gait Training Goal PT LTG, McCone Level supervision required  -      Gait Training Goal PT LTG, Assist Device walker, rolling  -      Gait Training Goal PT LTG, Distance to Achieve 250  -LH        User Key  (r) = Recorded By, (t) = Taken By, (c) = Cosigned By    Initials Name Provider Type     Jenny Daniel PT Physical Therapist          Physical Therapy Education     Title: PT OT SLP Therapies (Active)     Topic: Physical Therapy (Active)     Point: Mobility training (Active)    Learning Progress Summary    Learner Readiness Method Response Comment Documented by Status   Patient Acceptance E [facility] 08/23/17 1610 Active    Acceptance E [facility] 08/22/17 1107 Active   Family Acceptance E [facility] 08/23/17 1610 Active               Point: Home exercise program (Active)    Learning Progress Summary    Learner Readiness Method Response Comment Documented by Status   Patient Acceptance E [facility] 08/23/17 1610 Active    Acceptance E [facility] 08/22/17 1107 Active   Family Acceptance E [facility] 08/23/17 1610 Active               Point: Body mechanics (Active)    Learning Progress Summary    Learner Readiness Method Response Comment Documented by Status   Patient Acceptance E [facility] 08/22/17 1107 Active               Point: Precautions (Active)    Learning Progress Summary    Learner Readiness Method Response Comment Documented by Status   Patient Acceptance E [facility] 08/22/17 1107 Active                      User Key     Initials Effective Dates Name Provider Type [facility] 02/07/17 -  Jenny Daniel PT Physical Therapist PT                    PT Recommendation and Plan  Anticipated Discharge Disposition: home with assist  Planned Therapy Interventions: balance  training, bed mobility training, gait training, home exercise program, ROM (Range of Motion), stair training, strengthening, stretching, transfer training  PT Frequency: 2 times/day  Plan of Care Review  Plan Of Care Reviewed With: patient  Outcome Summary/Follow up Plan: good tolerance to ambulation AM and PM, donned shoes with heel lift 2' leg length discrepancy. ambulated household distance with rwx and navigated 1 step safely, requires VCs for safety techiques. will need rwx for home. educated pt and SO on mobiltiy recommendations for home.           Outcome Measures       08/23/17 1600 08/22/17 1100       How much help from another person do you currently need...    Turning from your back to your side while in flat bed without using bedrails? 3  -LH 3  -LH     Moving from lying on back to sitting on the side of a flat bed without bedrails? 3  - 3  -LH     Moving to and from a bed to a chair (including a wheelchair)? 3  - 3  -LH     Standing up from a chair using your arms (e.g., wheelchair, bedside chair)? 3  - 3  -LH     Climbing 3-5 steps with a railing? 3  - 2  -LH     To walk in hospital room? 3  - 3  -LH     AM-PAC 6 Clicks Score 18  - 17  -     Functional Assessment    Outcome Measure Options AM-PAC 6 Clicks Basic Mobility (PT)  - AM-PAC 6 Clicks Basic Mobility (PT)  -       User Key  (r) = Recorded By, (t) = Taken By, (c) = Cosigned By    Initials Name Provider Type     Jenny Daniel PT Physical Therapist           Time Calculation:         PT Charges       08/23/17 1616          Time Calculation    Start Time 1555  -      Stop Time 1615  -      Time Calculation (min) 20 min  -      PT Received On 08/23/17  -      PT - Next Appointment 08/24/17  -        User Key  (r) = Recorded By, (t) = Taken By, (c) = Cosigned By    Initials Name Provider Type     Jenny Daniel PT Physical Therapist          Therapy Charges for Today     Code Description Service Date Service Provider  Modifiers Qty    58028167669 HC PT EVAL MOD COMPLEXITY 2 8/22/2017 Jenny Daniel, PT GP 1    23328759265 HC PT THER SUPP EA 15 MIN 8/23/2017 Jenny Daniel, PT GP 1    17158768329 HC PT THER PROC EA 15 MIN 8/23/2017 Jenny Daniel, PT GP 1          PT G-Codes  Outcome Measure Options: AM-PAC 6 Clicks Basic Mobility (PT)    Jenny Daniel, PT  8/23/2017

## 2017-08-23 NOTE — PROGRESS NOTES
Postop day 2: AVSS awake alert oriented.  Doing well moves legs well.  The wound is pristine.  Overall good progress.  Plan PT today home home tomorrow.

## 2017-08-23 NOTE — PLAN OF CARE
Problem: Patient Care Overview (Adult)  Goal: Plan of Care Review  Outcome: Ongoing (interventions implemented as appropriate)    08/23/17 1726   Coping/Psychosocial Response Interventions   Plan Of Care Reviewed With patient;spouse   Patient Care Overview   Progress improving   Outcome Evaluation   Outcome Summary/Follow up Plan patient alert, oriented to person, place, time and situation. Ambulating well w/ assist and walker. PCA DC'd today. Pain well controlled with pills. Dressing dry and intact. Will continue to monitor.        Goal: Adult Individualization and Mutuality  Outcome: Ongoing (interventions implemented as appropriate)  Goal: Discharge Needs Assessment  Outcome: Ongoing (interventions implemented as appropriate)    08/22/17 1300 08/23/17 1726   Discharge Needs Assessment   Concerns To Be Addressed --  no discharge needs identified   Readmission Within The Last 30 Days --  no previous admission in last 30 days   Equipment Needed After Discharge walker, rolling;commode --    Current Discharge Risk --  physical impairment   Discharge Disposition --  home or self-care   Current Health   Anticipated Changes Related to Illness --  none   Self-Care   Equipment Currently Used at Home --  none         Problem: Perioperative Period (Adult)  Goal: Signs and Symptoms of Listed Potential Problems Will be Absent or Manageable (Perioperative Period)  Outcome: Ongoing (interventions implemented as appropriate)    08/23/17 1726   Perioperative Period   Problems Assessed (Perioperative Period) pain   Problems Present (Perioperative Period) pain         Problem: Fall Risk (Adult)  Goal: Identify Related Risk Factors and Signs and Symptoms  Outcome: Ongoing (interventions implemented as appropriate)    08/23/17 1726   Fall Risk   Fall Risk: Related Risk Factors gait/mobility problems   Fall Risk: Signs and Symptoms presence of risk factors       Goal: Absence of Falls  Outcome: Ongoing (interventions implemented as  appropriate)    08/23/17 1726   Fall Risk (Adult)   Absence of Falls making progress toward outcome         Problem: Pain, Chronic (Adult)  Goal: Identify Related Risk Factors and Signs and Symptoms  Outcome: Ongoing (interventions implemented as appropriate)    08/23/17 1726   Pain, Chronic   Related Risk Factors (Chronic Pain) knowledge deficit;procedures/treatments;surgery   Signs and Symptoms (Chronic Pain) verbalization of pain/discomfort for a prolonged time period;verbalization of pain descriptors       Goal: Acceptable Pain Control/Comfort Level  Outcome: Ongoing (interventions implemented as appropriate)    08/23/17 1726   Pain, Chronic (Adult)   Acceptable Pain Control/Comfort Level making progress toward outcome

## 2017-08-23 NOTE — PROGRESS NOTES
Name: Regan Cavazos ADMIT: 2017   : 1945  PCP: Milagros Goodman MD    MRN: [id number] LOS: 2 days   AGE/SEX: 72 y.o. male  ROOM: [facility]   Subjective   Subjective  Cc- back pain    Pain is better today  Improved with medications  +gerd. Is on PPI as outpatient  Partially improved with PPI and Maalox    ROS  No f/c  No n/v  Objective   Vital Signs  Temp:  [97.8 °F (36.6 °C)-98.4 °F (36.9 °C)] 97.8 °F (36.6 °C)  Heart Rate:  [62-75] 70  Resp:  [15-18] 18  BP: ()/(52-70) 99/70  SpO2:  [91 %-100 %] 96 %  on  Flow (L/min):  [2] 2;   O2 Device: room air  Body mass index is 26.74 kg/(m^2).    Physical Exam    Alert  RRR  Lungs clear decreased bs at bases  Soft, nt  No edema      Results Review:       I reviewed the patient's new clinical results.    Results from last 7 days  Lab Units 17  0648 17  0536 17  2257   HEMOGLOBIN g/dL 8.5* 9.3* 10.9*       Results from last 7 days  Lab Units 17  0536   SODIUM mmol/L 137   POTASSIUM mmol/L 4.8   CHLORIDE mmol/L 102   CO2 mmol/L 24.8   BUN mg/dL 13   CREATININE mg/dL 1.00   GLUCOSE mg/dL 113*   Estimated Creatinine Clearance: 89.3 mL/min (by C-G formula based on Cr of 1).    Results from last 7 days  Lab Units 17  0536   CALCIUM mg/dL 7.7*         atorvastatin 20 mg Oral Nightly   calcium-vitamin D 1,000 mg Oral Daily   carBAMazepine 200 mg Oral Daily With Breakfast   famotidine 20 mg Oral BID   pantoprazole 40 mg Oral Q AM   sennosides-docusate sodium 1 tablet Oral BID   terazosin 5 mg Oral Nightly       sodium chloride 100 mL/hr Last Rate: 100 mL/hr (17 0649)   Diet Regular; Cardiac      Assessment/Plan   Assessment:     Active Hospital Problems (** Indicates Principal Problem)    Diagnosis Date Noted   • **Spinal stenosis, lumbar region [M48.06] 2017   • Spinal stenosis of lumbar region [M48.06] 2017      Resolved Hospital Problems    Diagnosis Date Noted Date Resolved   No resolved problems to display.        Plan:   Spinal stensosis- post op pain control, ambulation with PT.Stool softeners. On scds for dvt proph.    BPH- terazosin, monitor BP    HLD- on lipitor    Epilepsy- Tegratol    GERD- Increase PPI to BID for a day or two. Have added tums and maalox as well.     D/W Family  Reviewed previous records    Disposition  TBD.      Justen Araiza MD  [facility]  08/23/17  5:31 PM

## 2017-08-24 PROBLEM — D62 POSTOPERATIVE ANEMIA DUE TO ACUTE BLOOD LOSS: Status: ACTIVE | Noted: 2017-08-24

## 2017-08-24 PROBLEM — D50.9 IRON DEFICIENCY ANEMIA: Status: ACTIVE | Noted: 2017-08-24

## 2017-08-24 PROBLEM — M48.061 SPINAL STENOSIS, LUMBAR REGION: Status: ACTIVE | Noted: 2017-07-31

## 2017-08-24 LAB
ALBUMIN SERPL-MCNC: 2.5 G/DL (ref 3.5–5.2)
ALBUMIN/GLOB SERPL: 0.9 G/DL
ALP SERPL-CCNC: 29 U/L (ref 39–117)
ALT SERPL W P-5'-P-CCNC: 7 U/L (ref 1–41)
ANION GAP SERPL CALCULATED.3IONS-SCNC: 8.9 MMOL/L
AST SERPL-CCNC: 10 U/L (ref 1–40)
BASOPHILS # BLD AUTO: 0.01 10*3/MM3 (ref 0–0.2)
BASOPHILS NFR BLD AUTO: 0.2 % (ref 0–1.5)
BILIRUB SERPL-MCNC: 0.3 MG/DL (ref 0.1–1.2)
BUN BLD-MCNC: 7 MG/DL (ref 8–23)
BUN/CREAT SERPL: 8.3 (ref 7–25)
CALCIUM SPEC-SCNC: 8.2 MG/DL (ref 8.6–10.5)
CHLORIDE SERPL-SCNC: 102 MMOL/L (ref 98–107)
CO2 SERPL-SCNC: 27.1 MMOL/L (ref 22–29)
CREAT BLD-MCNC: 0.84 MG/DL (ref 0.76–1.27)
DEPRECATED RDW RBC AUTO: 51.4 FL (ref 37–54)
EOSINOPHIL # BLD AUTO: 0.16 10*3/MM3 (ref 0–0.7)
EOSINOPHIL NFR BLD AUTO: 3 % (ref 0.3–6.2)
ERYTHROCYTE [DISTWIDTH] IN BLOOD BY AUTOMATED COUNT: 15.2 % (ref 11.5–14.5)
FERRITIN SERPL-MCNC: 70.72 NG/ML (ref 30–400)
GFR SERPL CREATININE-BSD FRML MDRD: 90 ML/MIN/1.73
GLOBULIN UR ELPH-MCNC: 2.7 GM/DL
GLUCOSE BLD-MCNC: 82 MG/DL (ref 65–99)
HCT VFR BLD AUTO: 26.7 % (ref 40.4–52.2)
HGB BLD-MCNC: 8 G/DL (ref 13.7–17.6)
IMM GRANULOCYTES # BLD: 0 10*3/MM3 (ref 0–0.03)
IMM GRANULOCYTES NFR BLD: 0 % (ref 0–0.5)
IRON 24H UR-MRATE: 12 MCG/DL (ref 59–158)
IRON SATN MFR SERPL: 5 % (ref 20–50)
LYMPHOCYTES # BLD AUTO: 0.68 10*3/MM3 (ref 0.9–4.8)
LYMPHOCYTES NFR BLD AUTO: 12.7 % (ref 19.6–45.3)
MCH RBC QN AUTO: 27.6 PG (ref 27–32.7)
MCHC RBC AUTO-ENTMCNC: 30 G/DL (ref 32.6–36.4)
MCV RBC AUTO: 92.1 FL (ref 79.8–96.2)
MONOCYTES # BLD AUTO: 0.67 10*3/MM3 (ref 0.2–1.2)
MONOCYTES NFR BLD AUTO: 12.5 % (ref 5–12)
NEUTROPHILS # BLD AUTO: 3.84 10*3/MM3 (ref 1.9–8.1)
NEUTROPHILS NFR BLD AUTO: 71.6 % (ref 42.7–76)
PLATELET # BLD AUTO: 190 10*3/MM3 (ref 140–500)
PMV BLD AUTO: 8.9 FL (ref 6–12)
POTASSIUM BLD-SCNC: 4 MMOL/L (ref 3.5–5.2)
PROT SERPL-MCNC: 5.2 G/DL (ref 6–8.5)
RBC # BLD AUTO: 2.9 10*6/MM3 (ref 4.6–6)
SODIUM BLD-SCNC: 138 MMOL/L (ref 136–145)
TIBC SERPL-MCNC: 231 MCG/DL (ref 298–536)
TRANSFERRIN SERPL-MCNC: 155 MG/DL (ref 200–360)
WBC NRBC COR # BLD: 5.36 10*3/MM3 (ref 4.5–10.7)

## 2017-08-24 PROCEDURE — 82728 ASSAY OF FERRITIN: CPT | Performed by: INTERNAL MEDICINE

## 2017-08-24 PROCEDURE — 86923 COMPATIBILITY TEST ELECTRIC: CPT

## 2017-08-24 PROCEDURE — 83540 ASSAY OF IRON: CPT | Performed by: INTERNAL MEDICINE

## 2017-08-24 PROCEDURE — 84466 ASSAY OF TRANSFERRIN: CPT | Performed by: INTERNAL MEDICINE

## 2017-08-24 PROCEDURE — P9016 RBC LEUKOCYTES REDUCED: HCPCS

## 2017-08-24 PROCEDURE — 99024 POSTOP FOLLOW-UP VISIT: CPT | Performed by: ORTHOPAEDIC SURGERY

## 2017-08-24 PROCEDURE — 86901 BLOOD TYPING SEROLOGIC RH(D): CPT

## 2017-08-24 PROCEDURE — 86900 BLOOD TYPING SEROLOGIC ABO: CPT

## 2017-08-24 PROCEDURE — 80053 COMPREHEN METABOLIC PANEL: CPT | Performed by: INTERNAL MEDICINE

## 2017-08-24 PROCEDURE — 85025 COMPLETE CBC W/AUTO DIFF WBC: CPT | Performed by: ORTHOPAEDIC SURGERY

## 2017-08-24 PROCEDURE — 97110 THERAPEUTIC EXERCISES: CPT

## 2017-08-24 PROCEDURE — 36430 TRANSFUSION BLD/BLD COMPNT: CPT

## 2017-08-24 RX ORDER — FERROUS SULFATE 325(65) MG
325 TABLET ORAL 2 TIMES DAILY WITH MEALS
Qty: 60 TABLET | Refills: 0 | Status: SHIPPED | OUTPATIENT
Start: 2017-08-24

## 2017-08-24 RX ORDER — FERROUS SULFATE 325(65) MG
325 TABLET ORAL 2 TIMES DAILY WITH MEALS
Status: DISCONTINUED | OUTPATIENT
Start: 2017-08-24 | End: 2017-08-25 | Stop reason: HOSPADM

## 2017-08-24 RX ADMIN — CARBAMAZEPINE 100 MG: 200 TABLET ORAL at 16:55

## 2017-08-24 RX ADMIN — OXYCODONE HYDROCHLORIDE AND ACETAMINOPHEN 2 TABLET: 7.5; 325 TABLET ORAL at 06:21

## 2017-08-24 RX ADMIN — PANTOPRAZOLE SODIUM 40 MG: 40 TABLET, DELAYED RELEASE ORAL at 16:55

## 2017-08-24 RX ADMIN — OXYCODONE HYDROCHLORIDE AND ACETAMINOPHEN 1 TABLET: 7.5; 325 TABLET ORAL at 10:22

## 2017-08-24 RX ADMIN — ATORVASTATIN CALCIUM 20 MG: 20 TABLET, FILM COATED ORAL at 20:04

## 2017-08-24 RX ADMIN — TERAZOSIN HYDROCHLORIDE 5 MG: 5 CAPSULE ORAL at 20:04

## 2017-08-24 RX ADMIN — CARBAMAZEPINE 100 MG: 200 TABLET ORAL at 08:46

## 2017-08-24 RX ADMIN — OXYCODONE HYDROCHLORIDE AND ACETAMINOPHEN 1 TABLET: 7.5; 325 TABLET ORAL at 18:02

## 2017-08-24 RX ADMIN — CYCLOBENZAPRINE HYDROCHLORIDE 10 MG: 10 TABLET, FILM COATED ORAL at 20:44

## 2017-08-24 RX ADMIN — Medication 2 TABLET: at 08:46

## 2017-08-24 RX ADMIN — OXYCODONE HYDROCHLORIDE AND ACETAMINOPHEN 2 TABLET: 7.5; 325 TABLET ORAL at 01:20

## 2017-08-24 RX ADMIN — DOCUSATE SODIUM -SENNOSIDES 1 TABLET: 50; 8.6 TABLET, COATED ORAL at 16:55

## 2017-08-24 RX ADMIN — DOCUSATE SODIUM -SENNOSIDES 1 TABLET: 50; 8.6 TABLET, COATED ORAL at 08:46

## 2017-08-24 RX ADMIN — FERROUS SULFATE TAB 325 MG (65 MG ELEMENTAL FE) 325 MG: 325 (65 FE) TAB at 14:24

## 2017-08-24 RX ADMIN — CALCIUM CARBONATE-VITAMIN D TAB 500 MG-200 UNIT 1000 MG: 500-200 TAB at 08:46

## 2017-08-24 RX ADMIN — Medication 2 TABLET: at 16:55

## 2017-08-24 RX ADMIN — POLYETHYLENE GLYCOL 3350 17 G: 17 POWDER, FOR SOLUTION ORAL at 08:58

## 2017-08-24 RX ADMIN — FERROUS SULFATE TAB 325 MG (65 MG ELEMENTAL FE) 325 MG: 325 (65 FE) TAB at 16:55

## 2017-08-24 RX ADMIN — PANTOPRAZOLE SODIUM 40 MG: 40 TABLET, DELAYED RELEASE ORAL at 06:21

## 2017-08-24 RX ADMIN — OXYCODONE HYDROCHLORIDE AND ACETAMINOPHEN 1 TABLET: 7.5; 325 TABLET ORAL at 21:36

## 2017-08-24 NOTE — PLAN OF CARE
Problem: Patient Care Overview (Adult)  Goal: Plan of Care Review  Outcome: Ongoing (interventions implemented as appropriate)    08/24/17 1960   Coping/Psychosocial Response Interventions   Plan Of Care Reviewed With patient;family   Patient Care Overview   Progress no change   Outcome Evaluation   Outcome Summary/Follow up Plan pt alert and oriented but forgetful. VSS. up w/ 1 assist. takes a lot of encouragement. c/o incisional pain with activity. no n/t. receiving 1 unit PRBC this shift. started iron PO. possible d/c tomorrow.         Problem: Perioperative Period (Adult)  Goal: Signs and Symptoms of Listed Potential Problems Will be Absent or Manageable (Perioperative Period)  Outcome: Ongoing (interventions implemented as appropriate)    Problem: Fall Risk (Adult)  Goal: Identify Related Risk Factors and Signs and Symptoms  Outcome: Outcome(s) achieved Date Met:  08/24/17  Goal: Absence of Falls  Outcome: Ongoing (interventions implemented as appropriate)    Problem: Pain, Chronic (Adult)  Goal: Identify Related Risk Factors and Signs and Symptoms  Outcome: Outcome(s) achieved Date Met:  08/24/17  Goal: Acceptable Pain Control/Comfort Level  Outcome: Ongoing (interventions implemented as appropriate)

## 2017-08-24 NOTE — SIGNIFICANT NOTE
"   08/24/17 0831   Rehab Treatment   Discipline physical therapist   Treatment Not Performed patient/family declined treatment  (\"the way I feel right now I couldnt possibly walk.\" will follow up PM)   Recommendation   PT - Next Appointment 08/24/17     "

## 2017-08-24 NOTE — PROGRESS NOTES
Continued Stay Note  [facility]     Patient Name: Regan Cavazos  MRN: [id number]  Today's Date: 8/24/2017    Admit Date: 8/21/2017          Discharge Plan       08/24/17 1499    Case Management/Social Work Plan    Additional Comments Patient is checking to see if he has a walker at home.  He is uncertain and has a neighbor checking.  Will follow up in am.              Discharge Codes     None            Leeanna Alejandre RN

## 2017-08-24 NOTE — PLAN OF CARE
"Problem: Patient Care Overview (Adult)  Goal: Plan of Care Review    08/24/17 1104   Coping/Psychosocial Response Interventions   Plan Of Care Reviewed With patient   Outcome Evaluation   Outcome Summary/Follow up Plan constant reminders for safe mobility technqiues - brace use OOB, log roll, reaching back for chair, rwx use. Pt appears resistant to education- states \"its not like i havent done this before.\" good tolerance to ambulation in hallway this AM, hopeful DC home soon.            "

## 2017-08-24 NOTE — THERAPY TREATMENT NOTE
"Acute Care - Physical Therapy Treatment Note  [facility]     Patient Name: Regan Cavazos  : 1945  MRN: [id number]  Today's Date: 2017  Onset of Illness/Injury or Date of Surgery Date: 17  Date of Referral to PT: 17  Referring Physician: John    Admit Date: 2017    Visit Dx:    ICD-10-CM ICD-9-CM   1. Generalized weakness R53.1 780.79   2. Spinal stenosis, lumbar region M48.06 724.02   3. Spinal stenosis of lumbar region M48.06 724.02     Patient Active Problem List   Diagnosis   • Spinal stenosis, lumbar region   • Spinal stenosis of lumbar region   • Postoperative anemia due to acute blood loss   • Iron deficiency anemia               Adult Rehabilitation Note       17 1100 17 1600 17 1000    Rehab Assessment/Intervention    Discipline physical therapist  - physical therapist  - physical therapist  -    Document Type therapy note (daily note)  - therapy note (daily note)  - therapy note (daily note)  -    Subjective Information agree to therapy  - agree to therapy;complains of;pain  - agree to therapy;complains of;pain  -    Patient Effort, Rehab Treatment good  -  good  -    Precautions/Limitations brace on when up;fall precautions;spinal precautions  - fall precautions;spinal precautions;brace on when up  - fall precautions;spinal precautions;brace on when up  -LH    Recorded by [] Jenny Daniel, PT [] Jenny Daniel, PT [] Jenny Daniel, PT    Pain Assessment    Pain Assessment No/denies pain   premedicated for tx  - No/denies pain  - 0-10  -    Pain Score   --   not rated, states LBP and \"on alot of meds\"  -    Pain Type   Acute pain  -    Pain Location   Back  -    Pain Intervention(s)   Repositioned  -    Response to Interventions   gloria  -LH    Recorded by [] Jenny Daniel, PT [] Jenny Daniel, PT [] Jenny Daniel, PT    Vision Assessment/Intervention    Visual Impairment WNL  -LH WFL  -LH WNL  -LH    Recorded by " [LH] Jenny Daniel, PT [LH] Jenny Daniel, PT [LH] Jenny Daniel, PT    Cognitive Assessment/Intervention    Current Cognitive/Communication Assessment functional  - functional  - functional  -    Orientation Status oriented x 4  -LH oriented x 4  -     Follows Commands/Answers Questions 100% of the time  -% of the time  -LH     Recorded by [LH] Jenny Daniel, PT [LH] Jenny Daniel, PT [LH] Jenny Daniel, PT    Bed Mobility, Assessment/Treatment    Bed Mob, Supine to Sit, [place] supervision required  - minimum assist (75% patient effort)  -     Bed Mob, Sit to Supine, [place] not tested  - not tested  -LH     Recorded by [LH] Jenny Daniel, PT [LH] Jenny Daniel, PT     Transfer Assessment/Treatment    Transfers, Sit-Stand [place] stand by assist;contact guard assist  - contact guard assist;verbal cues required  -     Transfers, Stand-Sit [place] stand by assist;contact guard assist;verbal cues required   constant VCs for reaching back  - contact guard assist;verbal cues required  -     Transfers, Sit-Stand-Sit, Assist Device  rolling walker  -     Transfer, Comment  pt req Vcs for proper technqiue in reaching back/pushing up  -LH     Recorded by [LH] Jenny Daniel, PT [LH] Jenny Daniel, PT     Gait Assessment/Treatment    Gait, [place] Level contact guard assist  - contact guard assist;verbal cues required  -     Gait, Assistive Device rolling walker  - rolling walker  -     Gait, Distance (Feet) 160  - 160  -     Gait, Gait Pattern Analysis swing-through gait  - swing-through gait  -     Gait, Safety Issues  sequencing ability decreased;step length decreased  -     Gait, Impairments  pain  -     Gait, Comment improved mechanics w. shoes donned, leg length discprency  - leg length discrepancy  -LH     Recorded by [LH] Jenny Daniel, PT [LH] Jenny Daniel, PT     Stairs Assessment/Treatment    Number of Stairs  1  -     Stairs, Handrail Location   left side (ascending)  -     Stairs, [place] Level  contact guard assist;verbal cues required  -     Stairs, Technique Used  step to step (ascending);step to step (descending)  -     Recorded by  [] Jenny Daniel, PT     Positioning and Restraints    Pre-Treatment Position in bed  - in bed  -     Post Treatment Position chair  - chair  -     In Chair reclined;call light within reach;encouraged to call for assist;notified nsg;exit alarm on  - sitting;call light within reach;encouraged to call for assist;exit alarm on;with family/caregiver  -LH     Recorded by [LH] Jenny Daniel, PT [LH] Jenny Daniel, PT       08/22/17 1600          Rehab Assessment/Intervention    Discipline physical therapy assistant  -RW      Document Type therapy note (daily note)  -RW      Subjective Information agree to therapy;complains of;pain  -RW      Patient Effort, Rehab Treatment good  -RW      Precautions/Limitations brace on when up;fall precautions;spinal precautions  -RW      Recorded by [RW] Ashley Teague PTA      Pain Assessment    Pain Assessment 0-10  -RW      Pain Score 5  -RW      Pain Type Acute pain;Surgical pain  -RW      Pain Location Back  -RW      Pain Orientation Lower  -RW      Pain Intervention(s) Medication (See MAR);Repositioned;Ambulation/increased activity  -RW      Response to Interventions tolerated  -RW      Recorded by [RW] Ashley Teague PTA      Cognitive Assessment/Intervention    Current Cognitive/Communication Assessment functional  -RW      Orientation Status oriented x 4  -RW      Follows Commands/Answers Questions 100% of the time  -RW      Personal Safety WNL/WFL  -RW      Personal Safety Interventions fall prevention program maintained;gait belt;nonskid shoes/slippers when out of bed  -RW      Recorded by [RW] Ashley Teague PTA      Bed Mobility, Assessment/Treatment    Bed Mob, Supine to Sit, [place] not tested  -RW      Bed Mob, Sit to Supine, [place] minimum  assist (75% patient effort);verbal cues required   assist w/ BLE  -RW      Bed Mobility, Comment via log roll  -RW      Recorded by [RW] Ashley Teague PTA      Transfer Assessment/Treatment    Transfers, Sit-Stand [place] contact guard assist  -RW      Transfers, Stand-Sit [place] contact guard assist  -RW      Transfers, Sit-Stand-Sit, Assist Device rolling walker  -RW      Recorded by [RW] Ashley Teague PTA      Gait Assessment/Treatment    Gait, [place] Level contact guard assist;1 person + 1 person to manage equipment;verbal cues required  -RW      Gait, Assistive Device rolling walker  -RW      Gait, Distance (Feet) 150  -RW      Gait, Gait Pattern Analysis swing-through gait  -RW      Gait, Gait Deviations forward flexed posture;left:;antalgic;bilateral:;jimbo decreased  -RW      Recorded by [RW] Ashley Teague PTA      Balance Skills Training    Standing-Level of Assistance Contact guard  -RW      Static Standing Balance Support assistive device  -RW      Standing Balance # of Minutes 1   stood in restroom   -RW      Recorded by [RW] Ashley Teague PTA      Positioning and Restraints    Pre-Treatment Position sitting in chair/recliner  -RW      Post Treatment Position bed  -RW      In Bed fowlers;call light within reach;encouraged to call for assist;with family/caregiver  -RW      Recorded by [RW] Ashley Teague PTA        User Key  (r) = Recorded By, (t) = Taken By, (c) = Cosigned By    Initials Name Effective Dates     Jenny Daniel, PT 02/07/17 -     RW Ashley Teague, PTA 04/06/16 -                 IP PT Goals       08/22/17 1107          Bed Mobility PT LTG    Bed Mobility PT LTG, Date Established 08/22/17  -      Bed Mobility PT LTG, Time to Achieve 1 wk  -      Bed Mobility PT LTG, Activity Type all bed mobility  -      Bed Mobility PT LTG, [place] Level supervision required  -      Transfer Training PT LTG    Transfer Training PT LTG, Date Established  08/22/17  -      Transfer Training PT LTG, Time to Achieve 1 wk  -      Transfer Training PT LTG, Activity Type all transfers  -      Transfer Training PT LTG, [place] Level supervision required  -      Gait Training PT LTG    Gait Training Goal PT LTG, Date Established 08/22/17  -      Gait Training Goal PT LTG, Time to Achieve 1 wk  -LH      Gait Training Goal PT LTG, [place] Level supervision required  -      Gait Training Goal PT LTG, Assist Device walker, rolling  -LH      Gait Training Goal PT LTG, Distance to Achieve 250  -LH        User Key  (r) = Recorded By, (t) = Taken By, (c) = Cosigned By    Initials Name Provider Type     Jenyn Daniel, PT Physical Therapist          Physical Therapy Education     Title: PT OT SLP Therapies (Active)     Topic: Physical Therapy (Active)     Point: Mobility training (Active)    Learning Progress Summary    Learner Readiness Method Response Comment Documented by Status   Patient Acceptance E [facility] 08/24/17 1104 Active    Acceptance E [facility] 08/23/17 1610 Active    Acceptance E [facility] 08/22/17 1107 Active   Family Acceptance E [facility] 08/23/17 1610 Active               Point: Home exercise program (Active)    Learning Progress Summary    Learner Readiness Method Response Comment Documented by Status   Patient Acceptance E [facility] 08/24/17 1104 Active    Acceptance E [facility] 08/23/17 1610 Active    Acceptance E [facility] 08/22/17 1107 Active   Family Acceptance E [facility] 08/23/17 1610 Active               Point: Body mechanics (Active)    Learning Progress Summary    Learner Readiness Method Response Comment Documented by Status   Patient Acceptance E [facility] 08/24/17 1104 Active    Acceptance E [facility] 08/22/17 1107 Active               Point: Precautions (Active)    Learning Progress Summary    Learner Readiness Method Response Comment Documented by Status   Patient Acceptance E [facility] 08/24/17 1104 Active    Acceptance E [facility] 08/22/17 1107 Active        "               User Key     Initials Effective Dates Name Provider Type Discipline     02/07/17 -  Jenny Daniel PT Physical Therapist PT                    PT Recommendation and Plan  Anticipated Discharge Disposition: home with assist  Planned Therapy Interventions: balance training, bed mobility training, gait training, home exercise program, ROM (Range of Motion), stair training, strengthening, stretching, transfer training  PT Frequency: daily  Plan of Care Review  Plan Of Care Reviewed With: patient  Outcome Summary/Follow up Plan: constant reminders for safe technqiues - brace use OOB, log roll. pt states \"its not like i havent done this before.\"  good tolerance to ambulation in hallway this AM, hopeful DC home soon.           Outcome Measures       08/24/17 1100 08/23/17 1600 08/22/17 1100    How much help from another person do you currently need...    Turning from your back to your side while in flat bed without using bedrails? 3  - 3  - 3  -LH    Moving from lying on back to sitting on the side of a flat bed without bedrails? 3  - 3  - 3  -LH    Moving to and from a bed to a chair (including a wheelchair)? 3  - 3  - 3  -LH    Standing up from a chair using your arms (e.g., wheelchair, bedside chair)? 3  - 3  - 3  -LH    Climbing 3-5 steps with a railing? 3  - 3  - 2  -LH    To walk in hospital room? 3  - 3  -LH 3  -LH    AM-PAC 6 Clicks Score 18  -LH 18  -LH 17  -LH    Functional Assessment    Outcome Measure Options AM-PAC 6 Clicks Basic Mobility (PT)  - AM-PAC 6 Clicks Basic Mobility (PT)  - AM-PAC 6 Clicks Basic Mobility (PT)  -      User Key  (r) = Recorded By, (t) = Taken By, (c) = Cosigned By    Initials Name Provider Type     Jenny Daniel PT Physical Therapist           Time Calculation:         PT Charges       08/24/17 1106 08/24/17 0831       Time Calculation    Start Time 1037  -      Stop Time 1050  -      Time Calculation (min) 13 min  -      PT Received " On 08/24/17  -      PT - Next Appointment 08/25/17  - 08/24/17  -       User Key  (r) = Recorded By, (t) = Taken By, (c) = Cosigned By    Initials Name Provider Type     Jenny Daniel PT Physical Therapist          Therapy Charges for Today     Code Description Service Date Service Provider Modifiers Qty    58083094249 HC PT THER SUPP EA 15 MIN 8/23/2017 Jenny Daniel, PT GP 1    09711775479 HC PT THER PROC EA 15 MIN 8/23/2017 Jenny Daniel, PT GP 1    37654739711 HC PT THER SUPP EA 15 MIN 8/24/2017 Jenny Daniel, PT GP 1    32600283890 HC PT THER PROC EA 15 MIN 8/24/2017 Jenny Daniel, PT GP 1          PT G-Codes  Outcome Measure Options: AM-PAC 6 Clicks Basic Mobility (PT)    Jenny Daniel, PT  8/24/2017

## 2017-08-24 NOTE — PAYOR COMM NOTE
"Yoly Hernandez (72 y.o. Male)     Date of Birth Social Security Number Address Home Phone MRN    1945  [address] [place] [facility] [phone] [id number]    M[employer]onism Marital Status          Unknown Unknown       Admission Date Admission Type Admitting Provider Attending Provider Department, Room/Bed    17 Elective Marcello Lopez MD Werner, Joseph G, MD [address], [address]    Discharge Date Discharge Disposition Discharge Destination                      Attending Provider: Marcello Lopez MD     Allergies:  Morphine, Cetirizine, Moxifloxacin    Isolation:  None   Infection:  None   Code Status:  FULL    Ht:  73\" (185.4 cm)   Wt:  202 lb 11.2 oz (91.9 kg)    Admission Cmt:  None   Principal Problem:  Spinal stenosis, lumbar region [M48.06] More...                 Active Insurance as of 2017     Primary Coverage     Payor Plan Insurance Group Employer/Plan Group    [employer] [id number]      Payor Plan Address Payor Plan Phone Number Effective From Effective To    [address] [phone] 2016     [place] [address]       Subscriber Name Subscriber Birth Date Member ID       YOLY HERNANDEZ 1945 [id number]           Secondary Coverage     Payor Plan Insurance Group Employer/Plan Group    MEDICARE MEDICARE A ONLY      Payor Plan Address Payor Plan Phone Number Effective From Effective To    [address] [phone] 2012     [place] [address]       Subscriber Name Subscriber Birth Date Member ID       YOLY HERNANDEZ 1945 [id number]                 Emergency Contacts      (Rel.) Home Phone Work Phone Mobile Phone    Robyn Araiza (Partner) [phone] -- [phone]               Physician Progress Notes           Sandip Ruiz MD at 2017 11:09 AM  Version 1 of 1             Name: Yoly Hernandez ADMIT: 2017   : 1945  PCP: Milagros Goodman MD    MRN: [id number] LOS: 3 days   AGE/SEX: " 72 y.o. male  ROOM: P595/1   Subjective   Subjective  CC/Reason for follow-up: dizziness/fatigue  No acute events.  C/o dizziness on standing and fatigue.  Pain is well-controlled.  Taking PO well.  Denies n/v/d.  Urinating lynn naranjo removed.  Objective   Vital Signs  Temp:  [97.8 °F (36.6 °C)-99.7 °F (37.6 °C)] 98.7 °F (37.1 °C)  Heart Rate:  [67-76] 67  Resp:  [17-18] 18  BP: ()/(52-74) 106/54  SpO2:  [94 %-96 %] 95 %  on   ;   O2 Device: room air  Body mass index is 26.74 kg/(m^2).    Physical Exam  General: alert, no distress  Head: NCAT  Eyes: EOMI, conjunctivae normal  Mouth/Throat: Oropharynx clear and moist  Neck: supple, no JVD  Cardiac: Normal rate, regular rhythm  Respiratory: Normal effort, clear to ascultation  Abdomen: soft, non-tender, bowel sounds are normoactive  Musculoskeletal:  no deformity, no tenderness  Extremities: well-perfused, no cyanosis, no edema  Neuro: oriented x3, no gross deficits  Psych: mood and affect appropriate  Results Review:       I reviewed the patient's new clinical results.    Results from last 7 days  Lab Units 08/24/17  0549 08/23/17  0648 08/22/17  0536 08/21/17  2257   WBC 10*3/mm3 5.36  --   --   --    HEMOGLOBIN g/dL 8.0* 8.5* 9.3* 10.9*   PLATELETS 10*3/mm3 190  --   --   --      Results from last 7 days  Lab Units 08/24/17  0549 08/22/17  0536   SODIUM mmol/L 138 137   POTASSIUM mmol/L 4.0 4.8   CHLORIDE mmol/L 102 102   CO2 mmol/L 27.1 24.8   BUN mg/dL 7* 13   CREATININE mg/dL 0.84 1.00   GLUCOSE mg/dL 82 113*   Estimated Creatinine Clearance: 106.4 mL/min (by C-G formula based on Cr of 0.84).  Results from last 7 days  Lab Units 08/24/17  0549 08/22/17  0536   CALCIUM mg/dL 8.2* 7.7*   ALBUMIN g/dL 2.50*  --          atorvastatin 20 mg Oral Nightly   calcium carbonate 2 tablet Oral BID   calcium-vitamin D 1,000 mg Oral Daily   carBAMazepine 100 mg Oral BID   ferrous sulfate 325 mg Oral BID With Meals   pantoprazole 40 mg Oral BID AC   sennosides-docusate  sodium 1 tablet Oral BID   terazosin 5 mg Oral Nightly       sodium chloride 100 mL/hr Last Rate: 100 mL/hr (08/23/17 0649)   Diet Regular; Cardiac      Assessment/Plan   Assessment:     Active Hospital Problems (** Indicates Principal Problem)    Diagnosis Date Noted   • **Spinal stenosis, lumbar region [M48.06] 07/31/2017   • Postoperative anemia due to acute blood loss [D62] 08/24/2017   • Iron deficiency anemia [D50.9] 08/24/2017   • Spinal stenosis of lumbar region [M48.06] 08/21/2017      Resolved Hospital Problems    Diagnosis Date Noted Date Resolved   No resolved problems to display.       Plan:   Lumbar Spinal Stenosis  -s/p laminectomy/decompression/fusion 8/21 per Dr. Lopez  -continue pain control, mobility efforts per primary team    Postoperative anemia, symptomatic with dizziness and fatigue  -no evidence of ongoing blood loss  -will check orthostatics   -transfuse 1 unit of PRBCs  -I have checked iron stores this AM, started on Fe replacement for which he can follow up with his PCP after discharge    BPH  -on terazosin, voiding well    Epilepsy  -on tegretol    GERD  -on PPI BID here  -can switch back to once daily ppi at discharge    DVT prophylaxis  -TEDs/SCDs     Disposition  Per primary. Okay to discharge from medicine standpoint after transfusion if his symptoms have resolved.      Sandip Ruiz MD  [facility]  08/24/17  11:09 AM     Electronically signed by Sandip Ruiz MD at 8/24/2017  1:30 PM

## 2017-08-24 NOTE — PLAN OF CARE
Problem: Patient Care Overview (Adult)  Goal: Plan of Care Review  Outcome: Ongoing (interventions implemented as appropriate)    08/24/17 0431   Coping/Psychosocial Response Interventions   Plan Of Care Reviewed With patient   Patient Care Overview   Progress progress toward functional goals as expected   Outcome Evaluation   Outcome Summary/Follow up Plan patient complaining of intense pain with repositioning, pain meds and muscle relaxers on board, slept well throughout night.         Problem: Pain, Chronic (Adult)  Goal: Identify Related Risk Factors and Signs and Symptoms  Outcome: Ongoing (interventions implemented as appropriate)  Goal: Acceptable Pain Control/Comfort Level  Outcome: Ongoing (interventions implemented as appropriate)

## 2017-08-24 NOTE — PAYOR COMM NOTE
"Yoly Cavazos (72 y.o. Male)     Date of Birth Social Security Number Address Home Phone MRN    1945  [address]  [place] [facility] [phone] [id number]    Yaz[employer]ism Marital Status          Unknown Unknown       Admission Date Admission Type Admitting Provider Attending Provider Department, Room/Bed    8/21/17 Elective Marcello Lopez MD Werner, Joseph G, MD [address], [address]    Discharge Date Discharge Disposition Discharge Destination                      Attending Provider: Marcello Lopez MD     Allergies:  Morphine, Cetirizine, Moxifloxacin    Isolation:  None   Infection:  None   Code Status:  FULL    Ht:  73\" (185.4 cm)   Wt:  202 lb 11.2 oz (91.9 kg)    Admission Cmt:  None   Principal Problem:  Spinal stenosis, lumbar region [M48.06] More...                 Active Insurance as of 8/21/2017     Primary Coverage     Payor Plan Insurance Group Employer/Plan Group    [employer] [place] [employer] [id number]      Payor Plan Address Payor Plan Phone Number Effective From Effective To    [address] [phone] 4/29/2016     [place] [address]       Subscriber Name Subscriber Birth Date Member ID       YOLY CAVAZOS 1945 [id number]           Secondary Coverage     Payor Plan Insurance Group Employer/Plan Group    MEDICARE MEDICARE A ONLY      Payor Plan Address Payor Plan Phone Number Effective From Effective To    [address] [phone] 1/1/2012     [place] [address]       Subscriber Name Subscriber Birth Date Member ID       YOLY CAVAZOS 1945 [id number]                 Emergency Contacts      (Rel.) Home Phone Work Phone Mobile Phone    Robyn Araiza (Partner) [phone] -- [phone]               Physician Progress Notes       Marcello Lopez MD at 8/23/2017  1:20 PM  Version 1 of 1         Postop day 2: AVSS awake alert oriented.  Doing well moves legs well.  The wound is pristine.  Overall good progress.  Plan PT today " home home tomorrow.     Electronically signed by Marcello Lopez MD at 2017  1:20 PM      Justen Araiza MD at 2017  5:31 PM  Version 1 of 1             Name: Regan Cavazos ADMIT: 2017   : 1945  PCP: Milagros Goodman MD    MRN: [id number] LOS: 2 days   AGE/SEX: 72 y.o. male  ROOM: [facility]   Subjective   Subjective  Cc- back pain    Pain is better today  Improved with medications  +gerd. Is on PPI as outpatient  Partially improved with PPI and Maalox    ROS  No f/c  No n/v  Objective   Vital Signs  Temp:  [97.8 °F (36.6 °C)-98.4 °F (36.9 °C)] 97.8 °F (36.6 °C)  Heart Rate:  [62-75] 70  Resp:  [15-18] 18  BP: ()/(52-70) 99/70  SpO2:  [91 %-100 %] 96 %  on  Flow (L/min):  [2] 2;   O2 Device: room air  Body mass index is 26.74 kg/(m^2).    Physical Exam    Alert  RRR  Lungs clear decreased bs at bases  Soft, nt  No edema      Results Review:       I reviewed the patient's new clinical results.    Results from last 7 days  Lab Units 17  0648 17  0536 17  2257   HEMOGLOBIN g/dL 8.5* 9.3* 10.9*       Results from last 7 days  Lab Units 17  0536   SODIUM mmol/L 137   POTASSIUM mmol/L 4.8   CHLORIDE mmol/L 102   CO2 mmol/L 24.8   BUN mg/dL 13   CREATININE mg/dL 1.00   GLUCOSE mg/dL 113*   Estimated Creatinine Clearance: 89.3 mL/min (by C-G formula based on Cr of 1).    Results from last 7 days  Lab Units 17  0536   CALCIUM mg/dL 7.7*         atorvastatin 20 mg Oral Nightly   calcium-vitamin D 1,000 mg Oral Daily   carBAMazepine 200 mg Oral Daily With Breakfast   famotidine 20 mg Oral BID   pantoprazole 40 mg Oral Q AM   sennosides-docusate sodium 1 tablet Oral BID   terazosin 5 mg Oral Nightly       sodium chloride 100 mL/hr Last Rate: 100 mL/hr (17 0649)   Diet Regular; Cardiac      Assessment/Plan   Assessment:     Active Hospital Problems (** Indicates Principal Problem)    Diagnosis Date Noted   • **Spinal stenosis, lumbar region [M48.06]  07/31/2017   • Spinal stenosis of lumbar region [M48.06] 08/21/2017      Resolved Hospital Problems    Diagnosis Date Noted Date Resolved   No resolved problems to display.       Plan:   Spinal stensosis- post op pain control, ambulation with PT.Stool softeners. On scds for dvt proph.    BPH- terazosin, monitor BP    HLD- on lipitor    Epilepsy- Tegratol    GERD- Increase PPI to BID for a day or two. Have added tums and maalox as well.     D/W Family  Reviewed previous records    Disposition  TBD.      Justen Araiza MD  [facility]  08/23/17  5:31 PM     Electronically signed by Justen Araiza MD at 8/23/2017  5:34 PM
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
## 2017-08-24 NOTE — PROGRESS NOTES
Name: Regan Cavazos ADMIT: 2017   : 1945  PCP: Milagros Goodman MD    MRN: [id number] LOS: 3 days   AGE/SEX: 72 y.o. male  ROOM: [facility]   Subjective   Subjective  CC/Reason for follow-up: dizziness/fatigue  No acute events.  C/o dizziness on standing and fatigue.  Pain is well-controlled.  Taking PO well.  Denies n/v/d.  Urinating okay, bailey removed.  Objective   Vital Signs  Temp:  [97.8 °F (36.6 °C)-99.7 °F (37.6 °C)] 98.7 °F (37.1 °C)  Heart Rate:  [67-76] 67  Resp:  [17-18] 18  BP: ()/(52-74) 106/54  SpO2:  [94 %-96 %] 95 %  on   ;   O2 Device: room air  Body mass index is 26.74 kg/(m^2).    Physical Exam  General: alert, no distress  Head: NCAT  Eyes: EOMI, conjunctivae normal  Mouth/Throat: Oropharynx clear and moist  Neck: supple, no JVD  Cardiac: Normal rate, regular rhythm  Respiratory: Normal effort, clear to ascultation  Abdomen: soft, non-tender, bowel sounds are normoactive  Musculoskeletal: no deformity, no tenderness  Extremities: well-perfused, no cyanosis, no edema  Neuro: oriented x3, no gross deficits  Psych: mood and affect appropriate  Results Review:       I reviewed the patient's new clinical results.    Results from last 7 days  Lab Units 17  0549 17  0648 17  0536 17  2257   WBC 10*3/mm3 5.36  --   --   --    HEMOGLOBIN g/dL 8.0* 8.5* 9.3* 10.9*   PLATELETS 10*3/mm3 190  --   --   --      Results from last 7 days  Lab Units 17  0549 17  0536   SODIUM mmol/L 138 137   POTASSIUM mmol/L 4.0 4.8   CHLORIDE mmol/L 102 102   CO2 mmol/L 27.1 24.8   BUN mg/dL 7* 13   CREATININE mg/dL 0.84 1.00   GLUCOSE mg/dL 82 113*   Estimated Creatinine Clearance: 106.4 mL/min (by C-G formula based on Cr of 0.84).  Results from last 7 days  Lab Units 17  0549 17  0536   CALCIUM mg/dL 8.2* 7.7*   ALBUMIN g/dL 2.50*  --          atorvastatin 20 mg Oral Nightly   calcium carbonate 2 tablet Oral BID   calcium-vitamin D 1,000 mg Oral Daily    carBAMazepine 100 mg Oral BID   ferrous sulfate 325 mg Oral BID With Meals   pantoprazole 40 mg Oral BID AC   sennosides-docusate sodium 1 tablet Oral BID   terazosin 5 mg Oral Nightly       sodium chloride 100 mL/hr Last Rate: 100 mL/hr (08/23/17 0649)   Diet Regular; Cardiac      Assessment/Plan   Assessment:     Active Hospital Problems (** Indicates Principal Problem)    Diagnosis Date Noted   • **Spinal stenosis, lumbar region [M48.06] 07/31/2017   • Postoperative anemia due to acute blood loss [D62] 08/24/2017   • Iron deficiency anemia [D50.9] 08/24/2017   • Spinal stenosis of lumbar region [M48.06] 08/21/2017      Resolved Hospital Problems    Diagnosis Date Noted Date Resolved   No resolved problems to display.       Plan:   Lumbar Spinal Stenosis  -s/p laminectomy/decompression/fusion 8/21 per Dr. Lopez  -continue pain control, mobility efforts per primary team    Postoperative anemia, symptomatic with dizziness and fatigue  -no evidence of ongoing blood loss  -will check orthostatics   -transfuse 1 unit of PRBCs  -I have checked iron stores this AM, started on Fe replacement for which he can follow up with his PCP after discharge    BPH  -on terazosin, voiding well    Epilepsy  -on tegretol    GERD  -on PPI BID here  -can switch back to once daily ppi at discharge    DVT prophylaxis  -TEDs/SCDs     Disposition  Per primary. Okay to discharge from medicine standpoint after transfusion if his symptoms have resolved.      Sandip Ruiz MD  [facility]  08/24/17  11:09 AM

## 2017-08-25 ENCOUNTER — TELEPHONE (OUTPATIENT)
Dept: ORTHOPEDIC SURGERY | Facility: CLINIC | Age: 72
End: 2017-08-25

## 2017-08-25 VITALS
WEIGHT: 202.7 LBS | RESPIRATION RATE: 18 BRPM | HEIGHT: 73 IN | BODY MASS INDEX: 26.86 KG/M2 | DIASTOLIC BLOOD PRESSURE: 66 MMHG | HEART RATE: 76 BPM | SYSTOLIC BLOOD PRESSURE: 119 MMHG | TEMPERATURE: 97.9 F | OXYGEN SATURATION: 95 %

## 2017-08-25 LAB
ABO + RH BLD: NORMAL
BH BB BLOOD EXPIRATION DATE: NORMAL
BH BB BLOOD TYPE BARCODE: 9500
BH BB DISPENSE STATUS: NORMAL
BH BB PRODUCT CODE: NORMAL
BH BB UNIT NUMBER: NORMAL
UNIT  ABO: NORMAL
UNIT  RH: NORMAL

## 2017-08-25 PROCEDURE — 99024 POSTOP FOLLOW-UP VISIT: CPT | Performed by: ORTHOPAEDIC SURGERY

## 2017-08-25 PROCEDURE — 97110 THERAPEUTIC EXERCISES: CPT

## 2017-08-25 RX ORDER — CYCLOBENZAPRINE HCL 10 MG
5 TABLET ORAL 3 TIMES DAILY PRN
Status: DISCONTINUED | OUTPATIENT
Start: 2017-08-25 | End: 2017-08-25 | Stop reason: HOSPADM

## 2017-08-25 RX ORDER — OXYCODONE HYDROCHLORIDE AND ACETAMINOPHEN 5; 325 MG/1; MG/1
1 TABLET ORAL EVERY 4 HOURS PRN
Status: DISCONTINUED | OUTPATIENT
Start: 2017-08-25 | End: 2017-08-25 | Stop reason: HOSPADM

## 2017-08-25 RX ORDER — OXYCODONE HYDROCHLORIDE AND ACETAMINOPHEN 5; 325 MG/1; MG/1
TABLET ORAL
Qty: 50 TABLET | Refills: 0
Start: 2017-08-25 | End: 2017-09-01 | Stop reason: SDUPTHER

## 2017-08-25 RX ORDER — POLYETHYLENE GLYCOL 3350 17 G/17G
17 POWDER, FOR SOLUTION ORAL DAILY
Qty: 225 G | Refills: 0
Start: 2017-08-25

## 2017-08-25 RX ORDER — POLYETHYLENE GLYCOL 3350 17 G/17G
17 POWDER, FOR SOLUTION ORAL DAILY
Qty: 225 G | Refills: 0 | Status: SHIPPED | OUTPATIENT
Start: 2017-08-25 | End: 2017-08-25

## 2017-08-25 RX ORDER — SENNA AND DOCUSATE SODIUM 50; 8.6 MG/1; MG/1
1 TABLET, FILM COATED ORAL 2 TIMES DAILY
Qty: 60 TABLET | Refills: 0
Start: 2017-08-25 | End: 2017-09-01 | Stop reason: SDUPTHER

## 2017-08-25 RX ORDER — SENNA AND DOCUSATE SODIUM 50; 8.6 MG/1; MG/1
1 TABLET, FILM COATED ORAL 2 TIMES DAILY
Qty: 60 TABLET | Refills: 0 | Status: SHIPPED | OUTPATIENT
Start: 2017-08-25 | End: 2017-08-25

## 2017-08-25 RX ORDER — OXYCODONE HYDROCHLORIDE AND ACETAMINOPHEN 5; 325 MG/1; MG/1
2 TABLET ORAL EVERY 4 HOURS PRN
Status: DISCONTINUED | OUTPATIENT
Start: 2017-08-25 | End: 2017-08-25 | Stop reason: HOSPADM

## 2017-08-25 RX ADMIN — CALCIUM CARBONATE-VITAMIN D TAB 500 MG-200 UNIT 1000 MG: 500-200 TAB at 08:57

## 2017-08-25 RX ADMIN — POLYETHYLENE GLYCOL 3350 17 G: 17 POWDER, FOR SOLUTION ORAL at 09:02

## 2017-08-25 RX ADMIN — PANTOPRAZOLE SODIUM 40 MG: 40 TABLET, DELAYED RELEASE ORAL at 05:10

## 2017-08-25 RX ADMIN — OXYCODONE HYDROCHLORIDE AND ACETAMINOPHEN 2 TABLET: 7.5; 325 TABLET ORAL at 09:02

## 2017-08-25 RX ADMIN — CARBAMAZEPINE 100 MG: 200 TABLET ORAL at 08:57

## 2017-08-25 RX ADMIN — OXYCODONE HYDROCHLORIDE AND ACETAMINOPHEN 1 TABLET: 7.5; 325 TABLET ORAL at 04:59

## 2017-08-25 RX ADMIN — DOCUSATE SODIUM -SENNOSIDES 1 TABLET: 50; 8.6 TABLET, COATED ORAL at 08:57

## 2017-08-25 RX ADMIN — Medication 2 TABLET: at 08:57

## 2017-08-25 RX ADMIN — OXYCODONE HYDROCHLORIDE AND ACETAMINOPHEN 2 TABLET: 5; 325 TABLET ORAL at 13:08

## 2017-08-25 RX ADMIN — FERROUS SULFATE TAB 325 MG (65 MG ELEMENTAL FE) 325 MG: 325 (65 FE) TAB at 08:57

## 2017-08-25 NOTE — DISCHARGE PLACEMENT REQUEST
"Yoly Cavazos (72 y.o. Male)     Date of Birth Social Security Number Address Home Phone MRN    1945  [address]  [place] [facility] [phone] [id number]    Holiness Marital Status          Unknown Unknown       Admission Date Admission Type Admitting Provider Attending Provider Department, Room/Bed    8/21/17 Elective Marcello Lopez MD  [address], P595/1    Discharge Date Discharge Disposition Discharge Destination        8/25/2017 Home or Self Care             Attending Provider: (none)    Allergies:  Morphine, Cetirizine, Moxifloxacin    Isolation:  None   Infection:  None   Code Status:  Prior    Ht:  73\" (185.4 cm)   Wt:  202 lb 11.2 oz (91.9 kg)    Admission Cmt:  None   Principal Problem:  Spinal stenosis, lumbar region [M48.06] More...                 Active Insurance as of 8/21/2017     Primary Coverage     Payor Plan Insurance Group Employer/Plan Group    [employer] [id number]      Payor Plan Address Payor Plan Phone Number Effective From Effective To    [address] [facility] [phone] 4/29/2016     [place] [address]       Subscriber Name Subscriber Birth Date Member ID       YOLY CAVAZOS 1945 [id number]           Secondary Coverage     Payor Plan Insurance Group Employer/Plan Group    MEDICARE MEDICARE A ONLY      Payor Plan Address Payor Plan Phone Number Effective From Effective To    [address] [phone] 1/1/2012     [place] [address]       Subscriber Name Subscriber Birth Date Member ID       YOLY CAVAZOS 1945 [id number]                 Emergency Contacts      (Rel.) Home Phone Work Phone Mobile Phone    Robyn Araiza (Partner) [phone] -- [phone]              "
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
## 2017-08-25 NOTE — TELEPHONE ENCOUNTER
Have spoken with the patient's fiarte.  Apparently after the ride home he had a moderate amount of bloody drainage from the distal end of his incision.  His fiancée says it looks like the suture at the distal end of the incision has pulled loose however the incision has not opened up.  Have advised her to paint the area with Betadine and apply a bulky pressure dressing and to leave it on unless it bleeds through.  They have not heard from any home health yet so I will contact the discharge planner at the hospital to see what home health agency supposed to be available to see him tomorrow.

## 2017-08-25 NOTE — PAYOR COMM NOTE
"Yoly Cavazos (72 y.o. Male)     Date of Birth Social Security Number Address Home Phone MRN    1945  [id number]  [place] [facility] [phone] [id number]    Faith Marital Status          Unknown Unknown       Admission Date Admission Type Admitting Provider Attending Provider Department, Room/Bed    8/21/17 Elective Marcello Lopez MD  [address], P595/1    Discharge Date Discharge Disposition Discharge Destination        8/25/2017 Home or Self Care             Attending Provider: (none)    Allergies:  Morphine, Cetirizine, Moxifloxacin    Isolation:  None   Infection:  None   Code Status:  FULL    Ht:  73\" (185.4 cm)   Wt:  202 lb 11.2 oz (91.9 kg)    Admission Cmt:  None   Principal Problem:  Spinal stenosis, lumbar region [M48.06] More...                 Active Insurance as of 8/21/2017     Primary Coverage     Payor Plan Insurance Group Employer/Plan Group    [employer] [id number]      Payor Plan Address Payor Plan Phone Number Effective From Effective To    [address] [facility] [phone] 4/29/2016     [place] [address]       Subscriber Name Subscriber Birth Date Member ID       YOLY CAVAZOS 1945 [id number]           Secondary Coverage     Payor Plan Insurance Group Employer/Plan Group    MEDICARE MEDICARE A ONLY      Payor Plan Address Payor Plan Phone Number Effective From Effective To    [address] [phone] 1/1/2012     [place] [address]       Subscriber Name Subscriber Birth Date Member ID       YOLY CAVAZOS 1945 [id number]                 Emergency Contacts      (Rel.) Home Phone Work Phone Mobile Phone    Robyn Araiza (Partner) [phone] -- [phone]          "

## 2017-08-25 NOTE — PLAN OF CARE
Problem: Patient Care Overview (Adult)  Goal: Plan of Care Review  Outcome: Ongoing (interventions implemented as appropriate)    08/25/17 0443   Coping/Psychosocial Response Interventions   Plan Of Care Reviewed With patient   Patient Care Overview   Progress improving   Outcome Evaluation   Outcome Summary/Follow up Plan patient pain more controlled today. up with 1 assist. possible dc today to rehab. lower part of incision draining, drsg changed.         Problem: Pain, Chronic (Adult)  Goal: Acceptable Pain Control/Comfort Level  Outcome: Ongoing (interventions implemented as appropriate)

## 2017-08-25 NOTE — THERAPY TREATMENT NOTE
Acute Care - Physical Therapy Treatment Note  [facility]     Patient Name: Regan Cavazos  : 1945  MRN: [id number]  Today's Date: 2017  Onset of Illness/Injury or Date of Surgery Date: 17  Date of Referral to PT: 17  Referring Physician: John    Admit Date: 2017    Visit Dx:    ICD-10-CM ICD-9-CM   1. Generalized weakness R53.1 780.79   2. Spinal stenosis, lumbar region M48.06 724.02   3. Spinal stenosis of lumbar region M48.06 724.02     Patient Active Problem List   Diagnosis   • Spinal stenosis, lumbar region   • Spinal stenosis of lumbar region   • Postoperative anemia due to acute blood loss   • Iron deficiency anemia               Adult Rehabilitation Note       17 0900 17 1100 17 1600    Rehab Assessment/Intervention    Discipline physical therapist  - physical therapist  - physical therapist  -    Document Type therapy note (daily note)  - therapy note (daily note)  - therapy note (daily note)  -    Subjective Information agree to therapy  - agree to therapy  - agree to therapy;complains of;pain  -    Patient Effort, Rehab Treatment good  - good  -     Precautions/Limitations brace on when up;spinal precautions  - brace on when up;fall precautions;spinal precautions  - fall precautions;spinal precautions;brace on when up  -    Specific Treatment Considerations leg length discrep, has inserts  -LH      Recorded by [] Jenny Daniel, PT [] Jenny Daniel, PT [] Jenny Daniel PT    Pain Assessment    Pain Assessment 0-10  - No/denies pain   premedicated for tx  - No/denies pain  -    Pain Score 4  -      Post Pain Score 4  -      Pain Type Acute pain  -      Pain Location Back  -      Pain Intervention(s) Repositioned;Ambulation/increased activity  -      Response to Interventions gloria  -LH      Recorded by [] Jenny Daniel PT [] Jenny Daniel, PT [] Jenny Daniel PT    Vision Assessment/Intervention    Visual  Impairment WNL  -LH WNL  -LH WFL  -LH    Recorded by [] Jenny Daniel, PT [] Jenny Daniel, PT [] Jenny Daniel, PT    Cognitive Assessment/Intervention    Current Cognitive/Communication Assessment functional  -LH functional  -LH functional  -LH    Orientation Status oriented x 4  -LH oriented x 4  -LH oriented x 4  -LH    Follows Commands/Answers Questions 100% of the time  -% of the time  -% of the time  -LH    Recorded by [] Jenny Daniel, PT [] Jenny Daniel, PT [] Jenny Daniel, PT    Bed Mobility, Assessment/Treatment    Bed Mob, Supine to Sit, [place] not tested  - supervision required  - minimum assist (75% patient effort)  -    Bed Mob, Sit to Supine, [place] not tested  -LH not tested  - not tested  -    Bed Mobility, Comment seated EOB  -LH      Recorded by [] Jenny Daniel, PT [] Jenny Daniel, PT [] Jenny Daniel, PT    Transfer Assessment/Treatment    Transfers, Sit-Stand [place] stand by assist;verbal cues required  - stand by assist;contact guard assist  - contact guard assist;verbal cues required  -    Transfers, Stand-Sit [place] stand by assist;verbal cues required  - stand by assist;contact guard assist;verbal cues required   constant VCs for reaching back  - contact guard assist;verbal cues required  -    Transfers, Sit-Stand-Sit, Assist Device   rolling walker  -    Transfer, Comment max Vcs for pushing up from bed and reaching back to chair  -  pt req Vcs for proper technqiue in reaching back/pushing up  -LH    Recorded by [] Jenny Daniel, PT [] Jenny Daniel, PT [] Jenny Daniel, PT    Gait Assessment/Treatment    Gait, [place] Level stand by assist  - contact guard assist  - contact guard assist;verbal cues required  -    Gait, Assistive Device rolling walker  - rolling walker  - rolling walker  -    Gait, Distance (Feet) 160  -  -  -LH    Gait, Gait Pattern Analysis swing-through gait  -  swing-through gait  - swing-through gait  -    Gait, Gait Deviations bilateral:;decreased heel strike  -      Gait, Safety Issues   sequencing ability decreased;step length decreased  -    Gait, Impairments pain  -  pain  -    Gait, Comment shoes donned  - improved mechanics w. shoes donned, leg length discprency  - leg length discrepancy  -    Recorded by [] Jenny Daniel PT [] Jenny Daniel, PT [] Jenny Daniel, PT    Stairs Assessment/Treatment    Number of Stairs   1  -    Stairs, Handrail Location   left side (ascending)  -    Stairs, [place] Level   contact guard assist;verbal cues required  -    Stairs, Technique Used   step to step (ascending);step to step (descending)  -    Recorded by   [] Jenny Daniel PT    Positioning and Restraints    Pre-Treatment Position in bed  - in bed  - in bed  -    Post Treatment Position chair  - chair  - chair  -    In Chair sitting;call light within reach;encouraged to call for assist;notified nsg  - reclined;call light within reach;encouraged to call for assist;notified nsg;exit alarm on  - sitting;call light within reach;encouraged to call for assist;exit alarm on;with family/caregiver  -    Recorded by [] Jenny Daniel, PT [] Jenny Daniel, PT [] Jenny Daniel, PT      08/23/17 1000 08/22/17 1600       Rehab Assessment/Intervention    Discipline physical therapist  - physical therapy assistant  -     Document Type therapy note (daily note)  - therapy note (daily note)  -     Subjective Information agree to therapy;complains of;pain  - agree to therapy;complains of;pain  -RW     Patient Effort, Rehab Treatment good  - good  -RW     Precautions/Limitations fall precautions;spinal precautions;brace on when up  - brace on when up;fall precautions;spinal precautions  -RW     Recorded by [] Jenny Daniel, PT [RW] Ahsley Teague PTA     Pain Assessment    Pain Assessment 0-10  - 0-10  -RW     Pain Score  "--   not rated, states LBP and \"on alot of meds\"  - 5  -RW     Pain Type Acute pain  -LH Acute pain;Surgical pain  -RW     Pain Location Back  -LH Back  -RW     Pain Orientation  Lower  -RW     Pain Intervention(s) Repositioned  - Medication (See MAR);Repositioned;Ambulation/increased activity  -RW     Response to Interventions gloria  -LH tolerated  -RW     Recorded by [LH] Jenny Daniel, PT [RW] Ashley Teague PTA     Vision Assessment/Intervention    Visual Impairment WNL  -LH      Recorded by [LH] Jenny Daniel, PT      Cognitive Assessment/Intervention    Current Cognitive/Communication Assessment functional  -LH functional  -RW     Orientation Status  oriented x 4  -RW     Follows Commands/Answers Questions  100% of the time  -RW     Personal Safety  WNL/WFL  -RW     Personal Safety Interventions  fall prevention program maintained;gait belt;nonskid shoes/slippers when out of bed  -RW     Recorded by [LH] Jenny Daniel, PT [RW] Ashley Teague PTA     Bed Mobility, Assessment/Treatment    Bed Mob, Supine to Sit, [place]  not tested  -RW     Bed Mob, Sit to Supine, [place]  minimum assist (75% patient effort);verbal cues required   assist w/ BLE  -RW     Bed Mobility, Comment  via log roll  -RW     Recorded by  [RW] Ashley Teague PTA     Transfer Assessment/Treatment    Transfers, Sit-Stand [place]  contact guard assist  -RW     Transfers, Stand-Sit [place]  contact guard assist  -RW     Transfers, Sit-Stand-Sit, Assist Device  rolling walker  -RW     Recorded by  [RW] Ashley Teague PTA     Gait Assessment/Treatment    Gait, [place] Level  contact guard assist;1 person + 1 person to manage equipment;verbal cues required  -RW     Gait, Assistive Device  rolling walker  -RW     Gait, Distance (Feet)  150  -RW     Gait, Gait Pattern Analysis  swing-through gait  -RW     Gait, Gait Deviations  forward flexed posture;left:;antalgic;bilateral:;jimbo decreased  -RW     Recorded by  " [RW] Ashley Teague PTA     Balance Skills Training    Standing-Level of Assistance  Contact guard  -RW     Static Standing Balance Support  assistive device  -RW     Standing Balance # of Minutes  1   stood in restroom   -RW     Recorded by  [RW] Ashley Teague PTA     Positioning and Restraints    Pre-Treatment Position  sitting in chair/recliner  -RW     Post Treatment Position  bed  -RW     In Bed  fowlers;call light within reach;encouraged to call for assist;with family/caregiver  -RW     Recorded by  [RW] Ashley Teague PTA       User Key  (r) = Recorded By, (t) = Taken By, (c) = Cosigned By    Initials Name Effective Dates     Jenny Daniel, PT 02/07/17 -     RW Ashley Teague PTA 04/06/16 -                 IP PT Goals       08/22/17 1107          Bed Mobility PT LTG    Bed Mobility PT LTG, Date Established 08/22/17  -      Bed Mobility PT LTG, Time to Achieve 1 wk  -      Bed Mobility PT LTG, Activity Type all bed mobility  -      Bed Mobility PT LTG, Bertie Level supervision required  -      Transfer Training PT LTG    Transfer Training PT LTG, Date Established 08/22/17  -      Transfer Training PT LTG, Time to Achieve 1 wk  -      Transfer Training PT LTG, Activity Type all transfers  -      Transfer Training PT LTG, [place] Level supervision required  -      Gait Training PT LTG    Gait Training Goal PT LTG, Date Established 08/22/17  -      Gait Training Goal PT LTG, Time to Achieve 1 wk  -      Gait Training Goal PT LTG, [place]ie Level supervision required  -      Gait Training Goal PT LTG, Assist Device walker, rolling  -      Gait Training Goal PT LTG, Distance to Achieve 250  -LH        User Key  (r) = Recorded By, (t) = Taken By, (c) = Cosigned By    Initials Name Provider Type     Jenny Daniel PT Physical Therapist          Physical Therapy Education     Title: PT OT SLP Therapies (Active)     Topic: Physical Therapy (Active)     Point: Mobility  training (Active)    Learning Progress Summary    Learner Readiness Method Response Comment Documented by Status   Patient Acceptance E VU,NR   08/25/17 0910 Done    Acceptance E [facility] 08/24/17 1104 Active    Acceptance E [facility] 08/23/17 1610 Active    Acceptance E [facility] 08/22/17 1107 Active   Family Acceptance E [facility] 08/23/17 1610 Active               Point: Home exercise program (Active)    Learning Progress Summary    Learner Readiness Method Response Comment Documented by Status   Patient Acceptance E VU,NR   08/25/17 0910 Done    Acceptance E [facility] 08/24/17 1104 Active    Acceptance E [facility] 08/23/17 1610 Active    Acceptance E [facility] 08/22/17 1107 Active   Family Acceptance E [facility] 08/23/17 1610 Active               Point: Body mechanics (Done)    Learning Progress Summary    Learner Readiness Method Response Comment Documented by Status   Patient Acceptance E VU,NR   08/25/17 0910 Done    Acceptance E [facility] 08/24/17 1104 Active    Acceptance E [facility] 08/22/17 1107 Active               Point: Precautions (Done)    Learning Progress Summary    Learner Readiness Method Response Comment Documented by Status   Patient Acceptance E VU,NR   08/25/17 0910 Done    Acceptance E [facility] 08/24/17 1104 Active    Acceptance E [facility] 08/22/17 1107 Active                      User Key     Initials Effective Dates Name Provider Type Discipline     02/07/17 -  Jenny Daniel, PT Physical Therapist PT                    PT Recommendation and Plan  Anticipated Discharge Disposition: home with assist  Planned Therapy Interventions: balance training, bed mobility training, gait training, home exercise program, ROM (Range of Motion), stair training, strengthening, stretching, transfer training  PT Frequency: daily  Plan of Care Review  Plan Of Care Reviewed With: patient  Outcome Summary/Follow up Plan: good tolerance to household distances SBA rwx. recommend home with assist - pt to call friend to see about borrowing  rwx.           Outcome Measures       08/25/17 0900 08/24/17 1100 08/23/17 1600    How much help from another person do you currently need...    Turning from your back to your side while in flat bed without using bedrails? 3  -LH 3  -LH 3  -LH    Moving from lying on back to sitting on the side of a flat bed without bedrails? 3  -LH 3  -LH 3  -LH    Moving to and from a bed to a chair (including a wheelchair)? 3  -LH 3  -LH 3  -LH    Standing up from a chair using your arms (e.g., wheelchair, bedside chair)? 3  -LH 3  -LH 3  -LH    Climbing 3-5 steps with a railing? 3  -LH 3  -LH 3  -LH    To walk in hospital room? 3  -LH 3  -LH 3  -LH    AM-PAC 6 Clicks Score 18  -LH 18  -LH 18  -    Functional Assessment    Outcome Measure Options AM-PAC 6 Clicks Basic Mobility (PT)  - AM-PAC 6 Clicks Basic Mobility (PT)  - AM-PAC 6 Clicks Basic Mobility (PT)  -      08/22/17 1100          How much help from another person do you currently need...    Turning from your back to your side while in flat bed without using bedrails? 3  -LH      Moving from lying on back to sitting on the side of a flat bed without bedrails? 3  -LH      Moving to and from a bed to a chair (including a wheelchair)? 3  -LH      Standing up from a chair using your arms (e.g., wheelchair, bedside chair)? 3  -LH      Climbing 3-5 steps with a railing? 2  -LH      To walk in hospital room? 3  -      AM-PAC 6 Clicks Score 17  -      Functional Assessment    Outcome Measure Options AM-PAC 6 Clicks Basic Mobility (PT)  -        User Key  (r) = Recorded By, (t) = Taken By, (c) = Cosigned By    Initials Name Provider Type     Jenny Daniel PT Physical Therapist           Time Calculation:         PT Charges       08/25/17 0911          Time Calculation    Start Time 0900  -      Stop Time 0911  -      Time Calculation (min) 11 min  -      PT Received On 08/25/17  -      PT - Next Appointment 08/26/17  -        User Key  (r) = Recorded By,  (t) = Taken By, (c) = Cosigned By    Initials Name Provider Type     Jenny Daniel, PT Physical Therapist          Therapy Charges for Today     Code Description Service Date Service Provider Modifiers Qty    95968637044 HC PT THER SUPP EA 15 MIN 8/24/2017 Jenny Daniel, PT GP 1    66698000280 HC PT THER PROC EA 15 MIN 8/24/2017 Jenny Daniel, PT GP 1    85486969298 HC PT THER SUPP EA 15 MIN 8/25/2017 Jenny Daniel, PT GP 1    31405139953 HC PT THER PROC EA 15 MIN 8/25/2017 Jenny Daniel, PT GP 1          PT G-Codes  Outcome Measure Options: AM-PAC 6 Clicks Basic Mobility (PT)    Jenny Daniel, PT  8/25/2017

## 2017-08-25 NOTE — TELEPHONE ENCOUNTER
Spoke with CCP department at [facility].  [facility] is [facility] next [facility].  She is contacted several agencies including [facility] to see if they can see the patient.  Unfortunately he does not have any insurance is and is only covered by the [facility] therefore [facility] cannot see him.  She is contacted [facility] however the manager is not in until in the morning.  She is faxed all the information to [facility] and does seem to think that they will be able to see the patient tomorrow.  I have contacted the patient and spoke with him and his fiancée and explained the situation to them but if they do not hear from any home health agency and she's continued to have drainage from his incision or becomes febrile then she needs to take him to the emergency room.

## 2017-08-25 NOTE — PLAN OF CARE
Problem: Patient Care Overview (Adult)  Goal: Plan of Care Review    08/25/17 0910   Outcome Evaluation   Outcome Summary/Follow up Plan good tolerance to household distances SBA rwx. recommend home with assist - pt to call friend to see about borrowing rwx.

## 2017-08-25 NOTE — PROGRESS NOTES
Continued Stay Note  [facility]     Patient Name: Regan Cavazos  MRN: [id number]  Today's Date: 8/25/2017    Admit Date: 8/21/2017          Discharge Plan       08/25/17 0947    Case Management/Social Work Plan    Plan Home; girlfriend to assist    Additional Comments Met with patient to discuss walker.  He is not sure he wants one.  Informed patient would fax order to [facility] and he could have someone  the walker [facility].  He denied need for 3:1 commode.  Informed [facility] that [facility] as confirmed this week through [facility] admissions nurse Bill [phone].    Final Note    Final Note Home with assist of girlfriend.              Discharge Codes       08/25/17 0949    Discharge Codes    Discharge Codes 01  Discharge to home            Leeanna Alejandre RN

## 2017-08-26 LAB — BACTERIA SPEC ANAEROBE CULT: NORMAL

## 2017-08-26 NOTE — PROGRESS NOTES
Continued Stay Note  [facility]     Patient Name: Regan Cavazos  MRN: [id number]  Today's Date: 8/26/2017    Admit Date: 8/21/2017          Discharge Plan       08/26/17 0939    Case Management/Social Work Plan    Additional Comments Called and s/w Ana with [facility] ([phone]) who confirmed they are able to accept patient and will see him this weekend or first thing Monday morning. Advises they will bill Medicare A for services.................BENI Wood, CCP              Discharge Codes     None        Expected Discharge Date and Time     Expected Discharge Date Expected Discharge Time    Aug 25, 2017             Jones De Dios RN

## 2017-08-28 ENCOUNTER — TELEPHONE (OUTPATIENT)
Dept: ORTHOPEDIC SURGERY | Facility: CLINIC | Age: 72
End: 2017-08-28
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
## 2017-08-28 NOTE — TELEPHONE ENCOUNTER
Spoke with both the patient and his fiancée.  He has been taking some over-the-counter mild laxatives from [facility] with no relief.  Have recommended that she either try a fleets enema or some citrate of magnesia in order to get him cleaned out.  He has been cleaned out would recommend that he take an over-the-counter stool softeners such as Colace or Senokot twice a day every day as well as MiraLAX 17 g by mouth daily per JG W

## 2017-08-30 ENCOUNTER — TELEPHONE (OUTPATIENT)
Dept: ORTHOPEDIC SURGERY | Facility: CLINIC | Age: 72
End: 2017-08-30

## 2017-08-30 NOTE — TELEPHONE ENCOUNTER
I have instructed the patient to come in on Friday, September 1 at 10 AM.  Would you please add him to Dr. Lopez schedule

## 2017-08-30 NOTE — TELEPHONE ENCOUNTER
Call return to the patient's girish.  I was unable to reach her but it did leave a message on her answering machine in the meantime I did try the patient's cell phone and was able to get in touch with both of them.  He has remained afebrile however he still having a moderate amount of back pain although in talking to him he is not taking his pain medicine as regularly as he should because of the increased problems that he has with constipation.  Patient states that the pain medicine does help his pain but he is more concerned about being constipated.  Patient has had 2 BM since being home.  I explained to the patient that while he is in active and on narcotics he more than likely may not have a bowel movement on a daily basis.  Have recommended that he take his over-the-counter stool softener twice a day every day and MiraLAX twice a day while he's on the narcotics.  Plain to him that he would progress further with his ambulation if he is pain was in better control.  Patient also was concerned that he still having a small amount of serous drainage from the distal end of his incision.  They have continued to paint the incision twice a day with Betadine and apply dry dressing.  Have made him an appointment to see DORITA FOSTER on Friday for wound check and follow-up per DORITA FOSTER

## 2017-09-01 ENCOUNTER — OFFICE VISIT (OUTPATIENT)
Dept: ORTHOPEDIC SURGERY | Facility: CLINIC | Age: 72
End: 2017-09-01

## 2017-09-01 VITALS — TEMPERATURE: 97.5 F | HEIGHT: 73 IN | BODY MASS INDEX: 26.51 KG/M2 | WEIGHT: 200 LBS

## 2017-09-01 DIAGNOSIS — R53.1 GENERALIZED WEAKNESS: ICD-10-CM

## 2017-09-01 DIAGNOSIS — M48.061 SPINAL STENOSIS, LUMBAR REGION: ICD-10-CM

## 2017-09-01 PROCEDURE — 99024 POSTOP FOLLOW-UP VISIT: CPT | Performed by: ORTHOPAEDIC SURGERY

## 2017-09-01 RX ORDER — CEPHALEXIN 500 MG/1
500 CAPSULE ORAL EVERY 6 HOURS
Qty: 20 CAPSULE | Refills: 0 | Status: SHIPPED | OUTPATIENT
Start: 2017-09-01 | End: 2021-04-14 | Stop reason: HOSPADM

## 2017-09-01 RX ORDER — OXYCODONE HYDROCHLORIDE AND ACETAMINOPHEN 5; 325 MG/1; MG/1
TABLET ORAL
Qty: 50 TABLET | Refills: 0 | Status: SHIPPED | OUTPATIENT
Start: 2017-09-01 | End: 2017-09-13 | Stop reason: SDUPTHER

## 2017-09-01 RX ORDER — SENNA AND DOCUSATE SODIUM 50; 8.6 MG/1; MG/1
1 TABLET, FILM COATED ORAL 2 TIMES DAILY
Qty: 60 TABLET | Refills: 0 | Status: SHIPPED | OUTPATIENT
Start: 2017-09-01

## 2017-09-01 NOTE — PROGRESS NOTES
He is 10 days out from revision decompression and fusion and doing well except for some drainage from the wound.  No fevers his leg pains gone the back pain is tolerable.  He is afebrile.  The wound is closed except for a tiny area at the caudal aspect from which the drainage the drips.  It serosanguineous.  He has significant fluctuance but no redness no induration.  Good strength in the lower extremities bilaterally.  Her sterile technique I aspirated 60 cc of the dark serosanguineous fluid consistent with the old blood.  I did not send this for culture as there is simply no evidence of infection and he is not particularly prone to this.  He'll take Senokot for constipation, Keflex daily for 5 days and I refilled Percocet.  If the drainage stops then he does not need to follow up until 6 weeks.  If it persist then I wish to see him next Tuesday.  We need x-rays at the next visit.

## 2017-09-05 ENCOUNTER — TELEPHONE (OUTPATIENT)
Dept: ORTHOPEDIC SURGERY | Facility: CLINIC | Age: 72
End: 2017-09-05

## 2017-09-05 NOTE — TELEPHONE ENCOUNTER
See if you can talk him through this.  Options include switching to Tylenol for pain relief, taking bran cereal Senokot Dulcolax and even home enemas.  He might also see his family physician

## 2017-09-05 NOTE — TELEPHONE ENCOUNTER
"Call return to the patient.  When that I am not aware of any medication for pain that he can take the does not have a side effect of constipation.  Him have recommended that he continue taking the stool softeners twice a day and the MiraLAX twice a day which she's not sure he's been doing and should increase his by mouth fluid intake as well as more fruits and vegetables and Solomon in his diet.  He can take over-the-counter laxatives if need be.  The only other option the patient has is to discontinue the narcotics and to take Tylenol for pain.  I've also recommended that if the constipation becomes too big of a problem that he needs to contact his PCP.  Patient states that he is going to the bathroom but \" it's not every day which is what he is accustomed to doing before his surgery.\"  "

## 2017-09-07 ENCOUNTER — TELEPHONE (OUTPATIENT)
Dept: ORTHOPEDIC SURGERY | Facility: CLINIC | Age: 72
End: 2017-09-07

## 2017-09-08 NOTE — TELEPHONE ENCOUNTER
Call return to the patient.  He is planning of back pain but no leg pain.  The pain is usually worse when he gets up first thing in the morning and is now only taking pain medicine about twice a day.  I he can only walk for short distances before he develops back pain.  Into the patient at great length he has not been taking his pain medicine very often because of the increased probably had with constipation.  Patient also has not been walking very much throughout the day.  Advised him to try taking his pain medicine fairly regularly through the day for the next few days and would also recommend that he increase his walking to mouth twice a day increasing his distance on a daily basis.  I've explained to him that not doing much timidly and only sitting causes stiffness and increased discomfort.  She has agreed to try the pain medicine even though he is concerned about constipation.  He's also agreed to increase his activity however if his symptoms do not improve have ask him to contact the office the first of the week and he will need to make an appointment to see DORITA FOSTER and follow-up per DORITA FOSTER

## 2017-09-12 ENCOUNTER — TELEPHONE (OUTPATIENT)
Dept: ORTHOPEDIC SURGERY | Facility: CLINIC | Age: 72
End: 2017-09-12

## 2017-09-12 NOTE — TELEPHONE ENCOUNTER
Call return to the patient.  He is having the same exact pain and intensity that he had preoperatively.  Patient is not getting much relief with his narcotics.  He has increased his activity.  Denies any leg pain or numbness and tingling.  Patient is scheduled to see Dr. ruggiero tomorrow for follow-up and have advised him that he will discuss options at that time

## 2017-09-13 ENCOUNTER — TELEPHONE (OUTPATIENT)
Dept: ORTHOPEDIC SURGERY | Facility: CLINIC | Age: 72
End: 2017-09-13

## 2017-09-13 ENCOUNTER — OFFICE VISIT (OUTPATIENT)
Dept: ORTHOPEDIC SURGERY | Facility: CLINIC | Age: 72
End: 2017-09-13

## 2017-09-13 DIAGNOSIS — R53.1 GENERALIZED WEAKNESS: ICD-10-CM

## 2017-09-13 DIAGNOSIS — M48.061 SPINAL STENOSIS, LUMBAR REGION: ICD-10-CM

## 2017-09-13 DIAGNOSIS — Z98.1 S/P LUMBAR FUSION: Primary | ICD-10-CM

## 2017-09-13 PROCEDURE — 72100 X-RAY EXAM L-S SPINE 2/3 VWS: CPT | Performed by: ORTHOPAEDIC SURGERY

## 2017-09-13 PROCEDURE — 99024 POSTOP FOLLOW-UP VISIT: CPT | Performed by: ORTHOPAEDIC SURGERY

## 2017-09-13 RX ORDER — OXYCODONE HYDROCHLORIDE AND ACETAMINOPHEN 5; 325 MG/1; MG/1
TABLET ORAL
Qty: 50 TABLET | Refills: 0 | Status: SHIPPED | OUTPATIENT
Start: 2017-09-13 | End: 2021-04-14 | Stop reason: HOSPADM

## 2017-09-13 NOTE — PROGRESS NOTES
He is 3 weeks out and complaining of lumbar back pain still some left leg pain.  He has no fever no wound drainage.  The wound is pristine no fluctuance or induration.  Two-view x-rays of lumbar spine show excellent position of the graft and implants unchanged from prior films.  Overall doing well I tried to reassure him.  I've refilled pain meds all see him back in a month.  He may return to work when he is able.

## 2017-09-14 ENCOUNTER — TELEPHONE (OUTPATIENT)
Dept: ORTHOPEDIC SURGERY | Facility: CLINIC | Age: 72
End: 2017-09-14

## 2017-09-26 ENCOUNTER — OFFICE VISIT (OUTPATIENT)
Dept: ORTHOPEDIC SURGERY | Facility: CLINIC | Age: 72
End: 2017-09-26

## 2017-09-26 VITALS — TEMPERATURE: 97.4 F | HEIGHT: 70 IN | BODY MASS INDEX: 28.83 KG/M2 | WEIGHT: 201.4 LBS

## 2017-09-26 DIAGNOSIS — Z98.1 S/P LAMINECTOMY WITH SPINAL FUSION: Primary | ICD-10-CM

## 2017-09-26 PROCEDURE — 99024 POSTOP FOLLOW-UP VISIT: CPT | Performed by: ORTHOPAEDIC SURGERY

## 2017-09-26 PROCEDURE — 72100 X-RAY EXAM L-S SPINE 2/3 VWS: CPT | Performed by: ORTHOPAEDIC SURGERY

## 2017-09-26 NOTE — PROGRESS NOTES
Back pain is not much better yet.  No significant leg pain.  Two-view x-rays of the lumbar spine obtained to evaluate hardware and  fusion bone suggest further healing since prior x-ray.  We will need an AP and lateral lumbar x-ray on return visit.  Instructions were given.

## 2017-10-03 ENCOUNTER — TELEPHONE (OUTPATIENT)
Dept: ORTHOPEDIC SURGERY | Facility: CLINIC | Age: 72
End: 2017-10-03

## 2017-10-03 NOTE — TELEPHONE ENCOUNTER
Home health PT called stating they admitted patient on 8/30 but only saw patient a few times due to unavailability (could not reach patient or he cancelled). They discharged him and now patient called them wanting to be readmitted. If JCHEYANNEW approves, only need VERBAL order.

## 2017-10-09 ENCOUNTER — TELEPHONE (OUTPATIENT)
Dept: ORTHOPEDIC SURGERY | Facility: CLINIC | Age: 72
End: 2017-10-09

## 2017-10-11 ENCOUNTER — OFFICE VISIT (OUTPATIENT)
Dept: ORTHOPEDIC SURGERY | Facility: CLINIC | Age: 72
End: 2017-10-11

## 2017-10-11 VITALS — BODY MASS INDEX: 26.51 KG/M2 | WEIGHT: 200 LBS | HEIGHT: 73 IN | TEMPERATURE: 97.4 F

## 2017-10-11 DIAGNOSIS — Z98.1 HISTORY OF LUMBAR FUSION: Primary | ICD-10-CM

## 2017-10-11 PROCEDURE — 99024 POSTOP FOLLOW-UP VISIT: CPT | Performed by: ORTHOPAEDIC SURGERY

## 2017-10-11 PROCEDURE — 72100 X-RAY EXAM L-S SPINE 2/3 VWS: CPT | Performed by: ORTHOPAEDIC SURGERY

## 2017-10-11 NOTE — PROGRESS NOTES
Back pain persist.  Some left leg pain.  Two-view x-rays of the lumbar spine obtained to evaluate hardware and  fusion bone suggest further healing since prior x-ray.  We will need an AP and lateral lumbar x-ray on return visit.  Instructions were given.

## 2017-10-17 ENCOUNTER — TELEPHONE (OUTPATIENT)
Dept: ORTHOPEDIC SURGERY | Facility: CLINIC | Age: 72
End: 2017-10-17

## 2017-11-09 ENCOUNTER — TELEPHONE (OUTPATIENT)
Dept: ORTHOPEDIC SURGERY | Facility: CLINIC | Age: 72
End: 2017-11-09

## 2017-11-10 NOTE — TELEPHONE ENCOUNTER
SPOKE WITH TIAGO AT [facility]  SHE WAS JUST INFORMING YOU THAT HE HAS NOT STARTED PT YET. THAT WAS IT.

## 2018-01-17 ENCOUNTER — TELEPHONE (OUTPATIENT)
Dept: ORTHOPEDIC SURGERY | Facility: CLINIC | Age: 73
End: 2018-01-17

## 2018-01-23 NOTE — TELEPHONE ENCOUNTER
I spoke with Dr. QUEZADA.    Patient is basically added me to the list of surgeons with which she is unhappy Bernarda Camejo, may hand and now me.  My notes are replete with warnings and the cautions about entering into another surgery and expectations for this I want be repeated here.  Suffice to say that I reassured Dr. rosado, that things seem to be going well in the technical note, that x-rays were fine and the wound appeared to be healing.  I agree to physical therapy on a formal basis would be an excellent idea

## 2018-05-09 ENCOUNTER — TELEPHONE (OUTPATIENT)
Dept: ORTHOPEDIC SURGERY | Facility: CLINIC | Age: 73
End: 2018-05-09

## 2019-09-16 ENCOUNTER — TELEPHONE (OUTPATIENT)
Dept: ORTHOPEDIC SURGERY | Facility: CLINIC | Age: 74
End: 2019-09-16

## 2019-09-16 NOTE — TELEPHONE ENCOUNTER
Call returned to the patient.  I was unable to reach him but I did leave a message on his answering machine to contact the office

## 2020-12-17 ENCOUNTER — OFFICE VISIT (OUTPATIENT)
Dept: ORTHOPEDIC SURGERY | Facility: CLINIC | Age: 75
End: 2020-12-17

## 2020-12-17 VITALS — WEIGHT: 210 LBS | BODY MASS INDEX: 27.83 KG/M2 | HEIGHT: 73 IN | TEMPERATURE: 98 F

## 2020-12-17 DIAGNOSIS — M25.551 RIGHT HIP PAIN: Primary | ICD-10-CM

## 2020-12-17 PROCEDURE — 99203 OFFICE O/P NEW LOW 30 MIN: CPT | Performed by: ORTHOPAEDIC SURGERY

## 2020-12-17 PROCEDURE — 73502 X-RAY EXAM HIP UNI 2-3 VIEWS: CPT | Performed by: ORTHOPAEDIC SURGERY

## 2020-12-17 NOTE — PROGRESS NOTES
Patient: Regan Cavazos  YOB: 1945 75 y.o. male  Medical Record Number: [id number]    Chief Complaints:   Chief Complaint   Patient presents with   • Right Hip - Pain       History of Present Illness:Regan Cavazos is a 75 y.o. male who presents with complaints of right posterior hip pain.  He describes a constant aching pain with any activity.  He had a previous total hip replacement and then I performed polyhead change to try and equalize leg lengths we are still able only to gain some of his leg length discrepancy.  He uses a heel lift to equalize leg lengths but still feels that his posterior hip pain is due to the leg length discrepancy.    Allergies:   Allergies   Allergen Reactions   • Morphine Other (See Comments)     HALLUCINATIONS AND ACTED OUT   • Cetirizine Other (See Comments)     drowsiness   • Moxifloxacin Other (See Comments)     Behavior changes       Medications:   Current Outpatient Medications   Medication Sig Dispense Refill   • carBAMazepine (TEGretol) 200 MG tablet Take 100 mg by mouth 2 (Two) Times a Day.     • ferrous sulfate 325 (65 FE) MG tablet Take 1 tablet by mouth 2 (Two) Times a Day With Meals. 60 tablet 0   • fexofenadine (ALLEGRA) 180 MG tablet Take 180 mg by mouth Daily.     • modafinil (PROVIGIL) 200 MG tablet Take 200 mg by mouth As Needed.     • oxyCODONE-acetaminophen (PERCOCET) 5-325 MG per tablet Take 1-2 tabs po q 4 hours prn pain 50 tablet 0   • pantoprazole (PROTONIX) 20 MG EC tablet Take 40 mg by mouth Daily.     • polyethylene glycol (MIRALAX) packet Take 17 g by mouth Daily. 225 g 0   • sennosides-docusate sodium (SENOKOT-S) 8.6-50 MG tablet Take 1 tablet by mouth 2 (Two) Times a Day. 60 tablet 0   • simvastatin (ZOCOR) 40 MG tablet Take 40 mg by mouth Every Night.     • terazosin (HYTRIN) 10 MG capsule Take 10 mg by mouth Every Night.     • vitamin B-12 (CYANOCOBALAMIN) 500 MCG tablet Take 500 mcg by mouth Daily.     • cephalexin (KEFLEX) 500 MG capsule  "Take 1 capsule by mouth Every 6 (Six) Hours. 20 capsule 0   • clopidogrel (PLAVIX) 75 MG tablet Take 75 mg by mouth Daily. PT TO STOP PER MD INSTRUCTION 5 DAYS PRIOR TO SURGERY PER CARDIOLOGY     • Doxycycline Hyclate 50 MG tablet delayed-release enteric coated tablet Take 50 mg by mouth Daily.       No current facility-administered medications for this visit.          The following portions of the patient's history were reviewed and updated as appropriate: allergies, current medications, past family history, past medical history, past social history, past surgical history and problem list.    Review of Systems:   A 14 point review of systems was performed. All systems negative except pertinent positives/negative listed in HPI above    Physical Exam:   Vitals:    12/17/20 1548   Temp: 98 °F (36.7 °C)   TempSrc: Oral   Weight: 95.3 kg (210 lb)   Height: 185.4 cm (73\")   PainSc:   8       General: A and O x 3, ASA, NAD    SCLERA:    Normal    DENTITION:   Normal  Hip:  right    LEG ALIGNMENT:     Neutral   ,    1/4 in LLD L > R    GAIT:     Slightly antalgic    SKIN:     No abnormality    RANGE OF MOTION:      Full without joint irritability    STRENGTH:     4 / 5    hip flexion and abduction    DISTAL PULSES:    Paplable    DISTAL SENSATION :   Intact    LYMPHATICS:     No   lymphadenopathy    OTHER:          - Negative Stinchfeld test      - Negative log roll      - No Tenderness to palpation trochanteric bursa      - Neg FADIR      - Neg KRISTAN      - No SI tenderness        Radiology:  Xrays 2viewsright hip  (AP bilateral hips, and lateral of the hip) ordered and reviewed for evaluation of hip pain  demonstrating  a well positioned total hip without evidence of wear, loosening, or osteoarthritis. There is 1 cm shortening right leg.  In comparison to previous films there is no change    Assessment/Plan: Right LLD -  Pain is primarily lumbar / SI , I have recommended against any hip revision as it could lead to a " worse situation.  I recommend he continues to heal left and work on measures to control back pain with his spine specialist.  I will see him back as needed.      Ramesh Drew MD  12/17/2020

## 2021-04-03 ENCOUNTER — APPOINTMENT (OUTPATIENT)
Dept: GENERAL RADIOLOGY | Facility: HOSPITAL | Age: 76
End: 2021-04-03

## 2021-04-03 ENCOUNTER — HOSPITAL ENCOUNTER (INPATIENT)
Facility: HOSPITAL | Age: 76
LOS: 11 days | Discharge: NURSING, REHAB OR LONG-TERM CARE | End: 2021-04-14
Attending: HOSPITALIST | Admitting: STUDENT IN AN ORGANIZED HEALTH CARE EDUCATION/TRAINING PROGRAM

## 2021-04-03 DIAGNOSIS — S72.001A CLOSED RIGHT HIP FRACTURE, INITIAL ENCOUNTER (HCC): Primary | ICD-10-CM

## 2021-04-03 LAB
ALBUMIN SERPL-MCNC: 3.6 G/DL (ref 3.5–5.2)
ALBUMIN/GLOB SERPL: 1.8 G/DL
ALP SERPL-CCNC: 50 U/L (ref 39–117)
ALT SERPL W P-5'-P-CCNC: 10 U/L (ref 1–41)
ANION GAP SERPL CALCULATED.3IONS-SCNC: 10.9 MMOL/L (ref 5–15)
AST SERPL-CCNC: 18 U/L (ref 1–40)
BASOPHILS # BLD MANUAL: 0.07 10*3/MM3 (ref 0–0.2)
BASOPHILS NFR BLD AUTO: 1 % (ref 0–1.5)
BILIRUB SERPL-MCNC: 0.6 MG/DL (ref 0–1.2)
BUN SERPL-MCNC: 10 MG/DL (ref 8–23)
BUN/CREAT SERPL: 12.3 (ref 7–25)
CALCIUM SPEC-SCNC: 8 MG/DL (ref 8.6–10.5)
CHLORIDE SERPL-SCNC: 102 MMOL/L (ref 98–107)
CO2 SERPL-SCNC: 25.1 MMOL/L (ref 22–29)
CREAT SERPL-MCNC: 0.81 MG/DL (ref 0.76–1.27)
DEPRECATED RDW RBC AUTO: 46.3 FL (ref 37–54)
EOSINOPHIL # BLD MANUAL: 0.07 10*3/MM3 (ref 0–0.4)
EOSINOPHIL NFR BLD MANUAL: 1 % (ref 0.3–6.2)
ERYTHROCYTE [DISTWIDTH] IN BLOOD BY AUTOMATED COUNT: 11.9 % (ref 12.3–15.4)
ETHANOL BLD-MCNC: <10 MG/DL (ref 0–10)
ETHANOL UR QL: <0.01 %
FOLATE SERPL-MCNC: >20 NG/ML (ref 4.78–24.2)
GFR SERPL CREATININE-BSD FRML MDRD: 93 ML/MIN/1.73
GLOBULIN UR ELPH-MCNC: 2 GM/DL
GLUCOSE SERPL-MCNC: 75 MG/DL (ref 65–99)
HCT VFR BLD AUTO: 37.3 % (ref 37.5–51)
HGB BLD-MCNC: 12.6 G/DL (ref 13–17.7)
LYMPHOCYTES # BLD MANUAL: 0.47 10*3/MM3 (ref 0.7–3.1)
LYMPHOCYTES NFR BLD MANUAL: 5.1 % (ref 5–12)
LYMPHOCYTES NFR BLD MANUAL: 7.1 % (ref 19.6–45.3)
MACROCYTES BLD QL SMEAR: ABNORMAL
MCH RBC QN AUTO: 35.5 PG (ref 26.6–33)
MCHC RBC AUTO-ENTMCNC: 33.8 G/DL (ref 31.5–35.7)
MCV RBC AUTO: 105.1 FL (ref 79–97)
MONOCYTES # BLD AUTO: 0.34 10*3/MM3 (ref 0.1–0.9)
NEUTROPHILS # BLD AUTO: 5.65 10*3/MM3 (ref 1.7–7)
NEUTROPHILS NFR BLD MANUAL: 85.9 % (ref 42.7–76)
PLAT MORPH BLD: NORMAL
PLATELET # BLD AUTO: 174 10*3/MM3 (ref 140–450)
PMV BLD AUTO: 8.9 FL (ref 6–12)
POTASSIUM SERPL-SCNC: 4.6 MMOL/L (ref 3.5–5.2)
PROT SERPL-MCNC: 5.6 G/DL (ref 6–8.5)
QT INTERVAL: 394 MS
RBC # BLD AUTO: 3.55 10*6/MM3 (ref 4.14–5.8)
SODIUM SERPL-SCNC: 138 MMOL/L (ref 136–145)
TROPONIN T SERPL-MCNC: <0.01 NG/ML (ref 0–0.03)
VIT B12 BLD-MCNC: 272 PG/ML (ref 211–946)
WBC # BLD AUTO: 6.58 10*3/MM3 (ref 3.4–10.8)
WBC MORPH BLD: NORMAL

## 2021-04-03 PROCEDURE — 85007 BL SMEAR W/DIFF WBC COUNT: CPT | Performed by: NURSE PRACTITIONER

## 2021-04-03 PROCEDURE — 84484 ASSAY OF TROPONIN QUANT: CPT | Performed by: NURSE PRACTITIONER

## 2021-04-03 PROCEDURE — 80053 COMPREHEN METABOLIC PANEL: CPT | Performed by: NURSE PRACTITIONER

## 2021-04-03 PROCEDURE — U0004 COV-19 TEST NON-CDC HGH THRU: HCPCS | Performed by: STUDENT IN AN ORGANIZED HEALTH CARE EDUCATION/TRAINING PROGRAM

## 2021-04-03 PROCEDURE — 85025 COMPLETE CBC W/AUTO DIFF WBC: CPT | Performed by: NURSE PRACTITIONER

## 2021-04-03 PROCEDURE — 82746 ASSAY OF FOLIC ACID SERUM: CPT | Performed by: NURSE PRACTITIONER

## 2021-04-03 PROCEDURE — 25010000002 HYDROMORPHONE PER 4 MG: Performed by: NURSE PRACTITIONER

## 2021-04-03 PROCEDURE — 93005 ELECTROCARDIOGRAM TRACING: CPT | Performed by: NURSE PRACTITIONER

## 2021-04-03 PROCEDURE — 73030 X-RAY EXAM OF SHOULDER: CPT

## 2021-04-03 PROCEDURE — 71045 X-RAY EXAM CHEST 1 VIEW: CPT

## 2021-04-03 PROCEDURE — 82607 VITAMIN B-12: CPT | Performed by: NURSE PRACTITIONER

## 2021-04-03 PROCEDURE — 93010 ELECTROCARDIOGRAM REPORT: CPT | Performed by: INTERNAL MEDICINE

## 2021-04-03 PROCEDURE — 82077 ASSAY SPEC XCP UR&BREATH IA: CPT | Performed by: NURSE PRACTITIONER

## 2021-04-03 RX ORDER — CLONIDINE HYDROCHLORIDE 0.1 MG/1
0.1 TABLET ORAL EVERY 4 HOURS PRN
Status: DISCONTINUED | OUTPATIENT
Start: 2021-04-03 | End: 2021-04-14 | Stop reason: HOSPADM

## 2021-04-03 RX ORDER — CHOLECALCIFEROL (VITAMIN D3) 125 MCG
1000 CAPSULE ORAL DAILY
Status: DISCONTINUED | OUTPATIENT
Start: 2021-04-03 | End: 2021-04-14 | Stop reason: HOSPADM

## 2021-04-03 RX ORDER — FEXOFENADINE HCL 180 MG/1
180 TABLET ORAL DAILY
Status: DISCONTINUED | OUTPATIENT
Start: 2021-04-03 | End: 2021-04-14 | Stop reason: HOSPADM

## 2021-04-03 RX ORDER — TERAZOSIN 5 MG/1
10 CAPSULE ORAL NIGHTLY
Status: DISCONTINUED | OUTPATIENT
Start: 2021-04-03 | End: 2021-04-14 | Stop reason: HOSPADM

## 2021-04-03 RX ORDER — CARBAMAZEPINE 200 MG/1
400 TABLET ORAL 2 TIMES DAILY
Status: DISCONTINUED | OUTPATIENT
Start: 2021-04-03 | End: 2021-04-14 | Stop reason: HOSPADM

## 2021-04-03 RX ORDER — SODIUM CHLORIDE 0.9 % (FLUSH) 0.9 %
10 SYRINGE (ML) INJECTION AS NEEDED
Status: DISCONTINUED | OUTPATIENT
Start: 2021-04-03 | End: 2021-04-14 | Stop reason: HOSPADM

## 2021-04-03 RX ORDER — CARBAMAZEPINE 200 MG/1
100 TABLET ORAL 2 TIMES DAILY
Status: DISCONTINUED | OUTPATIENT
Start: 2021-04-03 | End: 2021-04-03

## 2021-04-03 RX ORDER — HYDROMORPHONE HYDROCHLORIDE 1 MG/ML
0.5 INJECTION, SOLUTION INTRAMUSCULAR; INTRAVENOUS; SUBCUTANEOUS
Status: DISPENSED | OUTPATIENT
Start: 2021-04-03 | End: 2021-04-10

## 2021-04-03 RX ORDER — PANTOPRAZOLE SODIUM 40 MG/1
40 TABLET, DELAYED RELEASE ORAL EVERY MORNING
Status: DISCONTINUED | OUTPATIENT
Start: 2021-04-04 | End: 2021-04-14 | Stop reason: HOSPADM

## 2021-04-03 RX ORDER — FAMOTIDINE 20 MG/1
20 TABLET, FILM COATED ORAL
Status: DISCONTINUED | OUTPATIENT
Start: 2021-04-03 | End: 2021-04-14 | Stop reason: HOSPADM

## 2021-04-03 RX ORDER — MODAFINIL 100 MG/1
200 TABLET ORAL DAILY
Status: DISPENSED | OUTPATIENT
Start: 2021-04-03 | End: 2021-04-10

## 2021-04-03 RX ORDER — DIPHENOXYLATE HYDROCHLORIDE AND ATROPINE SULFATE 2.5; .025 MG/1; MG/1
1 TABLET ORAL DAILY
Status: COMPLETED | OUTPATIENT
Start: 2021-04-04 | End: 2021-04-06

## 2021-04-03 RX ORDER — SODIUM CHLORIDE 0.9 % (FLUSH) 0.9 %
10 SYRINGE (ML) INJECTION EVERY 12 HOURS SCHEDULED
Status: DISCONTINUED | OUTPATIENT
Start: 2021-04-03 | End: 2021-04-14 | Stop reason: HOSPADM

## 2021-04-03 RX ORDER — ATORVASTATIN CALCIUM 20 MG/1
40 TABLET, FILM COATED ORAL DAILY
Status: DISCONTINUED | OUTPATIENT
Start: 2021-04-03 | End: 2021-04-14 | Stop reason: HOSPADM

## 2021-04-03 RX ORDER — ONDANSETRON 2 MG/ML
4 INJECTION INTRAMUSCULAR; INTRAVENOUS EVERY 6 HOURS PRN
Status: DISCONTINUED | OUTPATIENT
Start: 2021-04-03 | End: 2021-04-14 | Stop reason: HOSPADM

## 2021-04-03 RX ORDER — OXYCODONE AND ACETAMINOPHEN 7.5; 325 MG/1; MG/1
1 TABLET ORAL EVERY 4 HOURS PRN
Status: DISPENSED | OUTPATIENT
Start: 2021-04-03 | End: 2021-04-10

## 2021-04-03 RX ORDER — FERROUS SULFATE 325(65) MG
325 TABLET ORAL 2 TIMES DAILY WITH MEALS
Status: DISCONTINUED | OUTPATIENT
Start: 2021-04-03 | End: 2021-04-03

## 2021-04-03 RX ORDER — FOLIC ACID 1 MG/1
1 TABLET ORAL DAILY
Status: COMPLETED | OUTPATIENT
Start: 2021-04-04 | End: 2021-04-06

## 2021-04-03 RX ADMIN — HYDROMORPHONE HYDROCHLORIDE 0.5 MG: 1 INJECTION, SOLUTION INTRAMUSCULAR; INTRAVENOUS; SUBCUTANEOUS at 16:37

## 2021-04-03 RX ADMIN — Medication 1000 MCG: at 20:53

## 2021-04-03 RX ADMIN — HYDROMORPHONE HYDROCHLORIDE 0.5 MG: 1 INJECTION, SOLUTION INTRAMUSCULAR; INTRAVENOUS; SUBCUTANEOUS at 23:48

## 2021-04-03 RX ADMIN — ATORVASTATIN CALCIUM 40 MG: 20 TABLET, FILM COATED ORAL at 20:54

## 2021-04-03 RX ADMIN — Medication 100 MG: at 18:45

## 2021-04-03 RX ADMIN — SODIUM CHLORIDE, PRESERVATIVE FREE 10 ML: 5 INJECTION INTRAVENOUS at 20:58

## 2021-04-03 RX ADMIN — FAMOTIDINE 20 MG: 20 TABLET, FILM COATED ORAL at 20:54

## 2021-04-03 RX ADMIN — HYDROMORPHONE HYDROCHLORIDE 0.5 MG: 1 INJECTION, SOLUTION INTRAMUSCULAR; INTRAVENOUS; SUBCUTANEOUS at 20:52

## 2021-04-03 RX ADMIN — TERAZOSIN HYDROCHLORIDE 10 MG: 5 CAPSULE ORAL at 20:53

## 2021-04-03 RX ADMIN — CARBAMAZEPINE 400 MG: 200 TABLET ORAL at 20:54

## 2021-04-03 RX ADMIN — MODAFINIL 200 MG: 100 TABLET ORAL at 20:54

## 2021-04-03 NOTE — PLAN OF CARE
Goal Outcome Evaluation:  [facility] and [facility] of [facility]. While doing admission pt unclear to meds. Per [facility] active meds are ASA not plavix as well as carbamazepine, cholecalcif, b12, voltaren cream, pepcid, allegra,MVI, simvastatin, modanifil, protonix and terazosin.. please see med list from [facility] on top of chart.  No s/s of distress noted. Will continue to monitor and support as needed.   Patient was seen and assessed while wearing personal protective equipment including facemask, protective eyewear and gloves.  Hand hygiene performed prior to entering the room and upon exiting with doffing of gloves.

## 2021-04-03 NOTE — NURSING NOTE
08:43 EDT  Alfonso colmenares from [facility] re: pt R hip/broken prosthesis; pt of Dr. Drew. Per report transportation just contacted, no ETA at this time

## 2021-04-03 NOTE — PLAN OF CARE
Problem: Activity Intolerance  Goal: Enhanced Capacity and Energy  Intervention: Optimize Activity Tolerance  Description: Assess patient's current rating of perceived exertion; compare to previous level.  Cluster, coordinate and organize care schedule per patient preference, priorities and tolerance.  Preplan and pace activity; balance activity with periods of rest; allow for uninterrupted sleep.  Support coping and manage anxiety to minimize energy expenditure.  Encourage gradual increase of activity as condition improves.  Position for optimal comfort and activity tolerance (e.g., sitting for self-care).  Monitor physiologic response to activity; adjust accordingly.  Provide range of motion actions (active, passive or assistive) per prescribed limitations.  Promote nutrition intake to optimize energy.  Determine need for assistive and adaptive equipment to facilitate activity.  Recent Flowsheet Documentation  Taken 4/3/2021 1335 by Crissy Mendoza, RN  Activity Management: bedrest   Goal Outcome Evaluation:  Plan of Care Reviewed With: patient     Outcome Summary: [facility] bailey @ admission, minimal pain and a/o with no issues noted. while doing admission pt unclear re: meds, did PTA meds to best of ability and attached med list to chart for MD's to verify as the list from [facility] different. Dilaudid x 1 gvn, pt tolerated well and pain has been relieved at this time with no issues noted.
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
## 2021-04-04 PROBLEM — S72.001A CLOSED RIGHT HIP FRACTURE, INITIAL ENCOUNTER: Status: ACTIVE | Noted: 2021-04-04

## 2021-04-04 LAB
ANION GAP SERPL CALCULATED.3IONS-SCNC: 8.2 MMOL/L (ref 5–15)
BUN SERPL-MCNC: 12 MG/DL (ref 8–23)
BUN/CREAT SERPL: 12.9 (ref 7–25)
CALCIUM SPEC-SCNC: 8.2 MG/DL (ref 8.6–10.5)
CHLORIDE SERPL-SCNC: 99 MMOL/L (ref 98–107)
CO2 SERPL-SCNC: 27.8 MMOL/L (ref 22–29)
CREAT SERPL-MCNC: 0.93 MG/DL (ref 0.76–1.27)
DEPRECATED RDW RBC AUTO: 43.2 FL (ref 37–54)
ERYTHROCYTE [DISTWIDTH] IN BLOOD BY AUTOMATED COUNT: 11.6 % (ref 12.3–15.4)
GFR SERPL CREATININE-BSD FRML MDRD: 79 ML/MIN/1.73
GLUCOSE SERPL-MCNC: 91 MG/DL (ref 65–99)
HCT VFR BLD AUTO: 35.2 % (ref 37.5–51)
HGB BLD-MCNC: 12.1 G/DL (ref 13–17.7)
MCH RBC QN AUTO: 35.7 PG (ref 26.6–33)
MCHC RBC AUTO-ENTMCNC: 34.4 G/DL (ref 31.5–35.7)
MCV RBC AUTO: 103.8 FL (ref 79–97)
PLATELET # BLD AUTO: 180 10*3/MM3 (ref 140–450)
PMV BLD AUTO: 9.1 FL (ref 6–12)
POTASSIUM SERPL-SCNC: 4.4 MMOL/L (ref 3.5–5.2)
RBC # BLD AUTO: 3.39 10*6/MM3 (ref 4.14–5.8)
SARS-COV-2 ORF1AB RESP QL NAA+PROBE: NOT DETECTED
SODIUM SERPL-SCNC: 135 MMOL/L (ref 136–145)
WBC # BLD AUTO: 4.95 10*3/MM3 (ref 3.4–10.8)

## 2021-04-04 PROCEDURE — 80048 BASIC METABOLIC PNL TOTAL CA: CPT | Performed by: STUDENT IN AN ORGANIZED HEALTH CARE EDUCATION/TRAINING PROGRAM

## 2021-04-04 PROCEDURE — 85027 COMPLETE CBC AUTOMATED: CPT | Performed by: STUDENT IN AN ORGANIZED HEALTH CARE EDUCATION/TRAINING PROGRAM

## 2021-04-04 PROCEDURE — 25010000002 HYDROMORPHONE PER 4 MG: Performed by: NURSE PRACTITIONER

## 2021-04-04 RX ADMIN — HYDROMORPHONE HYDROCHLORIDE 0.5 MG: 1 INJECTION, SOLUTION INTRAMUSCULAR; INTRAVENOUS; SUBCUTANEOUS at 15:46

## 2021-04-04 RX ADMIN — TERAZOSIN HYDROCHLORIDE 10 MG: 5 CAPSULE ORAL at 21:57

## 2021-04-04 RX ADMIN — SODIUM CHLORIDE, PRESERVATIVE FREE 10 ML: 5 INJECTION INTRAVENOUS at 11:02

## 2021-04-04 RX ADMIN — OXYCODONE HYDROCHLORIDE AND ACETAMINOPHEN 1 TABLET: 7.5; 325 TABLET ORAL at 08:37

## 2021-04-04 RX ADMIN — HYDROMORPHONE HYDROCHLORIDE 0.5 MG: 1 INJECTION, SOLUTION INTRAMUSCULAR; INTRAVENOUS; SUBCUTANEOUS at 19:57

## 2021-04-04 RX ADMIN — Medication 1 TABLET: at 08:21

## 2021-04-04 RX ADMIN — OXYCODONE HYDROCHLORIDE AND ACETAMINOPHEN 1 TABLET: 7.5; 325 TABLET ORAL at 18:20

## 2021-04-04 RX ADMIN — PANTOPRAZOLE SODIUM 40 MG: 40 TABLET, DELAYED RELEASE ORAL at 06:42

## 2021-04-04 RX ADMIN — OXYCODONE HYDROCHLORIDE AND ACETAMINOPHEN 1 TABLET: 7.5; 325 TABLET ORAL at 22:11

## 2021-04-04 RX ADMIN — CARBAMAZEPINE 400 MG: 200 TABLET ORAL at 08:22

## 2021-04-04 RX ADMIN — Medication 1000 MCG: at 08:21

## 2021-04-04 RX ADMIN — HYDROMORPHONE HYDROCHLORIDE 0.5 MG: 1 INJECTION, SOLUTION INTRAMUSCULAR; INTRAVENOUS; SUBCUTANEOUS at 06:42

## 2021-04-04 RX ADMIN — SODIUM CHLORIDE, PRESERVATIVE FREE 10 ML: 5 INJECTION INTRAVENOUS at 21:57

## 2021-04-04 RX ADMIN — FAMOTIDINE 20 MG: 20 TABLET, FILM COATED ORAL at 06:42

## 2021-04-04 RX ADMIN — FAMOTIDINE 20 MG: 20 TABLET, FILM COATED ORAL at 18:20

## 2021-04-04 RX ADMIN — Medication 100 MG: at 08:21

## 2021-04-04 RX ADMIN — OXYCODONE HYDROCHLORIDE AND ACETAMINOPHEN 1 TABLET: 7.5; 325 TABLET ORAL at 00:26

## 2021-04-04 RX ADMIN — ATORVASTATIN CALCIUM 40 MG: 20 TABLET, FILM COATED ORAL at 21:58

## 2021-04-04 RX ADMIN — CARBAMAZEPINE 400 MG: 200 TABLET ORAL at 21:57

## 2021-04-04 RX ADMIN — HYDROMORPHONE HYDROCHLORIDE 0.5 MG: 1 INJECTION, SOLUTION INTRAMUSCULAR; INTRAVENOUS; SUBCUTANEOUS at 21:56

## 2021-04-04 RX ADMIN — HYDROMORPHONE HYDROCHLORIDE 0.5 MG: 1 INJECTION, SOLUTION INTRAMUSCULAR; INTRAVENOUS; SUBCUTANEOUS at 11:01

## 2021-04-04 RX ADMIN — FOLIC ACID 1 MG: 1 TABLET ORAL at 08:21

## 2021-04-04 RX ADMIN — HYDROMORPHONE HYDROCHLORIDE 0.5 MG: 1 INJECTION, SOLUTION INTRAMUSCULAR; INTRAVENOUS; SUBCUTANEOUS at 03:48

## 2021-04-04 NOTE — PLAN OF CARE
Problem: Adult Inpatient Plan of Care  Goal: Plan of Care Review  Outcome: Ongoing, Progressing  Flowsheets (Taken 4/4/2021 0401)  Progress: no change  Plan of Care Reviewed With: patient  Outcome Summary: Patient was medicated with PRN Dilaudid 0.5mg IV and Percocet for pain. Patient is on strict bedrest. Patient slept between care. Will continue to monitor vital signs, labs and comfort. Day shift RN will have [facility] to [facility] Epic system.      Goal Outcome Evaluation:  Plan of Care Reviewed With: patient  Progress: no change  Outcome Summary: Patient was medicated with PRN Dilaudid 0.5mg IV and Percocet for pain. Patient is on strict bedrest. Patient slept between care. Will continue to monitor vital signs, labs and comfort.

## 2021-04-04 NOTE — PROGRESS NOTES
Name: Regan Cavazos ADMIT: 4/3/2021   : 1945  PCP: Latricia Schulz APRN    MRN: [id number] LOS: 1 days   AGE/SEX: 76 y.o. male  ROOM: [facility]     Subjective   Subjective   Still having a lot of pain in his right leg/hip/toes with some numbness. Left shoulder is somewhat improved.         Objective   Objective   Vital Signs  Temp:  [97 °F (36.1 °C)-98.3 °F (36.8 °C)] 97 °F (36.1 °C)  Heart Rate:  [60-79] 60  Resp:  [16-20] 18  BP: (103-120)/(62-66) 116/65  SpO2:  [93 %-96 %] 96 %  on   ;   Device (Oxygen Therapy): room air  Body mass index is 27.93 kg/m².  Physical Exam  Constitutional:       General: He is not in acute distress.  HENT:      Head: Normocephalic and atraumatic.   Cardiovascular:      Rate and Rhythm: Normal rate and regular rhythm.      Pulses: Normal pulses.      Heart sounds: Normal heart sounds.   Pulmonary:      Effort: Pulmonary effort is normal.      Breath sounds: Normal breath sounds.   Abdominal:      General: Bowel sounds are normal.      Palpations: Abdomen is soft.   Musculoskeletal:         General: Tenderness present. No swelling.      Right lower leg: No edema.      Left lower leg: No edema.   Neurological:      Mental Status: He is alert and oriented to person, place, and time.   Psychiatric:         Mood and Affect: Mood normal.         Behavior: Behavior normal.         Results Review     I reviewed the patient's new clinical results.  Results from last 7 days   Lab Units 21  0801 21  1644   WBC 10*3/mm3 4.95 6.58   HEMOGLOBIN g/dL 12.1* 12.6*   PLATELETS 10*3/mm3 180 174     Results from last 7 days   Lab Units 21  0801 21  1644   SODIUM mmol/L 135* 138   POTASSIUM mmol/L 4.4 4.6   CHLORIDE mmol/L 99 102   CO2 mmol/L 27.8 25.1   BUN mg/dL 12 10   CREATININE mg/dL 0.93 0.81   GLUCOSE mg/dL 91 75   Estimated Creatinine Clearance: 85.1 mL/min (by C-G formula based on SCr of 0.93 mg/dL).  Results from last 7 days   Lab Units 21  0554    ALBUMIN g/dL 3.60   BILIRUBIN mg/dL 0.6   ALK PHOS U/L 50   AST (SGOT) U/L 18   ALT (SGPT) U/L 10     Results from last 7 days   Lab Units 04/04/21  0801 04/03/21  1644   CALCIUM mg/dL 8.2* 8.0*   ALBUMIN g/dL  --  3.60       COVID19   Date Value Ref Range Status   04/03/2021 Not Detected Not Detected - Ref. Range Final     No results found for: HGBA1C, POCGLU    XR Shoulder 2+ View Left  Narrative: 2 VIEWS LEFT SHOULDER     HISTORY: Shoulder pain after a fall     COMPARISON: None available.     FINDINGS:  The patient has deformity of the inferior aspect of the left scapula. It  actually may be chronic, given the presence of an old left clavicle  fracture and multiple old left-sided rib fractures. CT could be  considered for additional evaluation. No acute fracture or subluxation  of the left shoulder is seen.     Impression: Suspected chronic fracture of the inferior aspect of the left scapula,  as well as an old left clavicle fracture and multiple old left-sided rib  fractures. No definite acute fracture or subluxation of the left  shoulder is seen. However, CT could be considered, if symptoms persist.     This report was finalized on 4/3/2021 7:36 PM by Dr. Marylou Kilpatrick M.D.     XR Chest 1 View  Narrative: CHEST SINGLE VIEW     HISTORY: Preoperative exam for hip surgery.     COMPARISON: None.     FINDINGS: Median sternotomy wires are present. There are multiple old  healed left rib fractures with resultant left thoracic cage deformity.  Mild chronic overlying pleural thickening is present. Lungs otherwise  appear clear and there is no evidence for pulmonary or pleural effusion  or infiltrate. There is an old healed left clavicle fracture.     Impression: Chronic changes without evidence for active disease in the  chest.     This report was finalized on 4/3/2021 4:18 PM by Dr. Jw Rose M.D.       Scheduled Medications  atorvastatin, 40 mg, Oral, Daily  carBAMazepine, 400 mg, Oral, BID  famotidine,  20 mg, Oral, BID AC  fexofenadine, 180 mg, Oral, Daily  thiamine, 100 mg, Oral, Daily   And  multivitamin, 1 tablet, Oral, Daily   And  folic acid, 1 mg, Oral, Daily  modafinil, 200 mg, Oral, Daily  pantoprazole, 40 mg, Oral, QAM  sodium chloride, 10 mL, Intravenous, Q12H  terazosin, 10 mg, Oral, Nightly  vitamin B-12, 1,000 mcg, Oral, Daily    Infusions   Diet  Diet Regular; Cardiac       Assessment/Plan     Active Hospital Problems    Diagnosis  POA   • **Broken internal right hip prosthesis (CMS/[facility]) [T84.010A]  Yes   • Closed right hip fracture, initial encounter (CMS/[facility]) [S72.001A]  Yes   • GERD (gastroesophageal reflux disease) [K21.9]  Yes   • Seizures (CMS/[facility]) [R56.9]  Yes   • BPH (benign prostatic hyperplasia) [N40.0]  Yes   • Coronary artery disease [I25.10]  Yes      Resolved Hospital Problems   No resolved problems to display.       76 y.o. male admitted with Broken internal right hip prosthesis (CMS/[facility]).    Right hip prosthesis fracture-ortho is consult. Asa is held. Per nursing, plan for OR either tomorrow or Tuesday. Will ask cardiology for piotr-operative risk eval given history of CAD with CABG, carotid stenosis.    Left shoulder pain-x-ray shows chronic fracture of the inferior aspect of the left scapula, old left clavicle fracture, and multiple old left sided rib fractures, but no acute fracture or subluxation of the left shoulder is seen. Pt says pain is stable to improved. If pain persists or worsens, will check CT.     Alcohol dependence with intoxication-monitor CIWA, thiamine, folate, mvi     CAD s/p CABG in 2016  Carotid artery stenosis  DDD  OA  GERD  TBI with seizure disorder  Anxiety/depression  History of prostate cancer s/p xrt  ADHD  HLD  BPH     Please note that patient's home med list as listed in our system is inaccurate and patient doesn't know his medications. [facility] is list below:    Home medications:  asa 81, carbamazepine 400 bid, cyanocobalamin 1000 daily, diclofenac 1% topical gel, famotidine 20 mg bid, fexofenadine 180 daily, modafinil 200 daily, mvi daily, pantoprazole 40 mg daily, simvastatin 40 mg daily, terazosin 10 mg daily.     · SCDs for DVT prophylaxis.  · Full code.  · Discussed with patient and nursing staff.  · Anticipate discharge TBD      Juancarlos Piña MD  [facility]  04/04/21  13:57 EDT    I wore protective equipment throughout this patient encounter including a face mask, gloves and protective eyewear.  Hand hygiene was performed before donning protective equipment and after removal when leaving the room.

## 2021-04-05 ENCOUNTER — APPOINTMENT (OUTPATIENT)
Dept: GENERAL RADIOLOGY | Facility: HOSPITAL | Age: 76
End: 2021-04-05

## 2021-04-05 ENCOUNTER — APPOINTMENT (OUTPATIENT)
Dept: CARDIOLOGY | Facility: HOSPITAL | Age: 76
End: 2021-04-05

## 2021-04-05 LAB
ANION GAP SERPL CALCULATED.3IONS-SCNC: 6 MMOL/L (ref 5–15)
AORTIC DIMENSIONLESS INDEX: 0.7 (DI)
BH CV ECHO MEAS - AO MAX PG (FULL): 3.4 MMHG
BH CV ECHO MEAS - AO MAX PG: 7.2 MMHG
BH CV ECHO MEAS - AO MEAN PG (FULL): 2 MMHG
BH CV ECHO MEAS - AO MEAN PG: 4 MMHG
BH CV ECHO MEAS - AO ROOT AREA (BSA CORRECTED): 1.6
BH CV ECHO MEAS - AO ROOT AREA: 9.6 CM^2
BH CV ECHO MEAS - AO ROOT DIAM: 3.5 CM
BH CV ECHO MEAS - AO V2 MAX: 134 CM/SEC
BH CV ECHO MEAS - AO V2 MEAN: 93 CM/SEC
BH CV ECHO MEAS - AO V2 VTI: 25.7 CM
BH CV ECHO MEAS - AVA(I,A): 2.5 CM^2
BH CV ECHO MEAS - AVA(I,D): 2.5 CM^2
BH CV ECHO MEAS - AVA(V,A): 2.5 CM^2
BH CV ECHO MEAS - AVA(V,D): 2.5 CM^2
BH CV ECHO MEAS - BSA(HAYCOCK): 2.2 M^2
BH CV ECHO MEAS - BSA: 2.2 M^2
BH CV ECHO MEAS - BZI_BMI: 28.6 KILOGRAMS/M^2
BH CV ECHO MEAS - BZI_METRIC_HEIGHT: 182.9 CM
BH CV ECHO MEAS - BZI_METRIC_WEIGHT: 95.7 KG
BH CV ECHO MEAS - EDV(CUBED): 110.6 ML
BH CV ECHO MEAS - EDV(MOD-SP2): 103 ML
BH CV ECHO MEAS - EDV(MOD-SP4): 108 ML
BH CV ECHO MEAS - EDV(TEICH): 107.5 ML
BH CV ECHO MEAS - EF(CUBED): 46.4 %
BH CV ECHO MEAS - EF(MOD-BP): 51.8 %
BH CV ECHO MEAS - EF(MOD-SP2): 51.5 %
BH CV ECHO MEAS - EF(MOD-SP4): 51.9 %
BH CV ECHO MEAS - EF(TEICH): 38.7 %
BH CV ECHO MEAS - ESV(CUBED): 59.3 ML
BH CV ECHO MEAS - ESV(MOD-SP2): 50 ML
BH CV ECHO MEAS - ESV(MOD-SP4): 52 ML
BH CV ECHO MEAS - ESV(TEICH): 65.9 ML
BH CV ECHO MEAS - FS: 18.8 %
BH CV ECHO MEAS - IVS/LVPW: 0.77
BH CV ECHO MEAS - IVSD: 1 CM
BH CV ECHO MEAS - LAT PEAK E' VEL: 13.3 CM/SEC
BH CV ECHO MEAS - LV DIASTOLIC VOL/BSA (35-75): 49.6 ML/M^2
BH CV ECHO MEAS - LV MASS(C)D: 206.4 GRAMS
BH CV ECHO MEAS - LV MASS(C)DI: 94.7 GRAMS/M^2
BH CV ECHO MEAS - LV MAX PG: 3.7 MMHG
BH CV ECHO MEAS - LV MEAN PG: 2 MMHG
BH CV ECHO MEAS - LV SYSTOLIC VOL/BSA (12-30): 23.9 ML/M^2
BH CV ECHO MEAS - LV V1 MAX: 96.6 CM/SEC
BH CV ECHO MEAS - LV V1 MEAN: 61.5 CM/SEC
BH CV ECHO MEAS - LV V1 VTI: 18.3 CM
BH CV ECHO MEAS - LVIDD: 4.8 CM
BH CV ECHO MEAS - LVIDS: 3.9 CM
BH CV ECHO MEAS - LVLD AP2: 8.8 CM
BH CV ECHO MEAS - LVLD AP4: 7.8 CM
BH CV ECHO MEAS - LVLS AP2: 7.5 CM
BH CV ECHO MEAS - LVLS AP4: 6.7 CM
BH CV ECHO MEAS - LVOT AREA (M): 3.5 CM^2
BH CV ECHO MEAS - LVOT AREA: 3.5 CM^2
BH CV ECHO MEAS - LVOT DIAM: 2.1 CM
BH CV ECHO MEAS - LVPWD: 1.3 CM
BH CV ECHO MEAS - MED PEAK E' VEL: 9.6 CM/SEC
BH CV ECHO MEAS - MV A MAX VEL: 82.7 CM/SEC
BH CV ECHO MEAS - MV DEC SLOPE: 237 CM/SEC^2
BH CV ECHO MEAS - MV DEC TIME: 308 SEC
BH CV ECHO MEAS - MV E MAX VEL: 72.8 CM/SEC
BH CV ECHO MEAS - MV E/A: 0.88
BH CV ECHO MEAS - MV MAX PG: 4.7 MMHG
BH CV ECHO MEAS - MV MEAN PG: 1 MMHG
BH CV ECHO MEAS - MV P1/2T MAX VEL: 94.7 CM/SEC
BH CV ECHO MEAS - MV P1/2T: 117 MSEC
BH CV ECHO MEAS - MV V2 MAX: 108 CM/SEC
BH CV ECHO MEAS - MV V2 MEAN: 56.5 CM/SEC
BH CV ECHO MEAS - MV V2 VTI: 30.4 CM
BH CV ECHO MEAS - MVA P1/2T LCG: 2.3 CM^2
BH CV ECHO MEAS - MVA(P1/2T): 1.9 CM^2
BH CV ECHO MEAS - MVA(VTI): 2.1 CM^2
BH CV ECHO MEAS - RAP SYSTOLE: 3 MMHG
BH CV ECHO MEAS - SI(AO): 113.5 ML/M^2
BH CV ECHO MEAS - SI(CUBED): 23.5 ML/M^2
BH CV ECHO MEAS - SI(LVOT): 29.1 ML/M^2
BH CV ECHO MEAS - SI(MOD-SP2): 24.3 ML/M^2
BH CV ECHO MEAS - SI(MOD-SP4): 25.7 ML/M^2
BH CV ECHO MEAS - SI(TEICH): 19.1 ML/M^2
BH CV ECHO MEAS - SV(AO): 247.3 ML
BH CV ECHO MEAS - SV(CUBED): 51.3 ML
BH CV ECHO MEAS - SV(LVOT): 63.4 ML
BH CV ECHO MEAS - SV(MOD-SP2): 53 ML
BH CV ECHO MEAS - SV(MOD-SP4): 56 ML
BH CV ECHO MEAS - SV(TEICH): 41.6 ML
BH CV ECHO MEAS - TAPSE (>1.6): 1.9 CM
BH CV ECHO MEASUREMENTS AVERAGE E/E' RATIO: 6.36
BH CV XLRA - RV BASE: 4.2 CM
BH CV XLRA - TDI S': 12 CM/SEC
BUN SERPL-MCNC: 14 MG/DL (ref 8–23)
BUN/CREAT SERPL: 14.3 (ref 7–25)
CALCIUM SPEC-SCNC: 8.2 MG/DL (ref 8.6–10.5)
CHLORIDE SERPL-SCNC: 100 MMOL/L (ref 98–107)
CO2 SERPL-SCNC: 29 MMOL/L (ref 22–29)
CREAT SERPL-MCNC: 0.98 MG/DL (ref 0.76–1.27)
DEPRECATED RDW RBC AUTO: 43.6 FL (ref 37–54)
ERYTHROCYTE [DISTWIDTH] IN BLOOD BY AUTOMATED COUNT: 11.7 % (ref 12.3–15.4)
GFR SERPL CREATININE-BSD FRML MDRD: 75 ML/MIN/1.73
GLUCOSE SERPL-MCNC: 92 MG/DL (ref 65–99)
HCT VFR BLD AUTO: 34.2 % (ref 37.5–51)
HGB BLD-MCNC: 11.8 G/DL (ref 13–17.7)
LEFT ATRIUM VOLUME INDEX: 24 ML/M2
LV EF 2D ECHO EST: 52 %
MAXIMAL PREDICTED HEART RATE: 146 BPM
MCH RBC QN AUTO: 35.9 PG (ref 26.6–33)
MCHC RBC AUTO-ENTMCNC: 34.5 G/DL (ref 31.5–35.7)
MCV RBC AUTO: 104 FL (ref 79–97)
PLATELET # BLD AUTO: 169 10*3/MM3 (ref 140–450)
PMV BLD AUTO: 8.8 FL (ref 6–12)
POTASSIUM SERPL-SCNC: 4.4 MMOL/L (ref 3.5–5.2)
RBC # BLD AUTO: 3.29 10*6/MM3 (ref 4.14–5.8)
SODIUM SERPL-SCNC: 135 MMOL/L (ref 136–145)
STRESS TARGET HR: 124 BPM
WBC # BLD AUTO: 5.28 10*3/MM3 (ref 3.4–10.8)

## 2021-04-05 PROCEDURE — 93306 TTE W/DOPPLER COMPLETE: CPT

## 2021-04-05 PROCEDURE — 25010000002 HYDROMORPHONE PER 4 MG: Performed by: NURSE PRACTITIONER

## 2021-04-05 PROCEDURE — 73502 X-RAY EXAM HIP UNI 2-3 VIEWS: CPT

## 2021-04-05 PROCEDURE — 85027 COMPLETE CBC AUTOMATED: CPT | Performed by: STUDENT IN AN ORGANIZED HEALTH CARE EDUCATION/TRAINING PROGRAM

## 2021-04-05 PROCEDURE — 93306 TTE W/DOPPLER COMPLETE: CPT | Performed by: INTERNAL MEDICINE

## 2021-04-05 PROCEDURE — 80048 BASIC METABOLIC PNL TOTAL CA: CPT | Performed by: STUDENT IN AN ORGANIZED HEALTH CARE EDUCATION/TRAINING PROGRAM

## 2021-04-05 PROCEDURE — 99222 1ST HOSP IP/OBS MODERATE 55: CPT | Performed by: NURSE PRACTITIONER

## 2021-04-05 PROCEDURE — 25010000002 PERFLUTREN (DEFINITY) 8.476 MG IN SODIUM CHLORIDE (PF) 0.9 % 10 ML INJECTION: Performed by: NURSE PRACTITIONER

## 2021-04-05 RX ORDER — LORAZEPAM 2 MG/ML
1 INJECTION INTRAMUSCULAR
Status: DISCONTINUED | OUTPATIENT
Start: 2021-04-05 | End: 2021-04-10

## 2021-04-05 RX ORDER — LORAZEPAM 0.5 MG/1
0.5 TABLET ORAL
Status: DISCONTINUED | OUTPATIENT
Start: 2021-04-05 | End: 2021-04-10

## 2021-04-05 RX ORDER — LORAZEPAM 1 MG/1
1 TABLET ORAL
Status: DISCONTINUED | OUTPATIENT
Start: 2021-04-05 | End: 2021-04-10

## 2021-04-05 RX ORDER — LORAZEPAM 2 MG/ML
0.5 INJECTION INTRAMUSCULAR
Status: DISCONTINUED | OUTPATIENT
Start: 2021-04-05 | End: 2021-04-10

## 2021-04-05 RX ORDER — CEFAZOLIN SODIUM 2 G/100ML
2 INJECTION, SOLUTION INTRAVENOUS
Status: COMPLETED | OUTPATIENT
Start: 2021-04-06 | End: 2021-04-06

## 2021-04-05 RX ADMIN — PERFLUTREN 3 ML: 6.52 INJECTION, SUSPENSION INTRAVENOUS at 17:15

## 2021-04-05 RX ADMIN — FAMOTIDINE 20 MG: 20 TABLET, FILM COATED ORAL at 08:19

## 2021-04-05 RX ADMIN — HYDROMORPHONE HYDROCHLORIDE 0.5 MG: 1 INJECTION, SOLUTION INTRAMUSCULAR; INTRAVENOUS; SUBCUTANEOUS at 12:53

## 2021-04-05 RX ADMIN — HYDROMORPHONE HYDROCHLORIDE 0.5 MG: 1 INJECTION, SOLUTION INTRAMUSCULAR; INTRAVENOUS; SUBCUTANEOUS at 10:41

## 2021-04-05 RX ADMIN — ATORVASTATIN CALCIUM 40 MG: 20 TABLET, FILM COATED ORAL at 08:18

## 2021-04-05 RX ADMIN — MODAFINIL 200 MG: 100 TABLET ORAL at 08:18

## 2021-04-05 RX ADMIN — TERAZOSIN HYDROCHLORIDE 10 MG: 5 CAPSULE ORAL at 20:14

## 2021-04-05 RX ADMIN — SODIUM CHLORIDE, PRESERVATIVE FREE 10 ML: 5 INJECTION INTRAVENOUS at 08:18

## 2021-04-05 RX ADMIN — Medication 1000 MCG: at 08:18

## 2021-04-05 RX ADMIN — HYDROMORPHONE HYDROCHLORIDE 0.5 MG: 1 INJECTION, SOLUTION INTRAMUSCULAR; INTRAVENOUS; SUBCUTANEOUS at 15:58

## 2021-04-05 RX ADMIN — LORAZEPAM 1 MG: 1 TABLET ORAL at 22:40

## 2021-04-05 RX ADMIN — HYDROMORPHONE HYDROCHLORIDE 0.5 MG: 1 INJECTION, SOLUTION INTRAMUSCULAR; INTRAVENOUS; SUBCUTANEOUS at 02:03

## 2021-04-05 RX ADMIN — HYDROMORPHONE HYDROCHLORIDE 0.5 MG: 1 INJECTION, SOLUTION INTRAMUSCULAR; INTRAVENOUS; SUBCUTANEOUS at 20:08

## 2021-04-05 RX ADMIN — HYDROMORPHONE HYDROCHLORIDE 0.5 MG: 1 INJECTION, SOLUTION INTRAMUSCULAR; INTRAVENOUS; SUBCUTANEOUS at 22:40

## 2021-04-05 RX ADMIN — PANTOPRAZOLE SODIUM 40 MG: 40 TABLET, DELAYED RELEASE ORAL at 06:55

## 2021-04-05 RX ADMIN — HYDROMORPHONE HYDROCHLORIDE 0.5 MG: 1 INJECTION, SOLUTION INTRAMUSCULAR; INTRAVENOUS; SUBCUTANEOUS at 05:19

## 2021-04-05 RX ADMIN — Medication 100 MG: at 08:18

## 2021-04-05 RX ADMIN — OXYCODONE HYDROCHLORIDE AND ACETAMINOPHEN 1 TABLET: 7.5; 325 TABLET ORAL at 02:03

## 2021-04-05 RX ADMIN — FOLIC ACID 1 MG: 1 TABLET ORAL at 08:18

## 2021-04-05 RX ADMIN — OXYCODONE HYDROCHLORIDE AND ACETAMINOPHEN 1 TABLET: 7.5; 325 TABLET ORAL at 06:55

## 2021-04-05 RX ADMIN — Medication 1 TABLET: at 08:18

## 2021-04-05 RX ADMIN — CARBAMAZEPINE 400 MG: 200 TABLET ORAL at 20:14

## 2021-04-05 RX ADMIN — CARBAMAZEPINE 400 MG: 200 TABLET ORAL at 08:19

## 2021-04-05 RX ADMIN — OXYCODONE HYDROCHLORIDE AND ACETAMINOPHEN 1 TABLET: 7.5; 325 TABLET ORAL at 14:52

## 2021-04-05 RX ADMIN — SODIUM CHLORIDE, PRESERVATIVE FREE 10 ML: 5 INJECTION INTRAVENOUS at 20:14

## 2021-04-05 RX ADMIN — HYDROMORPHONE HYDROCHLORIDE 0.5 MG: 1 INJECTION, SOLUTION INTRAMUSCULAR; INTRAVENOUS; SUBCUTANEOUS at 08:18

## 2021-04-05 NOTE — PLAN OF CARE
Goal Outcome Evaluation:  Plan of Care Reviewed With: patient  Progress: no change  Outcome Summary: Pt has remained on bedrest during the night, on turn team- assist with postioning.  Complains of pain of right hip and left shoulder--pain controlled with dilaudid and percocet.  VSS. A&Ox4.

## 2021-04-05 NOTE — PROGRESS NOTES
Discharge Planning Assessment  [facility]     Patient Name: Regan Cavazos  MRN: [id number]  Today's Date: 4/5/2021    Admit Date: 4/3/2021    Discharge Needs Assessment     Row Name 04/05/21 1623       Living Environment    Lives With  spouse    Name(s) of Who Lives With Patient  Robyn Araiza wife [phone]    Current Living Arrangements  home/apartment/condo    Primary Care Provided by  self    Provides Primary Care For  no one    Family Caregiver if Needed  spouse    Family Caregiver Names  Robyn Araiza wife [phone]    Quality of Family Relationships  unable to assess    Able to Return to Prior Arrangements  no       Resource/Environmental Concerns    Resource/Environmental Concerns  none       Transition Planning    Patient/Family Anticipated Services at Transition  rehabilitation services;skilled nursing       Discharge Needs Assessment    Readmission Within the Last 30 Days  no previous admission in last 30 days    Concerns to be Addressed  discharge planning    Anticipated Changes Related to Illness  inability to care for self    Provided Post Acute Provider List?  N/A    N/A Provider List Comment  Will address after surgery    Provided Post Acute Provider Quality & Resource List?  N/A    Patient's Choice of Community Agency(s)  Will address after surgery    Current Discharge Risk  substance use/abuse        Discharge Plan     Row Name 04/05/21 3007       Plan    Plan  Pending surgery fx hip.    Plan Comments  IMM 4/5/2021.  Met with patient at bedside. He immediately informed me he was going through a divorce. Face sheet verified. Prior to admission, he was able to perform his own ADL’s. He denies using any special or adaptive equipment.  [facility] He does not have POA or living will.  He goes to the [place] or [facility] clinics. Will address discharge plans once surgical intervention completed. Will continue to monitor for new or changing discharge needs.         Continued Care and Services - Admitted Since 4/3/2021    Coordination has not been started for this encounter.         Demographic Summary     Row Name 04/05/21 1618       General Information    Admission Type  inpatient    Arrived From  emergency department    Required Notices Provided  Important Message from Medicare    Referral Source  admission list    Reason for Consult  discharge planning    Preferred Language  English     Used During This Interaction  no       Contact Information    Permission Granted to Share Info With  family/designee Robyn Araiza wife [phone]        Functional Status     Row Name 04/05/21 1618       Functional Status    Usual Activity Tolerance  good    Current Activity Tolerance  moderate       Functional Status, IADL    Medications  independent       Mental Status    General Appearance WDL  WDL       Mental Status Summary    Recent Changes in Mental Status/Cognitive Functioning  no changes       Employment/    Employment Status  retired;, previous service    Current or Previous Occupation             Current or Previous  Service  [facility]    [facility]  [place]        Psychosocial    No documentation.       Abuse/Neglect    No documentation.       Legal    No documentation.       Substance Abuse    No documentation.       Patient Forms    No documentation.           Fallon Obrien RN
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
## 2021-04-05 NOTE — PLAN OF CARE
Goal Outcome Evaluation:  Plan of Care Reviewed With: patient  Progress: no change  Outcome Summary: C/o pain to the right leg/hip. Dilaudid given x4 and percocet given x1. Some confusion this afternoon and repeatetive with speech and thoughts. ortho surgery tomorrow and will maybe move to ortho unit. Echo done today for surgery clearance. VSS. Will continue to monitor.

## 2021-04-05 NOTE — PROGRESS NOTES
Name: Regan Cavazos ADMIT: 4/3/2021   : 1945  PCP: Latricia Schulz APRN    MRN: [id number] LOS: 2 days   AGE/SEX: 76 y.o. male  ROOM: [facility]     Subjective   Subjective     Doing well today. Says that shoulder pain is improved. Repeats history to me again which he says is related to his short term memory loss after his TBI.       Objective   Objective   Vital Signs  Temp:  [96.9 °F (36.1 °C)-97.8 °F (36.6 °C)] 96.9 °F (36.1 °C)  Heart Rate:  [60-76] 68  Resp:  [16-18] 16  BP: (102-118)/(58-71) 116/71  SpO2:  [94 %-98 %] 97 %  on   ;   Device (Oxygen Therapy): room air  Body mass index is 27.93 kg/m².  Physical Exam  Constitutional:       General: He is not in acute distress.  HENT:      Head: Normocephalic and atraumatic.   Cardiovascular:      Rate and Rhythm: Normal rate and regular rhythm.      Pulses: Normal pulses.      Heart sounds: Normal heart sounds.   Pulmonary:      Effort: Pulmonary effort is normal.      Breath sounds: Normal breath sounds.   Abdominal:      General: Bowel sounds are normal.      Palpations: Abdomen is soft.   Musculoskeletal:         General: Tenderness present. No swelling.      Right lower leg: No edema.      Left lower leg: No edema.   Neurological:      Mental Status: He is alert and oriented to person, place, and time.   Psychiatric:         Mood and Affect: Mood normal.         Behavior: Behavior normal.         Results Review     I reviewed the patient's new clinical results.  Results from last 7 days   Lab Units 21  0521  0801 21  1644   WBC 10*3/mm3 5.28 4.95 6.58   HEMOGLOBIN g/dL 11.8* 12.1* 12.6*   PLATELETS 10*3/mm3 169 180 174     Results from last 7 days   Lab Units 21  0521  0801 21  1644   SODIUM mmol/L 135* 135* 138   POTASSIUM mmol/L 4.4 4.4 4.6   CHLORIDE mmol/L 100 99 102   CO2 mmol/L 29.0 27.8 25.1   BUN mg/dL 14 12 10   CREATININE mg/dL 0.98 0.93 0.81   GLUCOSE mg/dL 92 91 75   Estimated Creatinine  Clearance: 80.7 mL/min (by C-G formula based on SCr of 0.98 mg/dL).  Results from last 7 days   Lab Units 04/03/21  1644   ALBUMIN g/dL 3.60   BILIRUBIN mg/dL 0.6   ALK PHOS U/L 50   AST (SGOT) U/L 18   ALT (SGPT) U/L 10     Results from last 7 days   Lab Units 04/05/21  0521 04/04/21  0801 04/03/21  1644   CALCIUM mg/dL 8.2* 8.2* 8.0*   ALBUMIN g/dL  --   --  3.60       COVID19   Date Value Ref Range Status   04/03/2021 Not Detected Not Detected - Ref. Range Final     No results found for: HGBA1C, POCGLU    XR Hip With or Without Pelvis 2 - 3 View Right  Narrative: AP PELVIS AND AP/FROG LATERAL RIGHT HIP     HISTORY: Preoperative exam. Right hip pain.     COMPARISON: AP right hip 11/14/2014.     FINDINGS: There is a right subtrochanteric periprosthetic fracture that  extends obliquely from just below the right greater trochanter  inferomedially to the medial subtrochanteric cortex and this fracture is  associated with approximate 15 mm inferolateral displacement. Right  total hip arthroplasty is present. There are mild arthritic changes of  the left hip joint. [place] catheter is present. There has been previous  posterior lateral instrumented fusion within the lumbar spine.     Impression: Right total hip arthroplasty with subtrochanteric displaced  periprosthetic fracture.     This report was finalized on 4/5/2021 9:22 AM by Dr. Jw Rose M.D.       Scheduled Medications  atorvastatin, 40 mg, Oral, Daily  carBAMazepine, 400 mg, Oral, BID  famotidine, 20 mg, Oral, BID AC  fexofenadine, 180 mg, Oral, Daily  thiamine, 100 mg, Oral, Daily   And  multivitamin, 1 tablet, Oral, Daily   And  folic acid, 1 mg, Oral, Daily  modafinil, 200 mg, Oral, Daily  pantoprazole, 40 mg, Oral, QAM  sodium chloride, 10 mL, Intravenous, Q12H  terazosin, 10 mg, Oral, Nightly  vitamin B-12, 1,000 mcg, Oral, Daily    Infusions   Diet  Diet Regular; Consistent Carbohydrate       Assessment/Plan     Active Hospital Problems     Diagnosis  POA   • **Broken internal right hip prosthesis (CMS/[facility]) [T84.010A]  Yes   • Closed right hip fracture, initial encounter (CMS/[facility]) [S72.001A]  Yes   • GERD (gastroesophageal reflux disease) [K21.9]  Yes   • Seizures (CMS/[facility]) [R56.9]  Yes   • BPH (benign prostatic hyperplasia) [N40.0]  Yes   • Coronary artery disease [I25.10]  Yes      Resolved Hospital Problems   No resolved problems to display.       76 y.o. male admitted with Broken internal right hip prosthesis (CMS/[facility]).    Right hip prosthesis fracture-ortho is consult. Asa is held. Per nursing, plan for OR tomorrow? Appreciate cardiology recommendations. F/u echocardiogram.    Left shoulder pain-x-ray shows chronic fracture of the inferior aspect of the left scapula, old left clavicle fracture, and multiple old left sided rib fractures, but no acute fracture or subluxation of the left shoulder is seen. Pt says pain is stable to improved. If pain persists or worsens, will check CT.     Alcohol dependence with intoxication-monitor CIWA, thiamine, folate, mvi     CAD s/p CABG in 2016  Carotid artery stenosis  DDD  OA  GERD  TBI with seizure disorder  Anxiety/depression  History of prostate cancer s/p xrt  ADHD  HLD  BPH     Please note that patient's home med list as listed in our system is inaccurate and patient doesn't know his medications. [facility] medication list as [facility] is list below:    Home medications: asa 81, carbamazepine 400 bid, cyanocobalamin 1000 daily, diclofenac 1% topical gel, famotidine 20 mg bid, fexofenadine 180 daily, modafinil 200 daily, mvi daily, pantoprazole 40 mg daily, simvastatin 40 mg daily, terazosin 10 mg daily.     · SCDs for DVT prophylaxis.  · Full code.  · Discussed with patient and nursing staff.  · Anticipate discharge to rehab, but timing to be determined      Juancarlos Piña MD  [facility]  04/05/21  17:49 EDT    I wore protective equipment  throughout this patient encounter including a face mask, gloves and protective eyewear.  Hand hygiene was performed before donning protective equipment and after removal when leaving the room.

## 2021-04-06 ENCOUNTER — APPOINTMENT (OUTPATIENT)
Dept: GENERAL RADIOLOGY | Facility: HOSPITAL | Age: 76
End: 2021-04-06

## 2021-04-06 ENCOUNTER — ANESTHESIA EVENT (OUTPATIENT)
Dept: PERIOP | Facility: HOSPITAL | Age: 76
End: 2021-04-06

## 2021-04-06 ENCOUNTER — ANESTHESIA (OUTPATIENT)
Dept: PERIOP | Facility: HOSPITAL | Age: 76
End: 2021-04-06

## 2021-04-06 LAB
ANION GAP SERPL CALCULATED.3IONS-SCNC: 7.3 MMOL/L (ref 5–15)
BUN SERPL-MCNC: 11 MG/DL (ref 8–23)
BUN/CREAT SERPL: 14.7 (ref 7–25)
CALCIUM SPEC-SCNC: 8.3 MG/DL (ref 8.6–10.5)
CHLORIDE SERPL-SCNC: 100 MMOL/L (ref 98–107)
CO2 SERPL-SCNC: 29.7 MMOL/L (ref 22–29)
CREAT SERPL-MCNC: 0.75 MG/DL (ref 0.76–1.27)
DEPRECATED RDW RBC AUTO: 43.5 FL (ref 37–54)
ERYTHROCYTE [DISTWIDTH] IN BLOOD BY AUTOMATED COUNT: 11.7 % (ref 12.3–15.4)
GFR SERPL CREATININE-BSD FRML MDRD: 102 ML/MIN/1.73
GLUCOSE SERPL-MCNC: 85 MG/DL (ref 65–99)
HCT VFR BLD AUTO: 32.2 % (ref 37.5–51)
HGB BLD-MCNC: 11.1 G/DL (ref 13–17.7)
MCH RBC QN AUTO: 35.5 PG (ref 26.6–33)
MCHC RBC AUTO-ENTMCNC: 34.5 G/DL (ref 31.5–35.7)
MCV RBC AUTO: 102.9 FL (ref 79–97)
PLATELET # BLD AUTO: 164 10*3/MM3 (ref 140–450)
PMV BLD AUTO: 9.1 FL (ref 6–12)
POTASSIUM SERPL-SCNC: 4.4 MMOL/L (ref 3.5–5.2)
RBC # BLD AUTO: 3.13 10*6/MM3 (ref 4.14–5.8)
SARS-COV-2 ORF1AB RESP QL NAA+PROBE: NOT DETECTED
SODIUM SERPL-SCNC: 137 MMOL/L (ref 136–145)
WBC # BLD AUTO: 5.18 10*3/MM3 (ref 3.4–10.8)

## 2021-04-06 PROCEDURE — 25010000003 BUPIVACAINE LIPOSOME 1.3 % SUSPENSION 20 ML VIAL: Performed by: ORTHOPAEDIC SURGERY

## 2021-04-06 PROCEDURE — C1713 ANCHOR/SCREW BN/BN,TIS/BN: HCPCS | Performed by: ORTHOPAEDIC SURGERY

## 2021-04-06 PROCEDURE — 0SP90JZ REMOVAL OF SYNTHETIC SUBSTITUTE FROM RIGHT HIP JOINT, OPEN APPROACH: ICD-10-PCS | Performed by: ORTHOPAEDIC SURGERY

## 2021-04-06 PROCEDURE — 80048 BASIC METABOLIC PNL TOTAL CA: CPT | Performed by: STUDENT IN AN ORGANIZED HEALTH CARE EDUCATION/TRAINING PROGRAM

## 2021-04-06 PROCEDURE — C1889 IMPLANT/INSERT DEVICE, NOC: HCPCS | Performed by: ORTHOPAEDIC SURGERY

## 2021-04-06 PROCEDURE — C1776 JOINT DEVICE (IMPLANTABLE): HCPCS | Performed by: ORTHOPAEDIC SURGERY

## 2021-04-06 PROCEDURE — 73501 X-RAY EXAM HIP UNI 1 VIEW: CPT

## 2021-04-06 PROCEDURE — 25010000002 HYDRALAZINE PER 20 MG

## 2021-04-06 PROCEDURE — 25010000002 FENTANYL CITRATE (PF) 100 MCG/2ML SOLUTION: Performed by: NURSE ANESTHETIST, CERTIFIED REGISTERED

## 2021-04-06 PROCEDURE — 25010000002 LORAZEPAM PER 2 MG: Performed by: STUDENT IN AN ORGANIZED HEALTH CARE EDUCATION/TRAINING PROGRAM

## 2021-04-06 PROCEDURE — U0005 INFEC AGEN DETEC AMPLI PROBE: HCPCS | Performed by: ORTHOPAEDIC SURGERY

## 2021-04-06 PROCEDURE — 25010000002 HYDROMORPHONE PER 4 MG: Performed by: NURSE ANESTHETIST, CERTIFIED REGISTERED

## 2021-04-06 PROCEDURE — 25010000002 HYDROMORPHONE PER 4 MG: Performed by: NURSE PRACTITIONER

## 2021-04-06 PROCEDURE — U0004 COV-19 TEST NON-CDC HGH THRU: HCPCS | Performed by: ORTHOPAEDIC SURGERY

## 2021-04-06 PROCEDURE — 25010000003 CEFAZOLIN IN DEXTROSE 2-4 GM/100ML-% SOLUTION: Performed by: ORTHOPAEDIC SURGERY

## 2021-04-06 PROCEDURE — 25010000002 PHENYLEPHRINE PER 1 ML: Performed by: ANESTHESIOLOGY

## 2021-04-06 PROCEDURE — 25010000002 FENTANYL CITRATE (PF) 100 MCG/2ML SOLUTION: Performed by: ANESTHESIOLOGY

## 2021-04-06 PROCEDURE — C9290 INJ, BUPIVACAINE LIPOSOME: HCPCS | Performed by: ORTHOPAEDIC SURGERY

## 2021-04-06 PROCEDURE — 85027 COMPLETE CBC AUTOMATED: CPT | Performed by: STUDENT IN AN ORGANIZED HEALTH CARE EDUCATION/TRAINING PROGRAM

## 2021-04-06 PROCEDURE — 0SR90JZ REPLACEMENT OF RIGHT HIP JOINT WITH SYNTHETIC SUBSTITUTE, OPEN APPROACH: ICD-10-PCS | Performed by: ORTHOPAEDIC SURGERY

## 2021-04-06 PROCEDURE — 25010000002 PROPOFOL 10 MG/ML EMULSION: Performed by: ANESTHESIOLOGY

## 2021-04-06 DEVICE — VIOLET ANTIBACTERIAL POLYDIOXANONE, KNOTLESS TISSUE CONTROL DEVICE
Type: IMPLANTABLE DEVICE | Site: HIP | Status: FUNCTIONAL
Brand: STRATAFIX

## 2021-04-06 DEVICE — ACCORD STANDARD 195MM 8 CABLE                                    TROCHANTERIC GRIP
Type: IMPLANTABLE DEVICE | Site: HIP | Status: FUNCTIONAL
Brand: ACCORD

## 2021-04-06 DEVICE — OR3O DUAL MOBILITY XLPE INSERT 28/46
Type: IMPLANTABLE DEVICE | Site: HIP | Status: FUNCTIONAL
Brand: OR3O DUAL MOBILITY

## 2021-04-06 DEVICE — KNOTLESS TISSUE CONTROL DEVICE, VIOLET UNIDIRECTIONAL (ANTIBACTERIAL) SYNTHETIC ABSORBABLE DEVICE
Type: IMPLANTABLE DEVICE | Site: HIP | Status: FUNCTIONAL
Brand: STRATAFIX

## 2021-04-06 DEVICE — REDAPT 240MM SLEEVELESS REVISION                                    STEM SIZE 16 HIGH OFFSET
Type: IMPLANTABLE DEVICE | Site: HIP | Status: FUNCTIONAL
Brand: REDAPT

## 2021-04-06 DEVICE — ACCORD 2.0MM COBALT CHROME CABLE
Type: IMPLANTABLE DEVICE | Site: HIP | Status: FUNCTIONAL
Brand: ACCORD

## 2021-04-06 DEVICE — OXINIUM FEMORAL HEAD 12/14 TAPER                                    28 MM +8
Type: IMPLANTABLE DEVICE | Site: HIP | Status: FUNCTIONAL
Brand: OXINIUM

## 2021-04-06 DEVICE — OR3O DUAL MOBILITY LINER 46/60
Type: IMPLANTABLE DEVICE | Site: HIP | Status: FUNCTIONAL
Brand: OR3O DUAL MOBILITY

## 2021-04-06 DEVICE — ACCORD 2.0MM COBALT CHROME CABLE                                    WITH CLAMP
Type: IMPLANTABLE DEVICE | Site: HIP | Status: FUNCTIONAL
Brand: ACCORD

## 2021-04-06 RX ORDER — LIDOCAINE HYDROCHLORIDE 10 MG/ML
0.5 INJECTION, SOLUTION EPIDURAL; INFILTRATION; INTRACAUDAL; PERINEURAL ONCE AS NEEDED
Status: DISCONTINUED | OUTPATIENT
Start: 2021-04-06 | End: 2021-04-06 | Stop reason: HOSPADM

## 2021-04-06 RX ORDER — LORAZEPAM 1 MG/1
1 TABLET ORAL
Status: DISCONTINUED | OUTPATIENT
Start: 2021-04-06 | End: 2021-04-10

## 2021-04-06 RX ORDER — PROMETHAZINE HYDROCHLORIDE 25 MG/1
25 SUPPOSITORY RECTAL ONCE AS NEEDED
Status: DISCONTINUED | OUTPATIENT
Start: 2021-04-06 | End: 2021-04-06 | Stop reason: HOSPADM

## 2021-04-06 RX ORDER — FENTANYL CITRATE 50 UG/ML
50 INJECTION, SOLUTION INTRAMUSCULAR; INTRAVENOUS
Status: DISCONTINUED | OUTPATIENT
Start: 2021-04-06 | End: 2021-04-06 | Stop reason: HOSPADM

## 2021-04-06 RX ORDER — LIDOCAINE HYDROCHLORIDE 20 MG/ML
INJECTION, SOLUTION INFILTRATION; PERINEURAL AS NEEDED
Status: DISCONTINUED | OUTPATIENT
Start: 2021-04-06 | End: 2021-04-06 | Stop reason: SURG

## 2021-04-06 RX ORDER — PROMETHAZINE HYDROCHLORIDE 25 MG/1
25 TABLET ORAL ONCE AS NEEDED
Status: DISCONTINUED | OUTPATIENT
Start: 2021-04-06 | End: 2021-04-06 | Stop reason: HOSPADM

## 2021-04-06 RX ORDER — LORAZEPAM 2 MG/ML
1 INJECTION INTRAMUSCULAR
Status: DISCONTINUED | OUTPATIENT
Start: 2021-04-06 | End: 2021-04-10

## 2021-04-06 RX ORDER — PROPOFOL 10 MG/ML
VIAL (ML) INTRAVENOUS AS NEEDED
Status: DISCONTINUED | OUTPATIENT
Start: 2021-04-06 | End: 2021-04-06 | Stop reason: SURG

## 2021-04-06 RX ORDER — HYDROMORPHONE HYDROCHLORIDE 1 MG/ML
0.5 INJECTION, SOLUTION INTRAMUSCULAR; INTRAVENOUS; SUBCUTANEOUS
Status: DISCONTINUED | OUTPATIENT
Start: 2021-04-06 | End: 2021-04-06 | Stop reason: HOSPADM

## 2021-04-06 RX ORDER — OXYCODONE AND ACETAMINOPHEN 7.5; 325 MG/1; MG/1
1 TABLET ORAL ONCE AS NEEDED
Status: DISCONTINUED | OUTPATIENT
Start: 2021-04-06 | End: 2021-04-06 | Stop reason: HOSPADM

## 2021-04-06 RX ORDER — DIPHENHYDRAMINE HCL 25 MG
25 CAPSULE ORAL
Status: DISCONTINUED | OUTPATIENT
Start: 2021-04-06 | End: 2021-04-06 | Stop reason: HOSPADM

## 2021-04-06 RX ORDER — SODIUM CHLORIDE 0.9 % (FLUSH) 0.9 %
3 SYRINGE (ML) INJECTION EVERY 12 HOURS SCHEDULED
Status: DISCONTINUED | OUTPATIENT
Start: 2021-04-06 | End: 2021-04-06 | Stop reason: HOSPADM

## 2021-04-06 RX ORDER — MEPERIDINE HYDROCHLORIDE 25 MG/ML
12.5 INJECTION INTRAMUSCULAR; INTRAVENOUS; SUBCUTANEOUS
Status: DISCONTINUED | OUTPATIENT
Start: 2021-04-06 | End: 2021-04-06 | Stop reason: HOSPADM

## 2021-04-06 RX ORDER — LABETALOL HYDROCHLORIDE 5 MG/ML
5 INJECTION, SOLUTION INTRAVENOUS
Status: DISCONTINUED | OUTPATIENT
Start: 2021-04-06 | End: 2021-04-06 | Stop reason: HOSPADM

## 2021-04-06 RX ORDER — SODIUM CHLORIDE 0.9 % (FLUSH) 0.9 %
3-10 SYRINGE (ML) INJECTION AS NEEDED
Status: DISCONTINUED | OUTPATIENT
Start: 2021-04-06 | End: 2021-04-06 | Stop reason: HOSPADM

## 2021-04-06 RX ORDER — HYDRALAZINE HYDROCHLORIDE 20 MG/ML
5 INJECTION INTRAMUSCULAR; INTRAVENOUS
Status: DISCONTINUED | OUTPATIENT
Start: 2021-04-06 | End: 2021-04-06 | Stop reason: HOSPADM

## 2021-04-06 RX ORDER — SODIUM CHLORIDE, SODIUM LACTATE, POTASSIUM CHLORIDE, CALCIUM CHLORIDE 600; 310; 30; 20 MG/100ML; MG/100ML; MG/100ML; MG/100ML
9 INJECTION, SOLUTION INTRAVENOUS CONTINUOUS
Status: DISCONTINUED | OUTPATIENT
Start: 2021-04-06 | End: 2021-04-14 | Stop reason: HOSPADM

## 2021-04-06 RX ORDER — DIPHENHYDRAMINE HYDROCHLORIDE 50 MG/ML
12.5 INJECTION INTRAMUSCULAR; INTRAVENOUS
Status: DISCONTINUED | OUTPATIENT
Start: 2021-04-06 | End: 2021-04-06 | Stop reason: HOSPADM

## 2021-04-06 RX ORDER — ACETAMINOPHEN 325 MG/1
650 TABLET ORAL ONCE AS NEEDED
Status: DISCONTINUED | OUTPATIENT
Start: 2021-04-06 | End: 2021-04-06 | Stop reason: HOSPADM

## 2021-04-06 RX ORDER — TRANEXAMIC ACID 100 MG/ML
INJECTION, SOLUTION INTRAVENOUS AS NEEDED
Status: DISCONTINUED | OUTPATIENT
Start: 2021-04-06 | End: 2021-04-06 | Stop reason: SURG

## 2021-04-06 RX ORDER — ROCURONIUM BROMIDE 10 MG/ML
INJECTION, SOLUTION INTRAVENOUS AS NEEDED
Status: DISCONTINUED | OUTPATIENT
Start: 2021-04-06 | End: 2021-04-06 | Stop reason: SURG

## 2021-04-06 RX ORDER — NALOXONE HCL 0.4 MG/ML
0.2 VIAL (ML) INJECTION AS NEEDED
Status: DISCONTINUED | OUTPATIENT
Start: 2021-04-06 | End: 2021-04-06 | Stop reason: HOSPADM

## 2021-04-06 RX ORDER — LORAZEPAM 0.5 MG/1
0.5 TABLET ORAL
Status: DISCONTINUED | OUTPATIENT
Start: 2021-04-06 | End: 2021-04-10

## 2021-04-06 RX ORDER — FLUMAZENIL 0.1 MG/ML
0.2 INJECTION INTRAVENOUS AS NEEDED
Status: DISCONTINUED | OUTPATIENT
Start: 2021-04-06 | End: 2021-04-06 | Stop reason: HOSPADM

## 2021-04-06 RX ORDER — HYDROCODONE BITARTRATE AND ACETAMINOPHEN 7.5; 325 MG/1; MG/1
1 TABLET ORAL ONCE AS NEEDED
Status: DISCONTINUED | OUTPATIENT
Start: 2021-04-06 | End: 2021-04-06 | Stop reason: HOSPADM

## 2021-04-06 RX ORDER — EPHEDRINE SULFATE 50 MG/ML
5 INJECTION, SOLUTION INTRAVENOUS ONCE AS NEEDED
Status: DISCONTINUED | OUTPATIENT
Start: 2021-04-06 | End: 2021-04-06 | Stop reason: HOSPADM

## 2021-04-06 RX ORDER — CEFAZOLIN SODIUM 2 G/100ML
2 INJECTION, SOLUTION INTRAVENOUS EVERY 8 HOURS
Status: COMPLETED | OUTPATIENT
Start: 2021-04-07 | End: 2021-04-07

## 2021-04-06 RX ORDER — FAMOTIDINE 10 MG/ML
20 INJECTION, SOLUTION INTRAVENOUS ONCE
Status: COMPLETED | OUTPATIENT
Start: 2021-04-06 | End: 2021-04-06

## 2021-04-06 RX ORDER — ONDANSETRON 2 MG/ML
4 INJECTION INTRAMUSCULAR; INTRAVENOUS ONCE AS NEEDED
Status: DISCONTINUED | OUTPATIENT
Start: 2021-04-06 | End: 2021-04-06 | Stop reason: HOSPADM

## 2021-04-06 RX ORDER — OXYCODONE HYDROCHLORIDE AND ACETAMINOPHEN 5; 325 MG/1; MG/1
2 TABLET ORAL EVERY 4 HOURS PRN
Status: DISCONTINUED | OUTPATIENT
Start: 2021-04-06 | End: 2021-04-14 | Stop reason: HOSPADM

## 2021-04-06 RX ORDER — HYDRALAZINE HYDROCHLORIDE 20 MG/ML
INJECTION INTRAMUSCULAR; INTRAVENOUS
Status: COMPLETED
Start: 2021-04-06 | End: 2021-04-06

## 2021-04-06 RX ORDER — LORAZEPAM 2 MG/ML
0.5 INJECTION INTRAMUSCULAR
Status: DISCONTINUED | OUTPATIENT
Start: 2021-04-06 | End: 2021-04-10

## 2021-04-06 RX ORDER — OXYCODONE HYDROCHLORIDE AND ACETAMINOPHEN 5; 325 MG/1; MG/1
1 TABLET ORAL EVERY 4 HOURS PRN
Status: DISCONTINUED | OUTPATIENT
Start: 2021-04-06 | End: 2021-04-14 | Stop reason: HOSPADM

## 2021-04-06 RX ORDER — FLUTICASONE PROPIONATE 50 MCG
2 SPRAY, SUSPENSION (ML) NASAL DAILY
Status: DISCONTINUED | OUTPATIENT
Start: 2021-04-07 | End: 2021-04-14 | Stop reason: HOSPADM

## 2021-04-06 RX ADMIN — FENTANYL CITRATE 25 MCG: 50 INJECTION INTRAMUSCULAR; INTRAVENOUS at 19:33

## 2021-04-06 RX ADMIN — LORAZEPAM 1 MG: 2 INJECTION INTRAMUSCULAR; INTRAVENOUS at 17:04

## 2021-04-06 RX ADMIN — PHENYLEPHRINE HYDROCHLORIDE 100 MCG: 10 INJECTION INTRAVENOUS at 20:02

## 2021-04-06 RX ADMIN — CARBAMAZEPINE 400 MG: 200 TABLET ORAL at 08:49

## 2021-04-06 RX ADMIN — FOLIC ACID 1 MG: 1 TABLET ORAL at 08:50

## 2021-04-06 RX ADMIN — LORAZEPAM 1 MG: 1 TABLET ORAL at 03:29

## 2021-04-06 RX ADMIN — HYDROMORPHONE HYDROCHLORIDE 0.5 MG: 1 INJECTION, SOLUTION INTRAMUSCULAR; INTRAVENOUS; SUBCUTANEOUS at 22:40

## 2021-04-06 RX ADMIN — ROCURONIUM BROMIDE 40 MG: 50 INJECTION INTRAVENOUS at 19:05

## 2021-04-06 RX ADMIN — HYDROMORPHONE HYDROCHLORIDE 0.5 MG: 1 INJECTION, SOLUTION INTRAMUSCULAR; INTRAVENOUS; SUBCUTANEOUS at 22:05

## 2021-04-06 RX ADMIN — LIDOCAINE HYDROCHLORIDE 60 MG: 20 INJECTION, SOLUTION INFILTRATION; PERINEURAL at 19:05

## 2021-04-06 RX ADMIN — FENTANYL CITRATE 50 MCG: 50 INJECTION, SOLUTION INTRAMUSCULAR; INTRAVENOUS at 21:40

## 2021-04-06 RX ADMIN — OXYCODONE HYDROCHLORIDE AND ACETAMINOPHEN 1 TABLET: 7.5; 325 TABLET ORAL at 03:29

## 2021-04-06 RX ADMIN — Medication 1000 MCG: at 08:50

## 2021-04-06 RX ADMIN — ATORVASTATIN CALCIUM 40 MG: 20 TABLET, FILM COATED ORAL at 08:50

## 2021-04-06 RX ADMIN — HYDRALAZINE HYDROCHLORIDE 5 MG: 20 INJECTION, SOLUTION INTRAMUSCULAR; INTRAVENOUS at 22:33

## 2021-04-06 RX ADMIN — FENTANYL CITRATE 50 MCG: 50 INJECTION, SOLUTION INTRAMUSCULAR; INTRAVENOUS at 21:35

## 2021-04-06 RX ADMIN — MODAFINIL 200 MG: 100 TABLET ORAL at 08:50

## 2021-04-06 RX ADMIN — HYDROMORPHONE HYDROCHLORIDE 0.5 MG: 1 INJECTION, SOLUTION INTRAMUSCULAR; INTRAVENOUS; SUBCUTANEOUS at 07:24

## 2021-04-06 RX ADMIN — PHENYLEPHRINE HYDROCHLORIDE 100 MCG: 10 INJECTION INTRAVENOUS at 19:50

## 2021-04-06 RX ADMIN — HYDRALAZINE HYDROCHLORIDE 5 MG: 20 INJECTION INTRAMUSCULAR; INTRAVENOUS at 22:33

## 2021-04-06 RX ADMIN — CEFAZOLIN SODIUM 2 G: 2 INJECTION, SOLUTION INTRAVENOUS at 18:49

## 2021-04-06 RX ADMIN — FAMOTIDINE 20 MG: 10 INJECTION INTRAVENOUS at 18:36

## 2021-04-06 RX ADMIN — Medication 100 MG: at 08:50

## 2021-04-06 RX ADMIN — Medication 1 TABLET: at 08:50

## 2021-04-06 RX ADMIN — PROPOFOL 150 MG: 10 INJECTION, EMULSION INTRAVENOUS at 19:05

## 2021-04-06 RX ADMIN — FENTANYL CITRATE 25 MCG: 50 INJECTION INTRAMUSCULAR; INTRAVENOUS at 19:19

## 2021-04-06 RX ADMIN — FENTANYL CITRATE 50 MCG: 50 INJECTION INTRAMUSCULAR; INTRAVENOUS at 19:38

## 2021-04-06 RX ADMIN — FENTANYL CITRATE 25 MCG: 50 INJECTION INTRAMUSCULAR; INTRAVENOUS at 18:35

## 2021-04-06 RX ADMIN — LABETALOL HYDROCHLORIDE 5 MG: 5 INJECTION, SOLUTION INTRAVENOUS at 22:05

## 2021-04-06 RX ADMIN — PHENYLEPHRINE HYDROCHLORIDE 100 MCG: 10 INJECTION INTRAVENOUS at 19:19

## 2021-04-06 RX ADMIN — SODIUM CHLORIDE, PRESERVATIVE FREE 10 ML: 5 INJECTION INTRAVENOUS at 08:51

## 2021-04-06 RX ADMIN — TRANEXAMIC ACID 1000 MG: 1 INJECTION, SOLUTION INTRAVENOUS at 19:19

## 2021-04-06 RX ADMIN — PHENYLEPHRINE HYDROCHLORIDE 100 MCG: 10 INJECTION INTRAVENOUS at 20:30

## 2021-04-06 RX ADMIN — LORAZEPAM 1 MG: 2 INJECTION INTRAMUSCULAR; INTRAVENOUS at 13:45

## 2021-04-06 RX ADMIN — SODIUM CHLORIDE, POTASSIUM CHLORIDE, SODIUM LACTATE AND CALCIUM CHLORIDE 9 ML/HR: 600; 310; 30; 20 INJECTION, SOLUTION INTRAVENOUS at 18:35

## 2021-04-06 RX ADMIN — HYDROMORPHONE HYDROCHLORIDE 0.5 MG: 1 INJECTION, SOLUTION INTRAMUSCULAR; INTRAVENOUS; SUBCUTANEOUS at 10:57

## 2021-04-06 RX ADMIN — PHENYLEPHRINE HYDROCHLORIDE 200 MCG: 10 INJECTION INTRAVENOUS at 19:55

## 2021-04-06 RX ADMIN — HYDROMORPHONE HYDROCHLORIDE 0.5 MG: 1 INJECTION, SOLUTION INTRAMUSCULAR; INTRAVENOUS; SUBCUTANEOUS at 15:34

## 2021-04-06 NOTE — PLAN OF CARE
Goal Outcome Evaluation:  Plan of Care Reviewed With: patient  Progress: no change  Outcome Summary: Pt remains confused. PRN Ativan given per CIWA protocol. PRn pain medications given. To OR this evening, possible transfer to Ortho floor after surgery
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
## 2021-04-06 NOTE — PROGRESS NOTES
I reviewed Faye Zavala nurse practitioner's note from yesterday and I agree with her assessment.  I reviewed his echo and he has normal LV function.  No further heart testing is required before surgery.  I recommend he proceed with surgery.  Please call if he has any complications after the surgery.  [facility]

## 2021-04-06 NOTE — PLAN OF CARE
Goal Outcome Evaluation:  Plan of Care Reviewed With: patient  Progress: declining   Pt had become increasingly confused during night, frequented room often to remind pt of bedrest.  Pt not oriented to place, time, or situation--became adament about leaving- raising voice, attempting to take bailey out. Boundary vest initiated to use as a reminder.  CIWA also in use, pt demonstrated 8-13 during the night.  Calmed down more toward morning.  His neighbor came to visit and this seemed to help. VSS.

## 2021-04-06 NOTE — CONSULTS
"Adult Nutrition  Assessment/PES    Patient Name:  Regan Cavazos  YOB: 1945  MRN: [id number]  Admit Date:  4/3/2021    Assessment Date:  4/6/2021  Nutrition assessment triggered by MD consult-diet education.   Diagnosis: Broken internal right hip prosthesis, alcohol dependence with intoxication.  Diet: NPO-surgery today  Supplement: not at this time  PO intake: N/A  Vitamin/minerals: needs to be on appropriate for alcoholic intoxication (thiamine, folic acid, multivitamin)  Labs: Trig N/A, ALT: WNL, AST: WNL  Access center consult: yes  RD to monitor po intake, clinical course and any further nutrition education needs.    Reason for Assessment     Row Name 04/06/21 1124          Reason for Assessment    Reason For Assessment  physician consult     Diagnosis   Right hip prosthesis fracture-ortho is consult, surgery today, Alcohol dependence with intoxication; CAD, OA, GERD, TBI, prostate cancer, ADHD, HLD, BPH     Identified At Risk by Screening Criteria  need for education         Nutrition/Diet History     Row Name 04/06/21 1124          Nutrition/Diet History    Typical Food/Fluid Intake  NPO for surgery today         Anthropometrics     Row Name 04/06/21 1124          Anthropometrics    Height  185.4 cm (72.99\")        Admit Weight    Admit Weight  96 kg (211 lb 10.3 oz)        Ideal Body Weight (IBW)    Ideal Body Weight (IBW) (kg)  84.84     % of Ideal Body Weight Assessment  -- 113%        Body Mass Index (BMI)    BMI Assessment  BMI 25-29.9: overweight BMI-27.9         Labs/Tests/Procedures/Meds     Row Name 04/06/21 1129          Labs/Procedures/Meds    Lab Results Reviewed  reviewed, pertinent        Diagnostic Tests/Procedures    Diagnostic Test/Procedure Reviewed  reviewed, pertinent        Medications    Pertinent Medications Comments  PPI, NaCl, vitamin B12         Physical Findings     Row Name 04/06/21 1129          Physical Findings    Overall Physical Appearance  -- B=16     " "    Estimated/Assessed Needs     Row Name 04/06/21 1124          Calculation Measurements    Height  185.4 cm (72.99\")         Nutrition Prescription Ordered     Row Name 04/06/21 1129          Nutrition Prescription PO    Current PO Diet  NPO                 Problem/Interventions:  Problem 1     Row Name 04/06/21 1130          Nutrition Diagnoses Problem 1    Problem 1  Inadequate Nutrient Intake     Etiology (related to)  Medical Diagnosis     Substance Use  ETOH     Signs/Symptoms (evidenced by)  NPO               Intervention Goal     Row Name 04/06/21 1130          Intervention Goal    General  Maintain nutrition;Reduce/improve symptoms;Meet nutritional needs for age/condition     PO  Initiate feeding     Weight  Maintain weight         Nutrition Intervention     Row Name 04/06/21 1130          Nutrition Intervention    RD/Tech Action  Care plan reviewd;Follow Tx progress;Await begin PO           Education/Evaluation     Row Name 04/06/21 1130          Education    Education  Will Instruct as appropriate        Monitor/Evaluation    Monitor  Per protocol           Electronically signed by:  Digna Jacobson RD  04/06/21 11:30 EDT  "

## 2021-04-06 NOTE — OP NOTE
Total Hip Revision Operative Note  Dr. ANALI Monteiro II  [phone]    PATIENT NAME: Regan Cavazos  MRN: [id number]  : 1945 AGE: 76 y.o. GENDER: male  DATE OF OPERATION: 2021  PREOPERATIVE DIAGNOSIS: Periprosthetic Fracture: This patient was noted to have an unstable periprosthetic fracture requiring revision hip arthroplasty surgery.   POSTOPERATIVE DIAGNOSIS: Same  OPERATION PERFORMED: Right Total Hip Revision  SURGEON: Lex Monteiro MD  * No surgical staff found *  ANESTHESIA: General  ASSISTANT: Apolonia Collazo. This case would not have been possible without another set of skilled surgical hands for retraction, use of instrumentation, and general assistance.  This assistance was vital to the success of the case.   ESTIMATED BLOOD LOSS: 400cc  SPONGE AND NEEDLE COUNT: Correct  INDICATIONS: Periprosthetic Fracture: This patient was noted to have an unstable periprosthetic fracture requiring revision hip arthroplasty surgery.  A discussion of operative versus nonoperative treatment was had with the patient. They elected to undergo revision hip arthroplasty. The risks of surgery were discussed and included the risk of anesthesia, infection, damage to neurovascular structures, implant loosening/failure, fracture, hardware prominence, dislocation, the need for further procedures, medical complications, and others. No guarantees were made. The patient wished to proceed with surgery and a surgical consent was signed.  COMPONENTS:   · Acetabulum: [employer] OR 3 oh dual mobility liner for a 60 mm cup  · Femur: Readapt femoral stem: 16 x 240 mm high offset  · Head ball: Dual mobility + 8  · Accord trochanteric  plate with multiple cables    PERTINENT FINDINGS: Periprosthetic femoral fracture    DETAILS OF PROCEDURE:   The patient was met in the preoperative area. The site was marked. The consent and H&P were reviewed. The patient was then wheeled back to the operative suite and underwent  anesthesia. The patient was positioned laterally using the pegboard.  An axillary roll was placed under the down arm. Excess hair about the operative hip was removed using surgical clippers. Surgical alcohol was used to thoroughly clean the entire operative extremity.     The hip and leg were then prepped in the normal sterile fashion, which included ChloraPrep, multiple layers of sterile drapes, and surgical space suits for the entire operative team. New outer gloves were used by all sterile surgical team members after final draping. The surgical incision was marked. A surgical timeout was performed in which administration of preoperative antibiotics and tranexamic acid, if not contraindicated, was confirmed.    A standard posterior approach to the hip was then utilized. Retractors were then placed around the acetabulum and excess scar tissue was excised. The hip was then dislocated and the femoral head was removed using a bone tamp and mallet.     The stem was grossly loose proximally where all of the bony on growth surface was for the stem.  However, distally it was reasonably well fixated but this was just cold welding of the bone to a smooth the stem implant.  The patient also complained about a leg length discrepancy and very much wanted the stem removed.  After trying to mallet the stem out it was clear that it was quite well fixated distally and I elected to perform a small osteotomy below the fracture site.  The osteotomy came out very nicely in 1 piece and I was able to easily free up the stem and extract it.  I then closed down the osteotomy site and passed 2 cables and tightened these.  I also passed a prophylactic cable distal to the osteotomy site.  I then turned my attention to the acetabulum where the liner was noted.  There was some mild wear of the liner and 4 stability purposes I elected to remove the plastic liner and place a dual mobility liner.  This was snapped into place without too much  "difficulty.  I then turned my attention back to the femur where the trochanteric piece was reduced back down to the shaft using a plate with a reamer inside of the canal for a guide.  Multiple cables were passed and these were all tightened.  I then began reaming for the stem.  After adequate chatter was noted, proximal reaming was then performed and then the hip was trialed.  I had to trial it multiple times changing the version to allow for adequate stability.  I did end up having to use a high offset stem due to the soft tissue and bony damage from his fracture.  After adequate trialing, the real implant was opened and inserted in the same depth and version as the trial.  A real head ball was then inserted after trialing once again.  The hip was thoroughly irrigated and closed in layers and a sterile dressing was applied.    The patient was moved from the operative table to the bed. The patient was taken to the recovery room in stable condition. There were no complications and the patient tolerated the procedure well.    R \"Garry\" Cayetano WIGGINS MD  Orthopaedic Surgery  [facility]  [phone]                \  "

## 2021-04-06 NOTE — PROGRESS NOTES
Name: Regan Cavazos ADMIT: 4/3/2021   : 1945  PCP: Latricia Schulz APRN    MRN: [id number] LOS: 3 days   AGE/SEX: 76 y.o. male  ROOM: [facility]     Subjective   Subjective     Confused overnight, and reportedly having some hallucinations.  His CIWA score has been as high as 12.  These have been his first signs of withdrawal.  This morning he is disoriented.  He realizes that he is in the hospital, [facility] and he [facility] testing.  He does not know the month or day, but he does know the year. he denies substantial pain.  Questioned again about his alcohol use, and he endorses 4 glasses of wine a day.  This is the third or fourth different answer that he was given, each time increasing.  Again he was found to be intoxicated when [facility].  He complains of some nasal congestion.       Objective   Objective   Vital Signs  Temp:  [96.9 °F (36.1 °C)-98.5 °F (36.9 °C)] 97.9 °F (36.6 °C)  Heart Rate:  [61-81] 61  Resp:  [16-18] 18  BP: (111-135)/(61-79) 111/64  SpO2:  [95 %-98 %] 95 %  on   ;   Device (Oxygen Therapy): room air  Body mass index is 27.93 kg/m².  Physical Exam  Constitutional:       General: He is not in acute distress.  HENT:      Head: Normocephalic and atraumatic.   Cardiovascular:      Rate and Rhythm: Normal rate and regular rhythm.      Pulses: Normal pulses.      Heart sounds: Normal heart sounds.   Pulmonary:      Effort: Pulmonary effort is normal.      Breath sounds: Normal breath sounds.   Abdominal:      General: Bowel sounds are normal.      Palpations: Abdomen is soft.   Musculoskeletal:         General: Tenderness present. No swelling.      Right lower leg: No edema.      Left lower leg: No edema.   Neurological:      Mental Status: He is alert. He is disoriented.   Psychiatric:         Mood and Affect: Mood normal.         Behavior: Behavior normal.         Results Review     I reviewed the patient's new clinical  results.  Results from last 7 days   Lab Units 04/06/21  0640 04/05/21  0521 04/04/21  0801 04/03/21  1644   WBC 10*3/mm3 5.18 5.28 4.95 6.58   HEMOGLOBIN g/dL 11.1* 11.8* 12.1* 12.6*   PLATELETS 10*3/mm3 164 169 180 174     Results from last 7 days   Lab Units 04/06/21  0640 04/05/21  0521 04/04/21  0801 04/03/21  1644   SODIUM mmol/L 137 135* 135* 138   POTASSIUM mmol/L 4.4 4.4 4.4 4.6   CHLORIDE mmol/L 100 100 99 102   CO2 mmol/L 29.7* 29.0 27.8 25.1   BUN mg/dL 11 14 12 10   CREATININE mg/dL 0.75* 0.98 0.93 0.81   GLUCOSE mg/dL 85 92 91 75   Estimated Creatinine Clearance: 98.9 mL/min (A) (by C-G formula based on SCr of 0.75 mg/dL (L)).  Results from last 7 days   Lab Units 04/03/21  1644   ALBUMIN g/dL 3.60   BILIRUBIN mg/dL 0.6   ALK PHOS U/L 50   AST (SGOT) U/L 18   ALT (SGPT) U/L 10     Results from last 7 days   Lab Units 04/06/21  0640 04/05/21  0521 04/04/21  0801 04/03/21  1644   CALCIUM mg/dL 8.3* 8.2* 8.2* 8.0*   ALBUMIN g/dL  --   --   --  3.60       COVID19   Date Value Ref Range Status   04/06/2021 Not Detected Not Detected - Ref. Range Final   04/03/2021 Not Detected Not Detected - Ref. Range Final     No results found for: HGBA1C, POCGLU    Adult Transthoracic Echo Complete W/ Cont if Necessary Per Protocol  · Calculated left ventricular EF = 51.8% Estimated left ventricular EF =   52% Estimated left ventricular EF was in agreement with the calculated   left ventricular EF. Left ventricular systolic function is normal. Normal   left ventricular cavity size and wall thickness noted. All left   ventricular wall segments contract normally. Left ventricular diastolic   function was normal.  · Trace tricuspid valve regurgitation is present. Insufficient TR velocity   profile to estimate the right ventricular systolic pressure.  · Limited 2D imaging of cardiac valves     XR Hip With or Without Pelvis 2 - 3 View Right  Narrative: AP PELVIS AND AP/FROG LATERAL RIGHT HIP     HISTORY: Preoperative exam.  Right hip pain.     COMPARISON: AP right hip 11/14/2014.     FINDINGS: There is a right subtrochanteric periprosthetic fracture that  extends obliquely from just below the right greater trochanter  inferomedially to the medial subtrochanteric cortex and this fracture is  associated with approximate 15 mm inferolateral displacement. Right  total hip arthroplasty is present. There are mild arthritic changes of  the left hip joint. Morataya catheter is present. There has been previous  posterior lateral instrumented fusion within the lumbar spine.     Impression: Right total hip arthroplasty with subtrochanteric displaced  periprosthetic fracture.     This report was finalized on 4/5/2021 9:22 AM by Dr. Jw Rose M.D.       Scheduled Medications  atorvastatin, 40 mg, Oral, Daily  carBAMazepine, 400 mg, Oral, BID  ceFAZolin, 2 g, Intravenous, On Call to OR  famotidine, 20 mg, Oral, BID AC  fexofenadine, 180 mg, Oral, Daily  [START ON 4/7/2021] fluticasone, 2 spray, Each Nare, Daily  modafinil, 200 mg, Oral, Daily  pantoprazole, 40 mg, Oral, QAM  sodium chloride, 10 mL, Intravenous, Q12H  terazosin, 10 mg, Oral, Nightly  vitamin B-12, 1,000 mcg, Oral, Daily    Infusions   Diet  NPO Diet       Assessment/Plan     Active Hospital Problems    Diagnosis  POA   • **Broken internal right hip prosthesis (CMS/[facility]) [T84.010A]  Yes   • Closed right hip fracture, initial encounter (CMS/[facility]) [S72.001A]  Yes   • GERD (gastroesophageal reflux disease) [K21.9]  Yes   • Seizures (CMS/[facility]) [R56.9]  Yes   • BPH (benign prostatic hyperplasia) [N40.0]  Yes   • Coronary artery disease [I25.10]  Yes      Resolved Hospital Problems   No resolved problems to display.       76 y.o. male admitted with Broken internal right hip prosthesis (CMS/HCC).    Right hip prosthesis fracture-ortho is consult. Asa is held. Plan for OR this afternoon with Dr. Monteiro.    Alcohol dependence with intoxication-monitor CIWA, thiamine, folate, mvi. Ativan for  withdrawal.  I have asked the RN to inform Dr. Monteiro that the patient might be having alcohol withdrawal.      Allergic rhinitis-he takes Allegra at home which we do not have on formulary here, and he is allergic to our formulary second-generation antihistamines.  I do not want to give him a first generation antihistamine because of his confusion.  It will not provide immediate relief, but will start fluticasone nasal spray today.    Left shoulder pain-x-ray shows chronic fracture of the inferior aspect of the left scapula, old left clavicle fracture, and multiple old left sided rib fractures, but no acute fracture or subluxation of the left shoulder is seen.       CAD s/p CABG in 2016  Carotid artery stenosis  DDD  OA  GERD  TBI with seizure disorder  Anxiety/depression  History of prostate cancer s/p xrt  ADHD  HLD  BPH     Please note that patient's home med list as listed in our system is inaccurate and patient doesn't know his medications. [facility] medication [facility] is list below:    Home medications: asa 81, carbamazepine 400 bid, cyanocobalamin 1000 daily, diclofenac 1% topical gel, famotidine 20 mg bid, fexofenadine 180 daily, modafinil 200 daily, mvi daily, pantoprazole 40 mg daily, simvastatin 40 mg daily, terazosin 10 mg daily.     · SCDs for DVT prophylaxis.  · Full code.  · Discussed with patient and nursing staff.  · Anticipate discharge to rehab, but timing to be determined      Juancarlos Piña MD  [facility]  04/06/21  10:47 EDT    I wore protective equipment throughout this patient encounter including a face mask, gloves and protective eyewear.  Hand hygiene was performed before donning protective equipment and after removal when leaving the room.

## 2021-04-06 NOTE — ANESTHESIA PROCEDURE NOTES
Airway  Urgency: elective    Date/Time: 4/6/2021 7:07 PM    General Information and Staff    Patient location during procedure: OR  Anesthesiologist: Hector Box MD    Indications and Patient Condition  Indications for airway management: airway protection    Preoxygenated: yes  MILS maintained throughout  Mask difficulty assessment: 1 - vent by mask    Final Airway Details  Final airway type: endotracheal airway      Successful airway: ETT  Cuffed: yes   Successful intubation technique: direct laryngoscopy  Endotracheal tube insertion site: oral  Blade: Atul  Blade size: 3  ETT size (mm): 8.0  Cormack-Lehane Classification: grade I - full view of glottis  Placement verified by: chest auscultation   Measured from: lips  ETT/EBT  to lips (cm): 22  Number of attempts at approach: 1  Assessment: lips, teeth, and gum same as pre-op and atraumatic intubation

## 2021-04-06 NOTE — SIGNIFICANT NOTE
Pt becoming very restless, attempting to get on of bed, and says he's leaving.  He has been becoming increasing confused though night, not oriented to time place or situation.  Attempted to calm pt and reorient.  Wasn't working, behavior escalating.  Staff to initiate nv restraints.  [employer] vest used as a reminder to not get out of bed, notified on call julia Bryant used - with score of 13- able to get pt to take ativan po with pain medications.

## 2021-04-06 NOTE — ANESTHESIA PREPROCEDURE EVALUATION
Anesthesia Evaluation     Patient summary reviewed and Nursing notes reviewed   NPO Solid Status: > 8 hours  NPO Liquid Status: > 2 hours           Airway   Mallampati: I  TM distance: >3 FB  Neck ROM: full  No difficulty expected  Dental - normal exam     Pulmonary - normal exam   (+) a smoker Former,   Cardiovascular - normal exam  Exercise tolerance: unable to assess    (+) CAD, CABG >6 Months,       Neuro/Psych  (+) seizures well controlled, psychiatric history,     GI/Hepatic/Renal/Endo    (+)  GERD,      Musculoskeletal     Abdominal  - normal exam    Bowel sounds: normal.   Substance History - negative use     OB/GYN negative ob/gyn ROS         Other   arthritis,    history of cancer                    Anesthesia Plan    ASA 3     general     intravenous induction     Anesthetic plan, all risks, benefits, and alternatives have been provided, discussed and informed consent has been obtained with: patient.    Plan discussed with attending and CRNA.

## 2021-04-07 LAB
ANION GAP SERPL CALCULATED.3IONS-SCNC: 8.6 MMOL/L (ref 5–15)
BUN SERPL-MCNC: 11 MG/DL (ref 8–23)
BUN/CREAT SERPL: 14.5 (ref 7–25)
CALCIUM SPEC-SCNC: 8.4 MG/DL (ref 8.6–10.5)
CHLORIDE SERPL-SCNC: 99 MMOL/L (ref 98–107)
CO2 SERPL-SCNC: 25.4 MMOL/L (ref 22–29)
CREAT SERPL-MCNC: 0.76 MG/DL (ref 0.76–1.27)
DEPRECATED RDW RBC AUTO: 44.2 FL (ref 37–54)
ERYTHROCYTE [DISTWIDTH] IN BLOOD BY AUTOMATED COUNT: 11.8 % (ref 12.3–15.4)
GFR SERPL CREATININE-BSD FRML MDRD: 100 ML/MIN/1.73
GLUCOSE SERPL-MCNC: 124 MG/DL (ref 65–99)
HCT VFR BLD AUTO: 30.4 % (ref 37.5–51)
HGB BLD-MCNC: 10.1 G/DL (ref 13–17.7)
MCH RBC QN AUTO: 34.1 PG (ref 26.6–33)
MCHC RBC AUTO-ENTMCNC: 33.2 G/DL (ref 31.5–35.7)
MCV RBC AUTO: 102.7 FL (ref 79–97)
PLATELET # BLD AUTO: 196 10*3/MM3 (ref 140–450)
PMV BLD AUTO: 9.1 FL (ref 6–12)
POTASSIUM SERPL-SCNC: 4.6 MMOL/L (ref 3.5–5.2)
RBC # BLD AUTO: 2.96 10*6/MM3 (ref 4.14–5.8)
SODIUM SERPL-SCNC: 133 MMOL/L (ref 136–145)
WBC # BLD AUTO: 7.87 10*3/MM3 (ref 3.4–10.8)

## 2021-04-07 PROCEDURE — 97161 PT EVAL LOW COMPLEX 20 MIN: CPT

## 2021-04-07 PROCEDURE — 85027 COMPLETE CBC AUTOMATED: CPT | Performed by: ORTHOPAEDIC SURGERY

## 2021-04-07 PROCEDURE — 80048 BASIC METABOLIC PNL TOTAL CA: CPT | Performed by: ORTHOPAEDIC SURGERY

## 2021-04-07 PROCEDURE — 90791 PSYCH DIAGNOSTIC EVALUATION: CPT

## 2021-04-07 PROCEDURE — 25010000003 CEFAZOLIN IN DEXTROSE 2-4 GM/100ML-% SOLUTION: Performed by: ORTHOPAEDIC SURGERY

## 2021-04-07 PROCEDURE — 97110 THERAPEUTIC EXERCISES: CPT

## 2021-04-07 PROCEDURE — 25010000002 HYDROMORPHONE PER 4 MG: Performed by: ORTHOPAEDIC SURGERY

## 2021-04-07 PROCEDURE — 25010000002 LORAZEPAM PER 2 MG: Performed by: ORTHOPAEDIC SURGERY

## 2021-04-07 RX ORDER — ASPIRIN 81 MG/1
81 TABLET ORAL DAILY
Status: DISCONTINUED | OUTPATIENT
Start: 2021-04-08 | End: 2021-04-14 | Stop reason: HOSPADM

## 2021-04-07 RX ADMIN — ATORVASTATIN CALCIUM 40 MG: 20 TABLET, FILM COATED ORAL at 08:33

## 2021-04-07 RX ADMIN — HYDROMORPHONE HYDROCHLORIDE 0.5 MG: 1 INJECTION, SOLUTION INTRAMUSCULAR; INTRAVENOUS; SUBCUTANEOUS at 12:57

## 2021-04-07 RX ADMIN — SODIUM CHLORIDE, PRESERVATIVE FREE 10 ML: 5 INJECTION INTRAVENOUS at 21:54

## 2021-04-07 RX ADMIN — OXYCODONE HYDROCHLORIDE AND ACETAMINOPHEN 1 TABLET: 7.5; 325 TABLET ORAL at 10:56

## 2021-04-07 RX ADMIN — HYDROMORPHONE HYDROCHLORIDE 0.5 MG: 1 INJECTION, SOLUTION INTRAMUSCULAR; INTRAVENOUS; SUBCUTANEOUS at 00:42

## 2021-04-07 RX ADMIN — SODIUM CHLORIDE, PRESERVATIVE FREE 10 ML: 5 INJECTION INTRAVENOUS at 08:38

## 2021-04-07 RX ADMIN — FAMOTIDINE 20 MG: 20 TABLET, FILM COATED ORAL at 06:47

## 2021-04-07 RX ADMIN — TERAZOSIN HYDROCHLORIDE 10 MG: 5 CAPSULE ORAL at 21:54

## 2021-04-07 RX ADMIN — HYDROMORPHONE HYDROCHLORIDE 0.5 MG: 1 INJECTION, SOLUTION INTRAMUSCULAR; INTRAVENOUS; SUBCUTANEOUS at 08:33

## 2021-04-07 RX ADMIN — FAMOTIDINE 20 MG: 20 TABLET, FILM COATED ORAL at 18:42

## 2021-04-07 RX ADMIN — APIXABAN 2.5 MG: 2.5 TABLET, FILM COATED ORAL at 08:33

## 2021-04-07 RX ADMIN — PANTOPRAZOLE SODIUM 40 MG: 40 TABLET, DELAYED RELEASE ORAL at 06:47

## 2021-04-07 RX ADMIN — CEFAZOLIN SODIUM 2 G: 2 INJECTION, SOLUTION INTRAVENOUS at 18:42

## 2021-04-07 RX ADMIN — LORAZEPAM 1 MG: 1 TABLET ORAL at 18:42

## 2021-04-07 RX ADMIN — CARBAMAZEPINE 400 MG: 200 TABLET ORAL at 08:33

## 2021-04-07 RX ADMIN — HYDROMORPHONE HYDROCHLORIDE 0.5 MG: 1 INJECTION, SOLUTION INTRAMUSCULAR; INTRAVENOUS; SUBCUTANEOUS at 10:59

## 2021-04-07 RX ADMIN — APIXABAN 2.5 MG: 2.5 TABLET, FILM COATED ORAL at 21:54

## 2021-04-07 RX ADMIN — HYDROMORPHONE HYDROCHLORIDE 0.5 MG: 1 INJECTION, SOLUTION INTRAMUSCULAR; INTRAVENOUS; SUBCUTANEOUS at 06:05

## 2021-04-07 RX ADMIN — OXYCODONE HYDROCHLORIDE AND ACETAMINOPHEN 1 TABLET: 7.5; 325 TABLET ORAL at 06:48

## 2021-04-07 RX ADMIN — HYDROMORPHONE HYDROCHLORIDE 0.5 MG: 1 INJECTION, SOLUTION INTRAMUSCULAR; INTRAVENOUS; SUBCUTANEOUS at 15:34

## 2021-04-07 RX ADMIN — OXYCODONE HYDROCHLORIDE AND ACETAMINOPHEN 1 TABLET: 7.5; 325 TABLET ORAL at 00:40

## 2021-04-07 RX ADMIN — CEFAZOLIN SODIUM 2 G: 2 INJECTION, SOLUTION INTRAVENOUS at 08:33

## 2021-04-07 RX ADMIN — OXYCODONE HYDROCHLORIDE AND ACETAMINOPHEN 1 TABLET: 7.5; 325 TABLET ORAL at 21:55

## 2021-04-07 RX ADMIN — Medication 1000 MCG: at 08:33

## 2021-04-07 RX ADMIN — LORAZEPAM 1 MG: 2 INJECTION INTRAMUSCULAR; INTRAVENOUS at 12:39

## 2021-04-07 RX ADMIN — OXYCODONE HYDROCHLORIDE AND ACETAMINOPHEN 1 TABLET: 7.5; 325 TABLET ORAL at 15:34

## 2021-04-07 RX ADMIN — MODAFINIL 100 MG: 100 TABLET ORAL at 08:33

## 2021-04-07 RX ADMIN — CEFAZOLIN SODIUM 2 G: 2 INJECTION, SOLUTION INTRAVENOUS at 01:48

## 2021-04-07 RX ADMIN — FLUTICASONE PROPIONATE 2 SPRAY: 50 SPRAY, METERED NASAL at 08:37

## 2021-04-07 NOTE — PROGRESS NOTES
Name: Regan Cavazos ADMIT: 4/3/2021   : 1945  PCP: Latricia Schulz APRN    MRN: [id number] LOS: 4 days   AGE/SEX: 76 y.o. male  ROOM: [facility]     Subjective   Subjective   Disoriented.  He knows that he is in hospital he was trying to open up his bedside remote.    Review of Systems   Unable to obtain review of system secondary to altered mental status/disorientation  Objective   Objective   Vital Signs  Temp:  [97.8 °F (36.6 °C)-99.8 °F (37.7 °C)] 99.8 °F (37.7 °C)  Heart Rate:  [57-82] 81  Resp:  [16-20] 20  BP: (117-194)/() 117/72  SpO2:  [91 %-100 %] 99 %  on  Flow (L/min):  [2-4] 2;   Device (Oxygen Therapy): nasal cannula  Body mass index is 27.93 kg/m².  Physical Exam  Constitutional:       General: He is not in acute distress.     Comments: Chronically ill appearing   HENT:      Head: Normocephalic and atraumatic.   Cardiovascular:      Rate and Rhythm: Normal rate and regular rhythm.   Pulmonary:      Effort: Pulmonary effort is normal.      Breath sounds: Normal breath sounds.   Abdominal:      General: There is no distension.      Palpations: Abdomen is soft.      Tenderness: There is no abdominal tenderness.   Skin:     General: Skin is warm and dry.   Neurological:      Mental Status: He is disoriented.         Results Review     I reviewed the patient's new clinical results.  Results from last 7 days   Lab Units 21  0651 21  0640 21  0521  0801   WBC 10*3/mm3 7.87 5.18 5.28 4.95   HEMOGLOBIN g/dL 10.1* 11.1* 11.8* 12.1*   PLATELETS 10*3/mm3 196 164 169 180     Results from last 7 days   Lab Units 21  0651 21  0640 21  0521 21  0801   SODIUM mmol/L 133* 137 135* 135*   POTASSIUM mmol/L 4.6 4.4 4.4 4.4   CHLORIDE mmol/L 99 100 100 99   CO2 mmol/L 25.4 29.7* 29.0 27.8   BUN mg/dL 11 11 14 12   CREATININE mg/dL 0.76 0.75* 0.98 0.93   GLUCOSE mg/dL 124* 85 92 91   Estimated Creatinine Clearance: 98.9 mL/min (by C-G formula based on SCr  of 0.76 mg/dL).  Results from last 7 days   Lab Units 04/03/21  1644   ALBUMIN g/dL 3.60   BILIRUBIN mg/dL 0.6   ALK PHOS U/L 50   AST (SGOT) U/L 18   ALT (SGPT) U/L 10     Results from last 7 days   Lab Units 04/07/21  0651 04/06/21  0640 04/05/21  0521 04/04/21  0801 04/03/21  1644   CALCIUM mg/dL 8.4* 8.3* 8.2* 8.2* 8.0*   ALBUMIN g/dL  --   --   --   --  3.60       COVID19   Date Value Ref Range Status   04/06/2021 Not Detected Not Detected - Ref. Range Final   04/03/2021 Not Detected Not Detected - Ref. Range Final     No results found for: HGBA1C, POCGLU    XR Hip With or Without Pelvis 1 View Right  Narrative: AP VIEW OF THE PELVIS PLUS 2 VIEWS RIGHT HIP     HISTORY: Postop     COMPARISON: 04/05/2021.      FINDINGS:  The patient has undergone fixation of the previously identified  periprosthetic fracture involving the proximal right femur. There is a  transversely oriented lucency seen involving the proximal femoral  diaphysis along the more distal aspect of the hardware which wasn't  clearly seen on the earlier images. It is involving the lateral cortex  of the right femur. Additional fracture is not excluded. There is soft  tissue swelling and subcutaneous gas overlying the right lateral thigh.  Right hip arthroplasty remains in place. No fracture is seen on the  left. There are extensive postsurgical changes involving the lumbar  spine. Morataya catheter is present.     Impression: Postoperative changes, as noted above.     This report was finalized on 4/6/2021 10:13 PM by Dr. Marylou Kilpatrick M.D.       Scheduled Medications  apixaban, 2.5 mg, Oral, Q12H  atorvastatin, 40 mg, Oral, Daily  carBAMazepine, 400 mg, Oral, BID  ceFAZolin, 2 g, Intravenous, Q8H  famotidine, 20 mg, Oral, BID AC  fexofenadine, 180 mg, Oral, Daily  fluticasone, 2 spray, Each Nare, Daily  modafinil, 200 mg, Oral, Daily  pantoprazole, 40 mg, Oral, QAM  sodium chloride, 10 mL, Intravenous, Q12H  terazosin, 10 mg, Oral, Nightly  vitamin  B-12, 1,000 mcg, Oral, Daily    Infusions  lactated ringers, 9 mL/hr, Last Rate: 9 mL/hr (04/06/21 1925)    Diet  Diet Regular       Assessment/Plan     Active Hospital Problems    Diagnosis  POA   • **Broken internal right hip prosthesis (CMS/[facility]) [T84.010A]  Yes   • Closed right hip fracture, initial encounter (CMS/[facility]) [S72.001A]  Yes   • GERD (gastroesophageal reflux disease) [K21.9]  Yes   • Seizures (CMS/[facility]) [R56.9]  Yes   • BPH (benign prostatic hyperplasia) [N40.0]  Yes   • Coronary artery disease [I25.10]  Yes      Resolved Hospital Problems   No resolved problems to display.       76 y.o. male admitted with Broken internal right hip prosthesis (CMS/[facility]).       Right hip prosthesis fracture-ortho is consult.  Underwent total hip arthroplasty revision on 4/6  DVT prophylaxis with Eliquis 2.5 twice daily for 30 days  Resume ASA     Alcohol dependence with intoxication-  monitor CIWA, thiamine, folate, mvi. Ativan for withdrawal.     Allergic rhinitis-he takes Allegra at home which we do not have on formulary here, and he is allergic to our formulary second-generation antihistamines.  I do not want to give him a first generation antihistamine because of his confusion.  It will not provide immediate relief, but will start fluticasone nasal spray today.     Left shoulder pain-x-ray shows chronic fracture of the inferior aspect of the left scapula, old left clavicle fracture, and multiple old left sided rib fractures, but no acute fracture or subluxation of the left shoulder is seen.        CAD s/p CABG in 2016  Carotid artery stenosis  DDD  OA  GERD  TBI with seizure disorder  Anxiety/depression  History of prostate cancer s/p xrt  ADHD  HLD  BPH     [facility] medication [facility] is list below:    Home medications: asa 81, carbamazepine 400 bid, cyanocobalamin 1000 daily, diclofenac 1% topical gel, famotidine 20 mg bid, fexofenadine 180 daily, modafinil 200  daily, mvi daily, pantoprazole 40 mg daily, simvastatin 40 mg daily, terazosin 10 mg daily.      · Eliquis 2.5mg BID  for DVT prophylaxis.  · Full code.  · Discussed with patient and nursing staff.  · Anticipate discharge to rehab, but timing to be determined    Mina Gomez MD  [facility]  04/07/21  14:33 EDT     I wore protective equipment throughout this patient encounter including a face mask, gloves and protective eyewear.  Hand hygiene was performed before donning protective equipment and after removal when leaving the room.

## 2021-04-07 NOTE — CONSULTS
"Pt is a 76-year-old  male seen at bedside for [facility] consult re: alcohol use. All information per pt report unless otherwise noted.  Patient sleeping on approach easily awakened to voice. Oriented to self, place, year but confused as to reason for being in hospital stating \"I am here for my wife I think\".  Patient then states he and wife are in middle of divorce.  Patient denies depression anxiety or history of trauma. Denies suicidal homicidal ideation.  Asked about alcohol use patient reports he has \"a drink\" daily.  States he drinks scotch.  Report contradicts chat notes and prior statements by patient regarding type and quantity of alcohol consumption. He has no concerns about his drinking and states others have not expressed concerns. States no history of attendance at AA, alcohol use disorder treatment, withdrawal or blackouts. Patient then contradicts himself stating \"I've been in withdrawals\" but cannot recall last episode. Patient says he is not currently experiencing any symptoms of withdrawal.  States no tobacco or recreational drug use. Patient declines resources for substance use treatment.     Patient reviewed with nurse who reports confusion, but able to comply with directions.  Patient is receiving IV narcotics for postoperative pain.  Axis will sign off.  If patient requests information at a later time please reconsult.            "

## 2021-04-07 NOTE — PROGRESS NOTES
Orthopaedic Surgery   Daily Progress Note  Dr. ANALI Monteiro II  [phone]  DEMOGRAPHICS:   · Regan Cavazos   · Age:76 y.o.   · MRN:[id number]  · Admitted: 4/3/2021    PROCEDURE: 1 Day Post-Op s/p Procedure(s):  TOTAL HIP ARTHROPLASTY REVISION     HOSPITAL PROGRESS  · Patient Issues: Patient quite confused this morning.  Unaware that he had surgery yesterday.  Still on CIWA protocol  · Ambulation/Activity: Minimal activity since surgery     VITALS:  Vitals:    04/06/21 2230 04/06/21 2245 04/06/21 2257 04/07/21 0401   BP: (!) 189/94 140/73 131/72 123/65   BP Location: Right arm Right arm Right arm Left arm   Patient Position: Lying Lying Lying Lying   Pulse: 65 64 70 74   Resp: 16 16 16 20   Temp: 98 °F (36.7 °C)  98.2 °F (36.8 °C) 99.3 °F (37.4 °C)   TempSrc: Oral  Oral Oral   SpO2: 100% 100% 100% 97%   Weight:       Height:           PHYSICAL EXAM:  · LUNGS: Equal chest rise, no shortness of air  · CARDIOVASCULAR: brisk capillary refill intact  · WOUND: Fer with moderate drainage  · EXTREMITY: Operative Leg  · Pulses: brisk capillary refill intact  · Sensation: Sensation intact to light touch to the saphenous/sural/tibial/deep peroneal/superficial peroneal nerves, and grossly throughout the extremity.  · Motor: 5/5 EHL/FHL/TA/GS motor complexes    LABS:   Lab Results   Component Value Date    HGB 10.1 (L) 04/07/2021     Lab Results   Component Value Date    WBC 7.87 04/07/2021     Lab Results   Component Value Date    GLUCOSE 85 04/06/2021    CALCIUM 8.3 (L) 04/06/2021     04/06/2021    K 4.4 04/06/2021    CO2 29.7 (H) 04/06/2021     04/06/2021    BUN 11 04/06/2021    CREATININE 0.75 (L) 04/06/2021    EGFRIFNONA 102 04/06/2021    BCR 14.7 04/06/2021    ANIONGAP 7.3 04/06/2021       ASSESSMENT: Patient is a 76 y.o. male who is 1 Day Post-Op s/p Procedure(s):  TOTAL HIP ARTHROPLASTY REVISION     PLAN:   · Weight Bearing: Partial Weight Bearing, 50%  · Labs: Above lab values review. Plan: No  "intervention necessary at this time.   · PT/OT: To Mobilize  · DVT PPX: Eliquis 2.5mg BID for 30 days  · Post-Op Xray: JANET implants in good position.   · Follow-Up: In office at 3 weeks postop  · Dispo: Pending progression with therapy, very likely will need rehab    R \"Garry\" Cayetano WIGGINS MD  Orthopaedic Surgery  [facility]  [phone] - [place] Office  [phone] - [place] Office      "
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
## 2021-04-07 NOTE — PLAN OF CARE
Goal Outcome Evaluation:  Plan of Care Reviewed With: patient  Progress: improving  Outcome Summary: Posey vest restraint discontinued shortly after arrival from PACU after total right hip revision. Patient has been disoriented, but alert to self. IV fluids running. Pain controlled with current regimen. 2LNCO2 overnight. [place] remains intact.

## 2021-04-07 NOTE — ANESTHESIA POSTPROCEDURE EVALUATION
"Patient: Regan Cavazos    Procedure Summary     Date: 04/06/21 Room / Location: [facility] OR [address] [place] [address] MAIN OR    Anesthesia Start: 1859 Anesthesia Stop: 2112    Procedure: TOTAL HIP ARTHROPLASTY REVISION (Right Hip) Diagnosis:       Closed right hip fracture, initial encounter (CMS/[facility])      (Closed right hip fracture, initial encounter (CMS/[facility]) [S72.001A])    Surgeons: Lex Monteiro II, MD Provider: Hector Box MD    Anesthesia Type: general ASA Status: 3          Anesthesia Type: general    Vitals  Vitals Value Taken Time   /94 04/06/21 2230   Temp 36.8 °C (98.3 °F) 04/06/21 2106   Pulse 65 04/06/21 2230   Resp 16 04/06/21 2230   SpO2 100 % 04/06/21 2230           Post Anesthesia Care and Evaluation    Patient location during evaluation: PACU  Patient participation: complete - patient participated  Level of consciousness: awake  Pain management: adequate  Airway patency: patent  Anesthetic complications: No anesthetic complications    Cardiovascular status: acceptable  Respiratory status: acceptable  Hydration status: acceptable    Comments: BP (!) 189/94 (BP Location: Right arm, Patient Position: Lying)   Pulse 65   Temp 36.8 °C (98.3 °F) (Oral)   Resp 16   Ht 185.4 cm (72.99\")   Wt 96 kg (211 lb 10.3 oz)   SpO2 100%   BMI 27.93 kg/m²         "

## 2021-04-07 NOTE — PROGRESS NOTES
Continued Stay Note  [facility]     Patient Name: Regan Cavazos  MRN: [id number]  Today's Date: 4/7/2021    Admit Date: 4/3/2021    Discharge Plan     Row Name 04/07/21 1355       Plan    Plan  Disposition?    Patient/Family in Agreement with Plan  unable to assess    Plan Comments  Patient confused at this time. S/P Right total hip arthroplasty revision POD #1. Need to f/u regarding SNF selection. Continue to follow.        Discharge Codes    No documentation.             Audra House RN

## 2021-04-07 NOTE — PLAN OF CARE
Goal Outcome Evaluation:  Plan of Care Reviewed With: patient  Progress: improving  Outcome Summary: Patient is a 75 yo male who is POD1 from R total hip revision posterior due to R broken internal hip prosthesis. The patient is 50% WB on RLE and posterior hip precautions. The patient is very lethargic this date and difficulty to obtain PLOF. The patient states he lives alone and has 1 step to enter home. He states he has a rwx. The patient presents today with a decreased balance, strength, ROM, activity tolerance, and increased pain. The patient completed supine<>sitting with maxAx2 and static/dynamic sitting EOB with min-modA. The patient will likely benefit from skilled PT to increase level of indpendence. Anticipate discharge to SNF.Patient was not wearing a face mask during this therapy encounter. Therapist used appropriate personal protective equipment including eye protection, mask, and gloves.  Mask used was standard procedure mask. Appropriate PPE was worn during the entire therapy session. Hand hygiene was completed before and after therapy session. Patient is not in enhanced droplet precautions.  Gianna present.

## 2021-04-08 LAB
ANION GAP SERPL CALCULATED.3IONS-SCNC: 9.8 MMOL/L (ref 5–15)
BUN SERPL-MCNC: 13 MG/DL (ref 8–23)
BUN/CREAT SERPL: 14.8 (ref 7–25)
CALCIUM SPEC-SCNC: 8.3 MG/DL (ref 8.6–10.5)
CHLORIDE SERPL-SCNC: 99 MMOL/L (ref 98–107)
CO2 SERPL-SCNC: 28.2 MMOL/L (ref 22–29)
CREAT SERPL-MCNC: 0.88 MG/DL (ref 0.76–1.27)
DEPRECATED RDW RBC AUTO: 43.5 FL (ref 37–54)
ERYTHROCYTE [DISTWIDTH] IN BLOOD BY AUTOMATED COUNT: 11.6 % (ref 12.3–15.4)
GFR SERPL CREATININE-BSD FRML MDRD: 85 ML/MIN/1.73
GLUCOSE SERPL-MCNC: 96 MG/DL (ref 65–99)
HCT VFR BLD AUTO: 27.8 % (ref 37.5–51)
HGB BLD-MCNC: 9.5 G/DL (ref 13–17.7)
MCH RBC QN AUTO: 35.2 PG (ref 26.6–33)
MCHC RBC AUTO-ENTMCNC: 34.2 G/DL (ref 31.5–35.7)
MCV RBC AUTO: 103 FL (ref 79–97)
PLATELET # BLD AUTO: 188 10*3/MM3 (ref 140–450)
PMV BLD AUTO: 9.1 FL (ref 6–12)
POTASSIUM SERPL-SCNC: 4.5 MMOL/L (ref 3.5–5.2)
RBC # BLD AUTO: 2.7 10*6/MM3 (ref 4.14–5.8)
SODIUM SERPL-SCNC: 137 MMOL/L (ref 136–145)
WBC # BLD AUTO: 6.41 10*3/MM3 (ref 3.4–10.8)

## 2021-04-08 PROCEDURE — 25010000002 LORAZEPAM PER 2 MG: Performed by: ORTHOPAEDIC SURGERY

## 2021-04-08 PROCEDURE — 80048 BASIC METABOLIC PNL TOTAL CA: CPT | Performed by: ORTHOPAEDIC SURGERY

## 2021-04-08 PROCEDURE — 85027 COMPLETE CBC AUTOMATED: CPT | Performed by: ORTHOPAEDIC SURGERY

## 2021-04-08 PROCEDURE — 97110 THERAPEUTIC EXERCISES: CPT

## 2021-04-08 RX ADMIN — Medication 1000 MCG: at 09:30

## 2021-04-08 RX ADMIN — LORAZEPAM 1 MG: 1 TABLET ORAL at 20:45

## 2021-04-08 RX ADMIN — APIXABAN 2.5 MG: 2.5 TABLET, FILM COATED ORAL at 09:30

## 2021-04-08 RX ADMIN — SODIUM CHLORIDE, PRESERVATIVE FREE 10 ML: 5 INJECTION INTRAVENOUS at 09:30

## 2021-04-08 RX ADMIN — FAMOTIDINE 20 MG: 20 TABLET, FILM COATED ORAL at 17:04

## 2021-04-08 RX ADMIN — FAMOTIDINE 20 MG: 20 TABLET, FILM COATED ORAL at 09:30

## 2021-04-08 RX ADMIN — APIXABAN 2.5 MG: 2.5 TABLET, FILM COATED ORAL at 20:45

## 2021-04-08 RX ADMIN — PANTOPRAZOLE SODIUM 40 MG: 40 TABLET, DELAYED RELEASE ORAL at 05:27

## 2021-04-08 RX ADMIN — OXYCODONE HYDROCHLORIDE AND ACETAMINOPHEN 1 TABLET: 7.5; 325 TABLET ORAL at 05:26

## 2021-04-08 RX ADMIN — FLUTICASONE PROPIONATE 2 SPRAY: 50 SPRAY, METERED NASAL at 09:30

## 2021-04-08 RX ADMIN — CARBAMAZEPINE 400 MG: 200 TABLET ORAL at 09:30

## 2021-04-08 RX ADMIN — MODAFINIL 200 MG: 100 TABLET ORAL at 09:30

## 2021-04-08 RX ADMIN — OXYCODONE HYDROCHLORIDE AND ACETAMINOPHEN 1 TABLET: 7.5; 325 TABLET ORAL at 20:47

## 2021-04-08 RX ADMIN — OXYCODONE HYDROCHLORIDE AND ACETAMINOPHEN 1 TABLET: 7.5; 325 TABLET ORAL at 13:47

## 2021-04-08 RX ADMIN — LORAZEPAM 0.5 MG: 0.5 TABLET ORAL at 01:28

## 2021-04-08 RX ADMIN — CARBAMAZEPINE 400 MG: 200 TABLET ORAL at 01:15

## 2021-04-08 RX ADMIN — LORAZEPAM 1 MG: 2 INJECTION INTRAMUSCULAR; INTRAVENOUS at 21:54

## 2021-04-08 RX ADMIN — ASPIRIN 81 MG: 81 TABLET, COATED ORAL at 09:30

## 2021-04-08 RX ADMIN — TERAZOSIN HYDROCHLORIDE 10 MG: 5 CAPSULE ORAL at 20:45

## 2021-04-08 RX ADMIN — CARBAMAZEPINE 400 MG: 200 TABLET ORAL at 20:45

## 2021-04-08 RX ADMIN — ATORVASTATIN CALCIUM 40 MG: 20 TABLET, FILM COATED ORAL at 09:30

## 2021-04-08 RX ADMIN — OXYCODONE HYDROCHLORIDE AND ACETAMINOPHEN 1 TABLET: 7.5; 325 TABLET ORAL at 09:30

## 2021-04-08 RX ADMIN — OXYCODONE HYDROCHLORIDE AND ACETAMINOPHEN 1 TABLET: 7.5; 325 TABLET ORAL at 01:24

## 2021-04-08 NOTE — PROGRESS NOTES
Orthopaedic Surgery   Daily Progress Note  Dr. ANALI Monteiro II  [phone]  DEMOGRAPHICS:   · Regan Cavazos   · Age:76 y.o.   · MRN:[id number]  · Admitted: 4/3/2021    PROCEDURE: 2 Days Post-Op s/p Procedure(s):  TOTAL HIP ARTHROPLASTY REVISION     HOSPITAL PROGRESS  · Patient Issues: Continues to be lethargic and confused in the morning  · Ambulation/Activity: Minimal progress with therapy    VITALS:  Vitals:    04/07/21 2332 04/08/21 0115 04/08/21 0236 04/08/21 0652   BP: 114/64  116/64 128/72   BP Location: Left arm  Left arm Left arm   Patient Position: Lying  Lying Lying   Pulse: 84 78 72 76   Resp: 16  16 16   Temp: 98.6 °F (37 °C)  98 °F (36.7 °C) 99.3 °F (37.4 °C)   TempSrc: Axillary  Axillary Axillary   SpO2: 95% 95% 94% 94%   Weight:       Height:           PHYSICAL EXAM:  · LUNGS: Equal chest rise, no shortness of air  · CARDIOVASCULAR: brisk capillary refill intact  · WOUND: Fer with moderate drainage  · EXTREMITY: Operative Leg  · Pulses: brisk capillary refill intact  · Sensation: Sensation intact to light touch to the saphenous/sural/tibial/deep peroneal/superficial peroneal nerves, and grossly throughout the extremity.  · Motor: 5/5 EHL/FHL/TA/GS motor complexes    LABS:   Lab Results   Component Value Date    HGB 9.5 (L) 04/08/2021     Lab Results   Component Value Date    WBC 6.41 04/08/2021     Lab Results   Component Value Date    GLUCOSE 96 04/08/2021    CALCIUM 8.3 (L) 04/08/2021     04/08/2021    K 4.5 04/08/2021    CO2 28.2 04/08/2021    CL 99 04/08/2021    BUN 13 04/08/2021    CREATININE 0.88 04/08/2021    EGFRIFNONA 85 04/08/2021    BCR 14.8 04/08/2021    ANIONGAP 9.8 04/08/2021       ASSESSMENT: Patient is a 76 y.o. male who is 2 Days Post-Op s/p Procedure(s):  TOTAL HIP ARTHROPLASTY REVISION     PLAN:   · Weight Bearing: Partial Weight Bearing, 50%  · Labs: Above lab values review. Plan: No intervention necessary at this time.   · PT/OT: To Mobilize  · DVT PPX: Eliquis 2.5mg  "BID for 30 days  · Post-Op Xray: JANET implants in good position.   · Follow-Up: In office at 3 weeks postop  · Dispo: Rehab when medically appropriate    R \"Garry\" Cayetano WIGGINS MD  Orthopaedic Surgery  [facility]  [phone] - [place] Office  [phone] - [place] Office      "

## 2021-04-08 NOTE — PLAN OF CARE
Goal Outcome Evaluation:  Plan of Care Reviewed With: patient     Outcome Summary: Pt trying to get OOB upon entry but was alarming from bed, pt req multiple cues for same task, did manage to get tfer bed to chair w/max assist but assist req to maintain PWB R; will need SNU at DC    Patient was wearing a face mask during this therapy encounter. Therapist used appropriate personal protective equipment including eye protection, mask, and gloves.  Mask used was standard procedure mask. Appropriate PPE was worn during the entire therapy session. Hand hygiene was completed before and after therapy session. Patient is not in enhanced droplet precautions.

## 2021-04-08 NOTE — PROGRESS NOTES
"Continued Stay Note  [facility]     Patient Name: Regan Cavazos  MRN: [id number]  Today's Date: 4/8/2021    Admit Date: 4/3/2021    Discharge Plan     Row Name 04/08/21 1607       Plan    Plan Comments  Spoke with pt regarding SNF choices, pt was confused and telling old stories. Asked if I could speak with his partner listed as emergency contact and he said \"no\". Will attempt to screen again tomorrow, hopefully less confused.        Discharge Codes    No documentation.             Cari Alnaiz RN    "
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
## 2021-04-08 NOTE — PLAN OF CARE
Patient alert x1-3. CIWA protocol. PRN percocet to manage pain. Straight cath x1 due to urinary retention post bailey removal 4/7 AM. KEVEN dressing removed and replaced with island dressing, currently CDI. Will continue to monitor.    Goal Outcome Evaluation:           Problem: Pain Acute  Goal: Optimal Pain Control  Outcome: Ongoing, Progressing  Intervention: Develop Pain Management Plan  Recent Flowsheet Documentation  Taken 4/8/2021 0124 by Kayleigh Smith, RN  Pain Management Interventions: see MAR  Taken 4/7/2021 2155 by Kayleigh Smith RN  Pain Management Interventions:   see MAR   pillow support provided   position adjusted  Intervention: Prevent or Manage Pain  Recent Flowsheet Documentation  Taken 4/7/2021 2005 by Kayleigh Smith, RN  Sensory Stimulation Regulation:   auditory stimulation minimized   care clustered   lighting decreased   quiet environment promoted   visual stimulation minimized  Sleep/Rest Enhancement:   awakenings minimized   relaxation techniques promoted   room darkened  Intervention: Optimize Psychosocial Wellbeing  Recent Flowsheet Documentation  Taken 4/7/2021 2005 by Kayleigh Smith, RN  Diversional Activities: smartphone

## 2021-04-09 LAB
ANION GAP SERPL CALCULATED.3IONS-SCNC: 7.3 MMOL/L (ref 5–15)
BUN SERPL-MCNC: 15 MG/DL (ref 8–23)
BUN/CREAT SERPL: 16.9 (ref 7–25)
CALCIUM SPEC-SCNC: 8.3 MG/DL (ref 8.6–10.5)
CHLORIDE SERPL-SCNC: 99 MMOL/L (ref 98–107)
CO2 SERPL-SCNC: 27.7 MMOL/L (ref 22–29)
CREAT SERPL-MCNC: 0.89 MG/DL (ref 0.76–1.27)
DEPRECATED RDW RBC AUTO: 43.6 FL (ref 37–54)
ERYTHROCYTE [DISTWIDTH] IN BLOOD BY AUTOMATED COUNT: 11.8 % (ref 12.3–15.4)
GFR SERPL CREATININE-BSD FRML MDRD: 84 ML/MIN/1.73
GLUCOSE SERPL-MCNC: 81 MG/DL (ref 65–99)
HCT VFR BLD AUTO: 24.3 % (ref 37.5–51)
HGB BLD-MCNC: 8.3 G/DL (ref 13–17.7)
MCH RBC QN AUTO: 35 PG (ref 26.6–33)
MCHC RBC AUTO-ENTMCNC: 34.2 G/DL (ref 31.5–35.7)
MCV RBC AUTO: 102.5 FL (ref 79–97)
PLATELET # BLD AUTO: 203 10*3/MM3 (ref 140–450)
PMV BLD AUTO: 9.1 FL (ref 6–12)
POTASSIUM SERPL-SCNC: 4.2 MMOL/L (ref 3.5–5.2)
RBC # BLD AUTO: 2.37 10*6/MM3 (ref 4.14–5.8)
SODIUM SERPL-SCNC: 134 MMOL/L (ref 136–145)
WBC # BLD AUTO: 6.11 10*3/MM3 (ref 3.4–10.8)

## 2021-04-09 PROCEDURE — 85027 COMPLETE CBC AUTOMATED: CPT | Performed by: ORTHOPAEDIC SURGERY

## 2021-04-09 PROCEDURE — 97110 THERAPEUTIC EXERCISES: CPT

## 2021-04-09 PROCEDURE — 25010000002 LORAZEPAM PER 2 MG: Performed by: ORTHOPAEDIC SURGERY

## 2021-04-09 PROCEDURE — 80048 BASIC METABOLIC PNL TOTAL CA: CPT | Performed by: ORTHOPAEDIC SURGERY

## 2021-04-09 RX ADMIN — LORAZEPAM 1 MG: 2 INJECTION INTRAMUSCULAR; INTRAVENOUS at 15:32

## 2021-04-09 RX ADMIN — SODIUM CHLORIDE, PRESERVATIVE FREE 10 ML: 5 INJECTION INTRAVENOUS at 08:06

## 2021-04-09 RX ADMIN — APIXABAN 2.5 MG: 2.5 TABLET, FILM COATED ORAL at 23:02

## 2021-04-09 RX ADMIN — CARBAMAZEPINE 400 MG: 200 TABLET ORAL at 20:59

## 2021-04-09 RX ADMIN — CARBAMAZEPINE 400 MG: 200 TABLET ORAL at 08:04

## 2021-04-09 RX ADMIN — Medication 1000 MCG: at 08:05

## 2021-04-09 RX ADMIN — ASPIRIN 81 MG: 81 TABLET, COATED ORAL at 08:04

## 2021-04-09 RX ADMIN — MODAFINIL 200 MG: 100 TABLET ORAL at 08:10

## 2021-04-09 RX ADMIN — LORAZEPAM 1 MG: 2 INJECTION INTRAMUSCULAR; INTRAVENOUS at 08:05

## 2021-04-09 RX ADMIN — FLUTICASONE PROPIONATE 2 SPRAY: 50 SPRAY, METERED NASAL at 08:05

## 2021-04-09 RX ADMIN — LORAZEPAM 1 MG: 2 INJECTION INTRAMUSCULAR; INTRAVENOUS at 13:43

## 2021-04-09 RX ADMIN — LORAZEPAM 1 MG: 2 INJECTION INTRAMUSCULAR; INTRAVENOUS at 20:59

## 2021-04-09 RX ADMIN — TERAZOSIN HYDROCHLORIDE 10 MG: 5 CAPSULE ORAL at 23:14

## 2021-04-09 RX ADMIN — PANTOPRAZOLE SODIUM 40 MG: 40 TABLET, DELAYED RELEASE ORAL at 06:17

## 2021-04-09 RX ADMIN — SODIUM CHLORIDE, PRESERVATIVE FREE 10 ML: 5 INJECTION INTRAVENOUS at 20:59

## 2021-04-09 RX ADMIN — ATORVASTATIN CALCIUM 40 MG: 20 TABLET, FILM COATED ORAL at 08:04

## 2021-04-09 RX ADMIN — APIXABAN 2.5 MG: 2.5 TABLET, FILM COATED ORAL at 08:05

## 2021-04-09 RX ADMIN — FAMOTIDINE 20 MG: 20 TABLET, FILM COATED ORAL at 08:04

## 2021-04-09 NOTE — PLAN OF CARE
See below.    Problem: Adult Inpatient Plan of Care  Goal: Plan of Care Review  Outcome: Ongoing, Progressing  Flowsheets (Taken 4/9/2021 2016)  Progress: improving  Plan of Care Reviewed With: patient  Outcome Summary: 76/M POD#3 right JANET revision.  ALOx self only, RA, lungs clear but diminished, BS hypoactive, brief in place r/t incontinence.  Up x2 MAX EOB.  2+ pedal pulses noted bilaterally, no c/o numbness/tingling in operative extremity, dressing CDI.  Pain well-controlled with no need for PRN pain meds only.  PIV saline locked.  D/C planning in progress, will CTM.

## 2021-04-09 NOTE — PROGRESS NOTES
Orthopaedic Surgery   Daily Progress Note  Dr. ANALI Monteiro II  [phone]  DEMOGRAPHICS:   · Regan Cavazos   · Age:76 y.o.   · MRN:[id number]  · Admitted: 4/3/2021    PROCEDURE: 3 Days Post-Op s/p Procedure(s):  TOTAL HIP ARTHROPLASTY REVISION     HOSPITAL PROGRESS  · Patient Issues: Confusion and lethargy continues  · Ambulation/Activity: Minimal    VITALS:  Vitals:    04/08/21 1832 04/08/21 2204 04/09/21 0207 04/09/21 0657   BP: 111/67 119/62 100/62 105/60   BP Location: Left arm Left arm Left arm Left arm   Patient Position: Lying Lying Lying Lying   Pulse: 90 92 80 76   Resp: 18 16 16 16   Temp: 98.9 °F (37.2 °C) 98.9 °F (37.2 °C) 98 °F (36.7 °C) 98.5 °F (36.9 °C)   TempSrc: Skin Oral Oral Skin   SpO2: 97% 93% 97% 97%   Weight:       Height:           PHYSICAL EXAM:  · LUNGS: Equal chest rise, no shortness of air  · CARDIOVASCULAR: brisk capillary refill intact  · WOUND: Dressings intact  · EXTREMITY: Operative Leg  · Pulses: brisk capillary refill intact  · Sensation: Sensation intact to light touch to the saphenous/sural/tibial/deep peroneal/superficial peroneal nerves, and grossly throughout the extremity.  · Motor: 5/5 EHL/FHL/TA/GS motor complexes    LABS:   Lab Results   Component Value Date    HGB 8.3 (L) 04/09/2021     Lab Results   Component Value Date    WBC 6.11 04/09/2021     Lab Results   Component Value Date    GLUCOSE 81 04/09/2021    CALCIUM 8.3 (L) 04/09/2021     (L) 04/09/2021    K 4.2 04/09/2021    CO2 27.7 04/09/2021    CL 99 04/09/2021    BUN 15 04/09/2021    CREATININE 0.89 04/09/2021    EGFRIFNONA 84 04/09/2021    BCR 16.9 04/09/2021    ANIONGAP 7.3 04/09/2021       ASSESSMENT: Patient is a 76 y.o. male who is 3 Days Post-Op s/p Procedure(s):  TOTAL HIP ARTHROPLASTY REVISION     PLAN:   · Weight Bearing: Partial Weight Bearing, 50%  · Labs: Above lab values review. Plan: No intervention necessary at this time.   · PT/OT: To Mobilize  · DVT PPX: Eliquis 2.5mg BID for 30  "days  · Post-Op Xray: JANET implants in good position.   · Follow-Up: In office at 3 weeks postop  · Dispo: Rehab when medically appropriate    R \"Garry\" Cayetano WIGGINS MD  Orthopaedic Surgery  [facility]  [phone] - [place] Office  [phone] - [place] Office      "

## 2021-04-09 NOTE — PLAN OF CARE
Goal Outcome Evaluation:  Plan of Care Reviewed With: patient  Progress: declining  Outcome Summary: pt too lethargic to participate, then when began PROM for hip exer R , pt began resisting    Patient was not wearing a face mask during this therapy encounter. Therapist used appropriate personal protective equipment including eye protection, mask, and gloves.  Mask used was standard procedure mask. Appropriate PPE was worn during the entire therapy session. Hand hygiene was completed before and after therapy session. Patient is not in enhanced droplet precautions.
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
## 2021-04-09 NOTE — PROGRESS NOTES
Continued Stay Note  [facility]     Patient Name: Regan Cavazos  MRN: [id number]  Today's Date: 4/9/2021    Admit Date: 4/3/2021    Discharge Plan     Row Name 04/09/21 1529       Plan    Plan Comments  Pt is not alert and oriented when CSW went into room. Pt would not answer questions. Spoke with pt’s wife Robyn via phone [phone]. She reports she is the only family pt has and they are in the process of getting a divorce. She reports she is willing to sign any papers and nursing home and assist with d/c plan if necessary. She states pt is not normally confused and this is not his baseline. She reports no other family to care for pt. She is agreeable to SNF at d/c. Explained that CSW will need to look at all facilities in [facility] in case he needs Medicaid established at some point as he only has Medicare part A. She is agreeable to any referral made. SNF referrals made. Spoke with Olivier at [facility] who will look at all his facilities for pt, [facility] has [facility] contract if pt is eligible. CCP to follow up with referrals made.    Row Name 04/09/21 1524       Plan    Plan  Pt is still confused; SNF referrals pending        Discharge Codes    No documentation.             CHELA Goyal

## 2021-04-09 NOTE — PROGRESS NOTES
Name: Regan Cavazos ADMIT: 4/3/2021   : 1945  PCP: Latricia Schulz APRN    MRN: [id number] LOS: 6 days   AGE/SEX: 76 y.o. male  ROOM: [facility]     Subjective   Subjective   Agitated overnight.  Requiring Ativan for alcohol withdrawal.  Sleeping during the time of my interview.    Review of Systems   Constitutional: Negative.    Respiratory: Negative.    Cardiovascular: Negative.    Gastrointestinal: Negative.         Objective   Objective   Vital Signs  Temp:  [98 °F (36.7 °C)-98.9 °F (37.2 °C)] 98.2 °F (36.8 °C)  Heart Rate:  [72-92] 72  Resp:  [16-18] 16  BP: (100-119)/(60-70) 116/70  SpO2:  [93 %-97 %] 95 %  on   ;   Device (Oxygen Therapy): room air  Body mass index is 27.93 kg/m².  Physical Exam  Constitutional:       General: He is not in acute distress.     Comments: Chronically ill appearing   Cardiovascular:      Rate and Rhythm: Normal rate and regular rhythm.   Pulmonary:      Effort: Pulmonary effort is normal.      Breath sounds: Normal breath sounds.   Abdominal:      General: There is no distension.      Palpations: Abdomen is soft.      Tenderness: There is no abdominal tenderness.   Skin:     General: Skin is warm and dry.         Results Review     I reviewed the patient's new clinical results.  Results from last 7 days   Lab Units 21  0651 21  0640   WBC 10*3/mm3 6.11 6.41 7.87 5.18   HEMOGLOBIN g/dL 8.3* 9.5* 10.1* 11.1*   PLATELETS 10*3/mm3 203 188 196 164     Results from last 7 days   Lab Units 21  0651 21  0640   SODIUM mmol/L 134* 137 133* 137   POTASSIUM mmol/L 4.2 4.5 4.6 4.4   CHLORIDE mmol/L 99 99 99 100   CO2 mmol/L 27.7 28.2 25.4 29.7*   BUN mg/dL 15 13 11 11   CREATININE mg/dL 0.89 0.88 0.76 0.75*   GLUCOSE mg/dL 81 96 124* 85   Estimated Creatinine Clearance: 88.9 mL/min (by C-G formula based on SCr of 0.89 mg/dL).  Results from last 7 days   Lab Units 21  1644   ALBUMIN g/dL 3.60    BILIRUBIN mg/dL 0.6   ALK PHOS U/L 50   AST (SGOT) U/L 18   ALT (SGPT) U/L 10     Results from last 7 days   Lab Units 04/09/21  0437 04/08/21  0445 04/07/21  0651 04/06/21  0640 04/03/21  1644   CALCIUM mg/dL 8.3* 8.3* 8.4* 8.3* 8.0*   ALBUMIN g/dL  --   --   --   --  3.60       COVID19   Date Value Ref Range Status   04/06/2021 Not Detected Not Detected - Ref. Range Final   04/03/2021 Not Detected Not Detected - Ref. Range Final     No results found for: HGBA1C, POCGLU    XR Hip With or Without Pelvis 1 View Right  Narrative: AP VIEW OF THE PELVIS PLUS 2 VIEWS RIGHT HIP     HISTORY: Postop     COMPARISON: 04/05/2021.      FINDINGS:  The patient has undergone fixation of the previously identified  periprosthetic fracture involving the proximal right femur. There is a  transversely oriented lucency seen involving the proximal femoral  diaphysis along the more distal aspect of the hardware which wasn't  clearly seen on the earlier images. It is involving the lateral cortex  of the right femur. Additional fracture is not excluded. There is soft  tissue swelling and subcutaneous gas overlying the right lateral thigh.  Right hip arthroplasty remains in place. No fracture is seen on the  left. There are extensive postsurgical changes involving the lumbar  spine. Morataya catheter is present.     Impression: Postoperative changes, as noted above.     This report was finalized on 4/6/2021 10:13 PM by Dr. Marylou Kilpatrick M.D.       Scheduled Medications  apixaban, 2.5 mg, Oral, Q12H  aspirin, 81 mg, Oral, Daily  atorvastatin, 40 mg, Oral, Daily  carBAMazepine, 400 mg, Oral, BID  famotidine, 20 mg, Oral, BID AC  fexofenadine, 180 mg, Oral, Daily  fluticasone, 2 spray, Each Nare, Daily  modafinil, 200 mg, Oral, Daily  pantoprazole, 40 mg, Oral, QAM  sodium chloride, 10 mL, Intravenous, Q12H  terazosin, 10 mg, Oral, Nightly  vitamin B-12, 1,000 mcg, Oral, Daily    Infusions  lactated ringers, 9 mL/hr, Last Rate: Stopped  (04/07/21 2002)    Diet  Diet Regular       Assessment/Plan     Active Hospital Problems    Diagnosis  POA   • **Broken internal right hip prosthesis (CMS/[facility]) [T84.010A]  Yes   • Closed right hip fracture, initial encounter (CMS/[facility]) [S72.001A]  Yes   • GERD (gastroesophageal reflux disease) [K21.9]  Yes   • Seizures (CMS/[facility]) [R56.9]  Yes   • BPH (benign prostatic hyperplasia) [N40.0]  Yes   • Coronary artery disease [I25.10]  Yes      Resolved Hospital Problems   No resolved problems to display.       76 y.o. male admitted with Broken internal right hip prosthesis (CMS/[facility]).       Right hip prosthesis fracture-ortho is consult.  Underwent total hip arthroplasty revision on 4/6  DVT prophylaxis with Eliquis 2.5 twice daily for 30 days  Resume ASA     Alcohol dependence with intoxication-  monitor CIWA, thiamine, folate, mvi. Ativan for withdrawal.     Allergic rhinitis  Continue fluticasone     Left shoulder pain-x-ray shows chronic fracture of the inferior aspect of the left scapula, old left clavicle fracture, and multiple old left sided rib fractures, but no acute fracture or subluxation of the left shoulder is seen.     CAD s/p CABG in 2016  Continue aspirin, statin.  Not on any beta-blockers from chart review    Carotid artery stenosis  DDD  OA  GERD  TBI with seizure disorder  Anxiety/depression  History of prostate cancer s/p xrt  ADHD  HLD  BPH     [facility] is list below:    Home medications: asa 81, carbamazepine 400 bid, cyanocobalamin 1000 daily, diclofenac 1% topical gel, famotidine 20 mg bid, fexofenadine 180 daily, modafinil 200 daily, mvi daily, pantoprazole 40 mg daily, simvastatin 40 mg daily, terazosin 10 mg daily.      · Eliquis 2.5mg BID  for DVT prophylaxis.  · Full code.  · Discussed with patient and nursing staff.  · Anticipate discharge to SNF, date TBD, once alcohol withdrawal resolves    Mina Gomez  MD [facility]  04/09/21  14:49 EDT     I wore protective equipment throughout this patient encounter including a face mask, gloves and protective eyewear.  Hand hygiene was performed before donning protective equipment and after removal when leaving the room.

## 2021-04-10 LAB
ANION GAP SERPL CALCULATED.3IONS-SCNC: 7.5 MMOL/L (ref 5–15)
BUN SERPL-MCNC: 14 MG/DL (ref 8–23)
BUN/CREAT SERPL: 20 (ref 7–25)
CALCIUM SPEC-SCNC: 8.4 MG/DL (ref 8.6–10.5)
CHLORIDE SERPL-SCNC: 101 MMOL/L (ref 98–107)
CO2 SERPL-SCNC: 27.5 MMOL/L (ref 22–29)
CREAT SERPL-MCNC: 0.7 MG/DL (ref 0.76–1.27)
DEPRECATED RDW RBC AUTO: 42.7 FL (ref 37–54)
ERYTHROCYTE [DISTWIDTH] IN BLOOD BY AUTOMATED COUNT: 11.8 % (ref 12.3–15.4)
GFR SERPL CREATININE-BSD FRML MDRD: 110 ML/MIN/1.73
GLUCOSE SERPL-MCNC: 77 MG/DL (ref 65–99)
HCT VFR BLD AUTO: 24.2 % (ref 37.5–51)
HGB BLD-MCNC: 8 G/DL (ref 13–17.7)
MCH RBC QN AUTO: 33.3 PG (ref 26.6–33)
MCHC RBC AUTO-ENTMCNC: 33.1 G/DL (ref 31.5–35.7)
MCV RBC AUTO: 100.8 FL (ref 79–97)
PLATELET # BLD AUTO: 254 10*3/MM3 (ref 140–450)
PMV BLD AUTO: 8.9 FL (ref 6–12)
POTASSIUM SERPL-SCNC: 3.9 MMOL/L (ref 3.5–5.2)
RBC # BLD AUTO: 2.4 10*6/MM3 (ref 4.14–5.8)
SODIUM SERPL-SCNC: 136 MMOL/L (ref 136–145)
WBC # BLD AUTO: 6.04 10*3/MM3 (ref 3.4–10.8)

## 2021-04-10 PROCEDURE — 85027 COMPLETE CBC AUTOMATED: CPT | Performed by: ORTHOPAEDIC SURGERY

## 2021-04-10 PROCEDURE — 25010000002 LORAZEPAM PER 2 MG: Performed by: ORTHOPAEDIC SURGERY

## 2021-04-10 PROCEDURE — 80048 BASIC METABOLIC PNL TOTAL CA: CPT | Performed by: ORTHOPAEDIC SURGERY

## 2021-04-10 RX ORDER — LORAZEPAM 0.5 MG/1
0.5 TABLET ORAL ONCE
Status: COMPLETED | OUTPATIENT
Start: 2021-04-11 | End: 2021-04-10

## 2021-04-10 RX ADMIN — SODIUM CHLORIDE, PRESERVATIVE FREE 10 ML: 5 INJECTION INTRAVENOUS at 10:27

## 2021-04-10 RX ADMIN — FAMOTIDINE 20 MG: 20 TABLET, FILM COATED ORAL at 04:58

## 2021-04-10 RX ADMIN — CARBAMAZEPINE 400 MG: 200 TABLET ORAL at 20:13

## 2021-04-10 RX ADMIN — ASPIRIN 81 MG: 81 TABLET, COATED ORAL at 10:26

## 2021-04-10 RX ADMIN — LORAZEPAM 1 MG: 2 INJECTION INTRAMUSCULAR; INTRAVENOUS at 00:17

## 2021-04-10 RX ADMIN — OXYCODONE HYDROCHLORIDE AND ACETAMINOPHEN 1 TABLET: 7.5; 325 TABLET ORAL at 10:26

## 2021-04-10 RX ADMIN — CARBAMAZEPINE 400 MG: 200 TABLET ORAL at 10:29

## 2021-04-10 RX ADMIN — APIXABAN 2.5 MG: 2.5 TABLET, FILM COATED ORAL at 10:26

## 2021-04-10 RX ADMIN — SODIUM CHLORIDE, PRESERVATIVE FREE 10 ML: 5 INJECTION INTRAVENOUS at 20:14

## 2021-04-10 RX ADMIN — PANTOPRAZOLE SODIUM 40 MG: 40 TABLET, DELAYED RELEASE ORAL at 04:58

## 2021-04-10 RX ADMIN — OXYCODONE HYDROCHLORIDE AND ACETAMINOPHEN 1 TABLET: 7.5; 325 TABLET ORAL at 14:44

## 2021-04-10 RX ADMIN — Medication 1000 MCG: at 10:26

## 2021-04-10 RX ADMIN — LORAZEPAM 0.5 MG: 0.5 TABLET ORAL at 23:23

## 2021-04-10 RX ADMIN — TERAZOSIN HYDROCHLORIDE 10 MG: 5 CAPSULE ORAL at 10:26

## 2021-04-10 RX ADMIN — LORAZEPAM 1 MG: 2 INJECTION INTRAMUSCULAR; INTRAVENOUS at 01:33

## 2021-04-10 RX ADMIN — APIXABAN 2.5 MG: 2.5 TABLET, FILM COATED ORAL at 20:13

## 2021-04-10 RX ADMIN — OXYCODONE HYDROCHLORIDE AND ACETAMINOPHEN 1 TABLET: 5; 325 TABLET ORAL at 20:13

## 2021-04-10 RX ADMIN — ATORVASTATIN CALCIUM 40 MG: 20 TABLET, FILM COATED ORAL at 10:27

## 2021-04-10 NOTE — PROGRESS NOTES
Continued Stay Note  [facility]     Patient Name: Regan Cavazos  MRN: [id number]  Today's Date: 4/10/2021    Admit Date: 4/3/2021    Discharge Plan     Row Name 04/10/21 0740       Plan    Plan   from [facility], they are reviewing information and will update [facility] Monday...........Keri BAZAN    Plan Comments   from [facility]- the Director of Nursing will review and they will follow-up with [facility] Monday. Trish can be reached at [phone]............Keri RN        Discharge Codes    No documentation.             Emilia Heath, RN

## 2021-04-10 NOTE — PLAN OF CARE
Goal Outcome Evaluation:  Plan of Care Reviewed With: patient  Progress: no change  Outcome Summary: Pt remains stable. Following CIWAA protocol, Ativan given x3 doses thus far. Pt has rested very little this shift and continues to be confused. Voiding per brief. No skin issues. Dressing is CDI. No pain meds givne. Sz precautions continued, unable to educate r/t confusion. Working with physical therapy when able. Labs pending for this AM. Will require rehab for d/c. CHYNA.

## 2021-04-10 NOTE — PLAN OF CARE
Goal Outcome Evaluation:         POD #4 r hip revision after fall at home, followed by ethol withdrawal, DAY[place] charting done, [facility] dc'd ativan, percocet for pain, sleepy on and off all day, when awake patient is notably restless with attempts OOB, encouraged PO intake, voiding incontinently, seizure precautions maintained, plan is for rehab at [facility], pre-cert pending

## 2021-04-10 NOTE — PROGRESS NOTES
Name: Regan Cavazos ADMIT: 4/3/2021   : 1945  PCP: Latricia Schulz APRN    MRN: [id number] LOS: 7 days   AGE/SEX: 76 y.o. male  ROOM: [facility]     Subjective   Subjective   He did receive 2 doses of Ativan overnight for agitation on his own protocol.  This morning he was slightly more awake than he has been for me in the past and told me that he does not drink that often.      Review of Systems   Constitutional: Negative.    Respiratory: Negative.    Cardiovascular: Negative.    Gastrointestinal: Negative.         Objective   Objective   Vital Signs  Temp:  [97.1 °F (36.2 °C)-98.2 °F (36.8 °C)] 97.1 °F (36.2 °C)  Heart Rate:  [72-91] 80  Resp:  [16-18] 16  BP: (108-128)/(60-72) 108/70  SpO2:  [95 %-98 %] 97 %  on   ;   Device (Oxygen Therapy): room air  Body mass index is 27.93 kg/m².  Physical Exam  Constitutional:       General: He is not in acute distress.     Comments: Chronically ill appearing   HENT:      Head: Normocephalic and atraumatic.   Cardiovascular:      Rate and Rhythm: Normal rate and regular rhythm.   Pulmonary:      Effort: Pulmonary effort is normal.      Breath sounds: Normal breath sounds.   Abdominal:      General: There is no distension.      Palpations: Abdomen is soft.      Tenderness: There is no abdominal tenderness.   Skin:     General: Skin is warm and dry.         Results Review     I reviewed the patient's new clinical results.  Results from last 7 days   Lab Units 04/10/21  0431 04/09/21  0437 04/08/21  0445 04/07/21  0651   WBC 10*3/mm3 6.04 6.11 6.41 7.87   HEMOGLOBIN g/dL 8.0* 8.3* 9.5* 10.1*   PLATELETS 10*3/mm3 254 203 188 196     Results from last 7 days   Lab Units 04/10/21  0431 04/09/21  0437 04/08/21  0445 04/07/21  0651   SODIUM mmol/L 136 134* 137 133*   POTASSIUM mmol/L 3.9 4.2 4.5 4.6   CHLORIDE mmol/L 101 99 99 99   CO2 mmol/L 27.5 27.7 28.2 25.4   BUN mg/dL 14 15 13 11   CREATININE mg/dL 0.70* 0.89 0.88 0.76   GLUCOSE mg/dL 77 81 96 124*   Estimated  Creatinine Clearance: 98.9 mL/min (A) (by C-G formula based on SCr of 0.7 mg/dL (L)).  Results from last 7 days   Lab Units 04/03/21  1644   ALBUMIN g/dL 3.60   BILIRUBIN mg/dL 0.6   ALK PHOS U/L 50   AST (SGOT) U/L 18   ALT (SGPT) U/L 10     Results from last 7 days   Lab Units 04/10/21  0431 04/09/21  0437 04/08/21  0445 04/07/21  0651 04/03/21  1644   CALCIUM mg/dL 8.4* 8.3* 8.3* 8.4* 8.0*   ALBUMIN g/dL  --   --   --   --  3.60       COVID19   Date Value Ref Range Status   04/06/2021 Not Detected Not Detected - Ref. Range Final   04/03/2021 Not Detected Not Detected - Ref. Range Final     No results found for: HGBA1C, POCGLU    XR Hip With or Without Pelvis 1 View Right  Narrative: AP VIEW OF THE PELVIS PLUS 2 VIEWS RIGHT HIP     HISTORY: Postop     COMPARISON: 04/05/2021.      FINDINGS:  The patient has undergone fixation of the previously identified  periprosthetic fracture involving the proximal right femur. There is a  transversely oriented lucency seen involving the proximal femoral  diaphysis along the more distal aspect of the hardware which wasn't  clearly seen on the earlier images. It is involving the lateral cortex  of the right femur. Additional fracture is not excluded. There is soft  tissue swelling and subcutaneous gas overlying the right lateral thigh.  Right hip arthroplasty remains in place. No fracture is seen on the  left. There are extensive postsurgical changes involving the lumbar  spine. Morataya catheter is present.     Impression: Postoperative changes, as noted above.     This report was finalized on 4/6/2021 10:13 PM by Dr. Marylou Kilpatrick M.D.       Scheduled Medications  apixaban, 2.5 mg, Oral, Q12H  aspirin, 81 mg, Oral, Daily  atorvastatin, 40 mg, Oral, Daily  carBAMazepine, 400 mg, Oral, BID  famotidine, 20 mg, Oral, BID AC  fexofenadine, 180 mg, Oral, Daily  fluticasone, 2 spray, Each Nare, Daily  pantoprazole, 40 mg, Oral, QAM  sodium chloride, 10 mL, Intravenous,  Q12H  terazosin, 10 mg, Oral, Nightly  vitamin B-12, 1,000 mcg, Oral, Daily    Infusions  lactated ringers, 9 mL/hr, Last Rate: Stopped (04/07/21 2002)    Diet  Diet Regular       Assessment/Plan     Active Hospital Problems    Diagnosis  POA   • **Broken internal right hip prosthesis (CMS/[facility]) [T84.010A]  Yes   • Closed right hip fracture, initial encounter (CMS/[facility]) [S72.001A]  Yes   • GERD (gastroesophageal reflux disease) [K21.9]  Yes   • Seizures (CMS/[facility]) [R56.9]  Yes   • BPH (benign prostatic hyperplasia) [N40.0]  Yes   • Coronary artery disease [I25.10]  Yes      Resolved Hospital Problems   No resolved problems to display.       76 y.o. male admitted with Broken internal right hip prosthesis (CMS/[facility]).       Right hip prosthesis fracture-ortho is consult.  Underwent total hip arthroplasty revision on 4/6  DVT prophylaxis with Eliquis 2.5 twice daily for 30 days  Resume ASA     Alcohol dependence  Discontinuing CIWA protocol, monitor overnight.  If patient does not require Ativan/is not agitated, likely discharge tomorrow       Allergic rhinitis  Continue fluticasone     Left shoulder pain-x-ray shows chronic fracture of the inferior aspect of the left scapula, old left clavicle fracture, and multiple old left sided rib fractures, but no acute fracture or subluxation of the left shoulder is seen.     CAD s/p CABG in 2016  Continue aspirin, statin.  Not on any beta-blockers from chart review    Carotid artery stenosis  DDD  OA  GERD  TBI with seizure disorder  Anxiety/depression  History of prostate cancer s/p xrt  ADHD  HLD  BPH     [facility] is list below:    Home medications: asa 81, carbamazepine 400 bid, cyanocobalamin 1000 daily, diclofenac 1% topical gel, famotidine 20 mg bid, fexofenadine 180 daily, modafinil 200 daily, mvi daily, pantoprazole 40 mg daily, simvastatin 40 mg daily, terazosin 10 mg daily.      · Eliquis 2.5mg BID   for DVT prophylaxis.  · Full code.  · Discussed with patient and nursing staff.  · Anticipate discharge to SNF, hopefully tomorrow    Mina Gomez MD  [facility]  04/10/21  09:50 EDT     I wore protective equipment throughout this patient encounter including a face mask, gloves and protective eyewear.  Hand hygiene was performed before donning protective equipment and after removal when leaving the room.

## 2021-04-11 LAB
ANION GAP SERPL CALCULATED.3IONS-SCNC: 10.5 MMOL/L (ref 5–15)
BUN SERPL-MCNC: 14 MG/DL (ref 8–23)
BUN/CREAT SERPL: 18.4 (ref 7–25)
CALCIUM SPEC-SCNC: 8.5 MG/DL (ref 8.6–10.5)
CHLORIDE SERPL-SCNC: 100 MMOL/L (ref 98–107)
CO2 SERPL-SCNC: 26.5 MMOL/L (ref 22–29)
CREAT SERPL-MCNC: 0.76 MG/DL (ref 0.76–1.27)
DEPRECATED RDW RBC AUTO: 45.9 FL (ref 37–54)
ERYTHROCYTE [DISTWIDTH] IN BLOOD BY AUTOMATED COUNT: 12.4 % (ref 12.3–15.4)
GFR SERPL CREATININE-BSD FRML MDRD: 100 ML/MIN/1.73
GLUCOSE SERPL-MCNC: 72 MG/DL (ref 65–99)
HCT VFR BLD AUTO: 24.7 % (ref 37.5–51)
HGB BLD-MCNC: 8.1 G/DL (ref 13–17.7)
MCH RBC QN AUTO: 33.3 PG (ref 26.6–33)
MCHC RBC AUTO-ENTMCNC: 32.8 G/DL (ref 31.5–35.7)
MCV RBC AUTO: 101.6 FL (ref 79–97)
PLATELET # BLD AUTO: 318 10*3/MM3 (ref 140–450)
PMV BLD AUTO: 8.7 FL (ref 6–12)
POTASSIUM SERPL-SCNC: 3.7 MMOL/L (ref 3.5–5.2)
RBC # BLD AUTO: 2.43 10*6/MM3 (ref 4.14–5.8)
SODIUM SERPL-SCNC: 137 MMOL/L (ref 136–145)
WBC # BLD AUTO: 5.6 10*3/MM3 (ref 3.4–10.8)

## 2021-04-11 PROCEDURE — 80048 BASIC METABOLIC PNL TOTAL CA: CPT | Performed by: ORTHOPAEDIC SURGERY

## 2021-04-11 PROCEDURE — 85027 COMPLETE CBC AUTOMATED: CPT | Performed by: ORTHOPAEDIC SURGERY

## 2021-04-11 PROCEDURE — 97110 THERAPEUTIC EXERCISES: CPT | Performed by: PHYSICAL THERAPIST

## 2021-04-11 RX ADMIN — SODIUM CHLORIDE, PRESERVATIVE FREE 10 ML: 5 INJECTION INTRAVENOUS at 09:20

## 2021-04-11 RX ADMIN — OXYCODONE HYDROCHLORIDE AND ACETAMINOPHEN 1 TABLET: 5; 325 TABLET ORAL at 14:54

## 2021-04-11 RX ADMIN — APIXABAN 2.5 MG: 2.5 TABLET, FILM COATED ORAL at 09:20

## 2021-04-11 RX ADMIN — OXYCODONE HYDROCHLORIDE AND ACETAMINOPHEN 1 TABLET: 5; 325 TABLET ORAL at 10:49

## 2021-04-11 RX ADMIN — FAMOTIDINE 20 MG: 20 TABLET, FILM COATED ORAL at 17:55

## 2021-04-11 RX ADMIN — CARBAMAZEPINE 400 MG: 200 TABLET ORAL at 09:19

## 2021-04-11 RX ADMIN — FAMOTIDINE 20 MG: 20 TABLET, FILM COATED ORAL at 06:45

## 2021-04-11 RX ADMIN — FLUTICASONE PROPIONATE 2 SPRAY: 50 SPRAY, METERED NASAL at 09:19

## 2021-04-11 RX ADMIN — CARBAMAZEPINE 400 MG: 200 TABLET ORAL at 20:49

## 2021-04-11 RX ADMIN — ASPIRIN 81 MG: 81 TABLET, COATED ORAL at 09:20

## 2021-04-11 RX ADMIN — PANTOPRAZOLE SODIUM 40 MG: 40 TABLET, DELAYED RELEASE ORAL at 06:45

## 2021-04-11 RX ADMIN — APIXABAN 2.5 MG: 2.5 TABLET, FILM COATED ORAL at 20:49

## 2021-04-11 RX ADMIN — ATORVASTATIN CALCIUM 40 MG: 20 TABLET, FILM COATED ORAL at 09:20

## 2021-04-11 RX ADMIN — Medication 1000 MCG: at 09:20

## 2021-04-11 RX ADMIN — TERAZOSIN HYDROCHLORIDE 10 MG: 5 CAPSULE ORAL at 20:49

## 2021-04-11 RX ADMIN — SODIUM CHLORIDE, PRESERVATIVE FREE 10 ML: 5 INJECTION INTRAVENOUS at 20:51

## 2021-04-11 NOTE — PLAN OF CARE
Goal Outcome Evaluation:        Outcome Summary: POD 5 RTH Revision, still confused but much better, worked w/PT, assist x1, voiding incontinently in brief, Percocet for pain, island dressing c/d/i, educated on ways to decrease anxiety
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
## 2021-04-11 NOTE — PROGRESS NOTES
Name: Regan Cavazos ADMIT: 4/3/2021   : 1945  PCP: Latricia Schulz APRN    MRN: [id number] LOS: 8 days   AGE/SEX: 76 y.o. male  ROOM: [facility]     Subjective   Subjective   Was agitated overnight, received 1 dose of oral Ativan.  This morning he was more oriented, told me that he had basically with drinking for the past month since his divorce.    Review of Systems   Constitutional: Negative.    Respiratory: Negative.    Cardiovascular: Negative.    Gastrointestinal: Negative.         Objective   Objective   Vital Signs  Temp:  [97 °F (36.1 °C)-98.2 °F (36.8 °C)] 97.7 °F (36.5 °C)  Heart Rate:  [76-85] 85  Resp:  [16-18] 18  BP: (116-143)/(66-85) 143/76  SpO2:  [97 %-99 %] 97 %  on   ;   Device (Oxygen Therapy): room air  Body mass index is 27.93 kg/m².  Physical Exam  Constitutional:       General: He is not in acute distress.     Comments: Chronically ill appearing   HENT:      Head: Normocephalic and atraumatic.   Cardiovascular:      Rate and Rhythm: Normal rate and regular rhythm.   Pulmonary:      Effort: Pulmonary effort is normal.      Breath sounds: Normal breath sounds.   Abdominal:      General: There is no distension.      Palpations: Abdomen is soft.      Tenderness: There is no abdominal tenderness.   Skin:     General: Skin is warm and dry.         Results Review     I reviewed the patient's new clinical results.  Results from last 7 days   Lab Units 04/11/21  0633 04/10/21  0431 04/09/21  0437 04/08/21  0445   WBC 10*3/mm3 5.60 6.04 6.11 6.41   HEMOGLOBIN g/dL 8.1* 8.0* 8.3* 9.5*   PLATELETS 10*3/mm3 318 254 203 188     Results from last 7 days   Lab Units 04/11/21  0633 04/10/21  0431 04/09/21  0437 04/08/21  0445   SODIUM mmol/L 137 136 134* 137   POTASSIUM mmol/L 3.7 3.9 4.2 4.5   CHLORIDE mmol/L 100 101 99 99   CO2 mmol/L 26.5 27.5 27.7 28.2   BUN mg/dL 14 14 15 13   CREATININE mg/dL 0.76 0.70* 0.89 0.88   GLUCOSE mg/dL 72 77 81 96   Estimated Creatinine Clearance: 98.9 mL/min (by  C-G formula based on SCr of 0.76 mg/dL).      Results from last 7 days   Lab Units 04/11/21  0633 04/10/21  0431 04/09/21  0437 04/08/21  0445   CALCIUM mg/dL 8.5* 8.4* 8.3* 8.3*       COVID19   Date Value Ref Range Status   04/06/2021 Not Detected Not Detected - Ref. Range Final   04/03/2021 Not Detected Not Detected - Ref. Range Final     No results found for: HGBA1C, POCGLU    XR Hip With or Without Pelvis 1 View Right  Narrative: AP VIEW OF THE PELVIS PLUS 2 VIEWS RIGHT HIP     HISTORY: Postop     COMPARISON: 04/05/2021.      FINDINGS:  The patient has undergone fixation of the previously identified  periprosthetic fracture involving the proximal right femur. There is a  transversely oriented lucency seen involving the proximal femoral  diaphysis along the more distal aspect of the hardware which wasn't  clearly seen on the earlier images. It is involving the lateral cortex  of the right femur. Additional fracture is not excluded. There is soft  tissue swelling and subcutaneous gas overlying the right lateral thigh.  Right hip arthroplasty remains in place. No fracture is seen on the  left. There are extensive postsurgical changes involving the lumbar  spine. Morataya catheter is present.     Impression: Postoperative changes, as noted above.     This report was finalized on 4/6/2021 10:13 PM by Dr. Marylou Kilpatrick M.D.       Scheduled Medications  apixaban, 2.5 mg, Oral, Q12H  aspirin, 81 mg, Oral, Daily  atorvastatin, 40 mg, Oral, Daily  carBAMazepine, 400 mg, Oral, BID  famotidine, 20 mg, Oral, BID AC  fexofenadine, 180 mg, Oral, Daily  fluticasone, 2 spray, Each Nare, Daily  pantoprazole, 40 mg, Oral, QAM  sodium chloride, 10 mL, Intravenous, Q12H  terazosin, 10 mg, Oral, Nightly  vitamin B-12, 1,000 mcg, Oral, Daily    Infusions  lactated ringers, 9 mL/hr, Last Rate: Stopped (04/07/21 2002)    Diet  Diet Regular       Assessment/Plan     Active Hospital Problems    Diagnosis  POA   • **Broken internal right  hip prosthesis (CMS/[facility]) [T84.010A]  Yes   • Closed right hip fracture, initial encounter (CMS/[facility]) [S72.001A]  Yes   • GERD (gastroesophageal reflux disease) [K21.9]  Yes   • Seizures (CMS/[facility]) [R56.9]  Yes   • BPH (benign prostatic hyperplasia) [N40.0]  Yes   • Coronary artery disease [I25.10]  Yes      Resolved Hospital Problems   No resolved problems to display.       76 y.o. male admitted with Broken internal right hip prosthesis (CMS/[facility]).       Right hip prosthesis fracture-ortho is consult.  Underwent total hip arthroplasty revision on 4/6  DVT prophylaxis with Eliquis 2.5 twice daily for 30 days  Resume ASA   PT/OT evaluation.    Alcohol dependence  Continue CIWA scoring, may reinitiate at 2 9 protocol if he continues to have elevated scores.    Allergic rhinitis  Continue fluticasone     Left shoulder pain-x-ray shows chronic fracture of the inferior aspect of the left scapula, old left clavicle fracture, and multiple old left sided rib fractures, but no acute fracture or subluxation of the left shoulder is seen.     CAD s/p CABG in 2016  Continue aspirin, statin.  Not on any beta-blockers from chart review    Carotid artery stenosis  DDD  OA  GERD  TBI with seizure disorder  Anxiety/depression  History of prostate cancer s/p xrt  ADHD  HLD  BPH     [facility] medication [facility] is list below:    Home medications: asa 81, carbamazepine 400 bid, cyanocobalamin 1000 daily, diclofenac 1% topical gel, famotidine 20 mg bid, fexofenadine 180 daily, modafinil 200 daily, mvi daily, pantoprazole 40 mg daily, simvastatin 40 mg daily, terazosin 10 mg daily.          · Eliquis 2.5mg BID  for DVT prophylaxis.  · Full code.  · Discussed with patient and nursing staff.  · Anticipate discharge to SNF pending bed availability    Mina Gomez MD  [facility]  04/11/21  12:38 EDT     I wore protective equipment throughout this patient encounter  including a face mask, gloves and protective eyewear.  Hand hygiene was performed before donning protective equipment and after removal when leaving the room.

## 2021-04-11 NOTE — PLAN OF CARE
Goal Outcome Evaluation:  Plan of Care Reviewed With: patient     Outcome Summary: VSS, ativan D/C'd yesterday, but patient with increased agitation and confusion during night, one time dose po ativan given, incontinent, unable to understand and follow direction. island dressing changed right hip. meds whole with water.

## 2021-04-11 NOTE — PLAN OF CARE
Goal Outcome Evaluation:  Plan of Care Reviewed With: patient     Outcome Summary: Pt was calm and agreeable to therapy. He frequently question if he broke his leg then how he broke it. Requires frequent verbal cueing to maintain PWB restriction. Will continue to progress as tolerated.

## 2021-04-12 LAB
ANION GAP SERPL CALCULATED.3IONS-SCNC: 11.6 MMOL/L (ref 5–15)
BUN SERPL-MCNC: 12 MG/DL (ref 8–23)
BUN/CREAT SERPL: 16.4 (ref 7–25)
CALCIUM SPEC-SCNC: 8.4 MG/DL (ref 8.6–10.5)
CHLORIDE SERPL-SCNC: 99 MMOL/L (ref 98–107)
CO2 SERPL-SCNC: 25.4 MMOL/L (ref 22–29)
CREAT SERPL-MCNC: 0.73 MG/DL (ref 0.76–1.27)
DEPRECATED RDW RBC AUTO: 46.8 FL (ref 37–54)
ERYTHROCYTE [DISTWIDTH] IN BLOOD BY AUTOMATED COUNT: 12.6 % (ref 12.3–15.4)
GFR SERPL CREATININE-BSD FRML MDRD: 105 ML/MIN/1.73
GLUCOSE SERPL-MCNC: 93 MG/DL (ref 65–99)
HCT VFR BLD AUTO: 24.3 % (ref 37.5–51)
HGB BLD-MCNC: 8.3 G/DL (ref 13–17.7)
MCH RBC QN AUTO: 34.7 PG (ref 26.6–33)
MCHC RBC AUTO-ENTMCNC: 34.2 G/DL (ref 31.5–35.7)
MCV RBC AUTO: 101.7 FL (ref 79–97)
PLATELET # BLD AUTO: 299 10*3/MM3 (ref 140–450)
PMV BLD AUTO: 8.4 FL (ref 6–12)
POTASSIUM SERPL-SCNC: 3.4 MMOL/L (ref 3.5–5.2)
RBC # BLD AUTO: 2.39 10*6/MM3 (ref 4.14–5.8)
SODIUM SERPL-SCNC: 136 MMOL/L (ref 136–145)
WBC # BLD AUTO: 5.43 10*3/MM3 (ref 3.4–10.8)

## 2021-04-12 PROCEDURE — 80048 BASIC METABOLIC PNL TOTAL CA: CPT | Performed by: ORTHOPAEDIC SURGERY

## 2021-04-12 PROCEDURE — 97110 THERAPEUTIC EXERCISES: CPT

## 2021-04-12 PROCEDURE — 85027 COMPLETE CBC AUTOMATED: CPT | Performed by: ORTHOPAEDIC SURGERY

## 2021-04-12 RX ORDER — POLYETHYLENE GLYCOL 3350 17 G/17G
17 POWDER, FOR SOLUTION ORAL DAILY
Status: DISCONTINUED | OUTPATIENT
Start: 2021-04-12 | End: 2021-04-14 | Stop reason: HOSPADM

## 2021-04-12 RX ADMIN — ATORVASTATIN CALCIUM 40 MG: 20 TABLET, FILM COATED ORAL at 08:37

## 2021-04-12 RX ADMIN — APIXABAN 2.5 MG: 2.5 TABLET, FILM COATED ORAL at 08:38

## 2021-04-12 RX ADMIN — CARBAMAZEPINE 400 MG: 200 TABLET ORAL at 08:38

## 2021-04-12 RX ADMIN — Medication 1000 MCG: at 08:38

## 2021-04-12 RX ADMIN — FLUTICASONE PROPIONATE 2 SPRAY: 50 SPRAY, METERED NASAL at 08:39

## 2021-04-12 RX ADMIN — OXYCODONE HYDROCHLORIDE AND ACETAMINOPHEN 1 TABLET: 5; 325 TABLET ORAL at 23:30

## 2021-04-12 RX ADMIN — ASPIRIN 81 MG: 81 TABLET, COATED ORAL at 08:38

## 2021-04-12 RX ADMIN — FAMOTIDINE 20 MG: 20 TABLET, FILM COATED ORAL at 19:08

## 2021-04-12 RX ADMIN — CARBAMAZEPINE 400 MG: 200 TABLET ORAL at 20:54

## 2021-04-12 RX ADMIN — OXYCODONE HYDROCHLORIDE AND ACETAMINOPHEN 1 TABLET: 5; 325 TABLET ORAL at 08:38

## 2021-04-12 RX ADMIN — POLYETHYLENE GLYCOL 3350 17 G: 17 POWDER, FOR SOLUTION ORAL at 13:09

## 2021-04-12 RX ADMIN — OXYCODONE HYDROCHLORIDE AND ACETAMINOPHEN 2 TABLET: 5; 325 TABLET ORAL at 02:19

## 2021-04-12 RX ADMIN — PANTOPRAZOLE SODIUM 40 MG: 40 TABLET, DELAYED RELEASE ORAL at 08:38

## 2021-04-12 RX ADMIN — TERAZOSIN HYDROCHLORIDE 10 MG: 5 CAPSULE ORAL at 20:53

## 2021-04-12 RX ADMIN — OXYCODONE HYDROCHLORIDE AND ACETAMINOPHEN 1 TABLET: 5; 325 TABLET ORAL at 13:12

## 2021-04-12 RX ADMIN — FAMOTIDINE 20 MG: 20 TABLET, FILM COATED ORAL at 08:37

## 2021-04-12 RX ADMIN — APIXABAN 2.5 MG: 2.5 TABLET, FILM COATED ORAL at 20:54

## 2021-04-12 RX ADMIN — SODIUM CHLORIDE, PRESERVATIVE FREE 10 ML: 5 INJECTION INTRAVENOUS at 08:43

## 2021-04-12 RX ADMIN — SODIUM CHLORIDE, PRESERVATIVE FREE 10 ML: 5 INJECTION INTRAVENOUS at 20:54

## 2021-04-12 RX ADMIN — OXYCODONE HYDROCHLORIDE AND ACETAMINOPHEN 1 TABLET: 5; 325 TABLET ORAL at 22:29

## 2021-04-12 NOTE — PROGRESS NOTES
Continued Stay Note  [facility]     Patient Name: Regan Cavazos  MRN: [id number]  Today's Date: 4/12/2021    Admit Date: 4/3/2021    Discharge Plan     Row Name 04/12/21 1451       Plan    Plan  [facility] -- Accepted & Pre-cert Started    Patient/Family in Agreement with Plan  yes    Plan Comments  Spoke with Kristyn Kum[facility] who has accepted the patient. Per [facility] documentation, the patient is confused at this time. Therefore CCP spoke with his partner/Pat and updated her. Partner/Pat is agreeable to the d/c plan. Updated Krist[facility] who will start pre-cert now. Patient will need an ambulance to transport at d/c. No other needs identified at this time. Await SNF pre-cert.        Discharge Codes    No documentation.             Bettina Blake RN

## 2021-04-12 NOTE — PLAN OF CARE
Goal Outcome Evaluation:  Plan of Care Reviewed With: patient  Progress: improving  Outcome Summary: Pt seen for PT this afternoon. He was sitting up EOB upon entry to room. Pt able to stand and take several steps in room to transfer from bed to chair. Unsure if pt is maintainig PWB status on RLE, verbal cues given. Pt focused on trying to get his phone to work while therapy working w pt. He was able to complete hip exercises with some assistance. [facility] as unable to tolerate ambulating longer distance while maintaining PWB status. WIll continue to progress activity as tolerated.  Patient was intermittently wearing a face mask during this therapy encounter. Therapist used appropriate personal protective equipment including eye protection, mask, and gloves.  Mask used was standard procedure mask. Appropriate PPE was worn during the entire therapy session. Hand hygiene was completed before and after therapy session. Patient is not in enhanced droplet precautions.

## 2021-04-12 NOTE — PROGRESS NOTES
Name: Regan Cavazos ADMIT: 4/3/2021   : 1945  PCP: Latricia Schulz APRN    MRN: [id number] LOS: 9 days   AGE/SEX: 76 y.o. male  ROOM: [facility]     Subjective   Subjective   Pleasant this AM. More alert and oriented. Low CIWA scores.     Review of Systems   Constitutional: Negative.    Respiratory: Negative.    Cardiovascular: Negative.    Gastrointestinal: Negative.         Objective   Objective   Vital Signs  Temp:  [97.3 °F (36.3 °C)-98 °F (36.7 °C)] 97.3 °F (36.3 °C)  Heart Rate:  [71-99] 72  Resp:  [16-18] 16  BP: (113-143)/(65-76) 113/68  SpO2:  [95 %-100 %] 96 %  on   ;   Device (Oxygen Therapy): room air  Body mass index is 27.93 kg/m².  Physical Exam  Constitutional:       General: He is not in acute distress.     Comments: Chronically ill appearing   HENT:      Head: Normocephalic and atraumatic.   Cardiovascular:      Rate and Rhythm: Normal rate and regular rhythm.      Pulses: Normal pulses.   Pulmonary:      Effort: Pulmonary effort is normal.      Breath sounds: Normal breath sounds.   Abdominal:      General: There is no distension.      Palpations: Abdomen is soft.      Tenderness: There is no abdominal tenderness.   Musculoskeletal:      Comments: Dressing on right hip.    Skin:     General: Skin is warm and dry.         Results Review     I reviewed the patient's new clinical results.  Results from last 7 days   Lab Units 04/12/21  0415 04/11/21  0633 04/10/21  0431 04/09/21  0437   WBC 10*3/mm3 5.43 5.60 6.04 6.11   HEMOGLOBIN g/dL 8.3* 8.1* 8.0* 8.3*   PLATELETS 10*3/mm3 299 318 254 203     Results from last 7 days   Lab Units 04/12/21  0415 04/11/21  0633 04/10/21  0431 04/09/21  0437   SODIUM mmol/L 136 137 136 134*   POTASSIUM mmol/L 3.4* 3.7 3.9 4.2   CHLORIDE mmol/L 99 100 101 99   CO2 mmol/L 25.4 26.5 27.5 27.7   BUN mg/dL 12 14 14 15   CREATININE mg/dL 0.73* 0.76 0.70* 0.89   GLUCOSE mg/dL 93 72 77 81   Estimated Creatinine Clearance: 98.9 mL/min (A) (by C-G formula based on  SCr of 0.73 mg/dL (L)).      Results from last 7 days   Lab Units 04/12/21  0415 04/11/21  0633 04/10/21  0431 04/09/21  0437   CALCIUM mg/dL 8.4* 8.5* 8.4* 8.3*       COVID19   Date Value Ref Range Status   04/06/2021 Not Detected Not Detected - Ref. Range Final   04/03/2021 Not Detected Not Detected - Ref. Range Final     No results found for: HGBA1C, POCGLU    XR Hip With or Without Pelvis 1 View Right  Narrative: AP VIEW OF THE PELVIS PLUS 2 VIEWS RIGHT HIP     HISTORY: Postop     COMPARISON: 04/05/2021.      FINDINGS:  The patient has undergone fixation of the previously identified  periprosthetic fracture involving the proximal right femur. There is a  transversely oriented lucency seen involving the proximal femoral  diaphysis along the more distal aspect of the hardware which wasn't  clearly seen on the earlier images. It is involving the lateral cortex  of the right femur. Additional fracture is not excluded. There is soft  tissue swelling and subcutaneous gas overlying the right lateral thigh.  Right hip arthroplasty remains in place. No fracture is seen on the  left. There are extensive postsurgical changes involving the lumbar  spine. Morataya catheter is present.     Impression: Postoperative changes, as noted above.     This report was finalized on 4/6/2021 10:13 PM by Dr. Marylou Kilpatrick M.D.       Scheduled Medications  apixaban, 2.5 mg, Oral, Q12H  aspirin, 81 mg, Oral, Daily  atorvastatin, 40 mg, Oral, Daily  carBAMazepine, 400 mg, Oral, BID  famotidine, 20 mg, Oral, BID AC  fexofenadine, 180 mg, Oral, Daily  fluticasone, 2 spray, Each Nare, Daily  pantoprazole, 40 mg, Oral, QAM  sodium chloride, 10 mL, Intravenous, Q12H  terazosin, 10 mg, Oral, Nightly  vitamin B-12, 1,000 mcg, Oral, Daily    Infusions  lactated ringers, 9 mL/hr, Last Rate: Stopped (04/07/21 2002)    Diet  Diet Regular       Assessment/Plan     Active Hospital Problems    Diagnosis  POA   • **Broken internal right hip prosthesis  (CMS/[facility]) [T84.010A]  Yes   • Closed right hip fracture, initial encounter (CMS/[facility]) [S72.001A]  Yes   • GERD (gastroesophageal reflux disease) [K21.9]  Yes   • Seizures (CMS/[facility]) [R56.9]  Yes   • BPH (benign prostatic hyperplasia) [N40.0]  Yes   • Coronary artery disease [I25.10]  Yes      Resolved Hospital Problems   No resolved problems to display.       76 y.o. male admitted with Broken internal right hip prosthesis (CMS/[facility]).       Right hip prosthesis fracture-ortho is consult.  Underwent total hip arthroplasty revision on 4/6  DVT prophylaxis with Eliquis 2.5 twice daily for 30 days  Resume ASA   PT/OT evaluation.    Alcohol dependence  Likely past withdrawal. Still somewhat agitated and disoriented at times, but overall better and not requiring ativn.    Allergic rhinitis  Continue fluticasone     Left shoulder pain-x-ray shows chronic fracture of the inferior aspect of the left scapula, old left clavicle fracture, and multiple old left sided rib fractures, but no acute fracture or subluxation of the left shoulder is seen.     CAD s/p CABG in 2016  Continue aspirin, statin.  Not on any beta-blockers from chart review    Carotid artery stenosis  DDD  OA  GERD  TBI with seizure disorder  Anxiety/depression  History of prostate cancer s/p xrt  ADHD  HLD  BPH     [facility] medication [facility] is list below:    Home medications: asa 81, carbamazepine 400 bid, cyanocobalamin 1000 daily, diclofenac 1% topical gel, famotidine 20 mg bid, fexofenadine 180 daily, modafinil 200 daily, mvi daily, pantoprazole 40 mg daily, simvastatin 40 mg daily, terazosin 10 mg daily.          · Eliquis 2.5mg BID  for DVT prophylaxis.  · Full code.  · Discussed with patient and nursing staff.  · Anticipate discharge to SNF pending bed availability    Mina Gomez MD  [facility]  04/12/21  09:08 EDT     I wore protective equipment throughout this patient  encounter including a face mask, gloves and protective eyewear.  Hand hygiene was performed before donning protective equipment and after removal when leaving the room.

## 2021-04-12 NOTE — PLAN OF CARE
Goal Outcome Evaluation:  Plan of Care Reviewed With: patient  Progress: improving  Outcome Summary: PT IS POD #6. PAIN WELL CONTROLLED WITH PO PAIN MEDS. CIWA SCORES IMPROVING BUT CONTINUES WITH CONFUSION. PLANS TO D/C TO REHAB.

## 2021-04-12 NOTE — NURSING NOTE
Patient attempting to get out of bed. Stating he is 'at a  home, and needs to get out'.  Unable to reorient patient. Patient is distrustful of staff.  Called Pat (patient's significant other).  Patient allowed staff to help him back to bed after speaking with Pat.
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
## 2021-04-13 LAB
ANION GAP SERPL CALCULATED.3IONS-SCNC: 8.7 MMOL/L (ref 5–15)
BUN SERPL-MCNC: 10 MG/DL (ref 8–23)
BUN/CREAT SERPL: 13.9 (ref 7–25)
CALCIUM SPEC-SCNC: 8.1 MG/DL (ref 8.6–10.5)
CHLORIDE SERPL-SCNC: 100 MMOL/L (ref 98–107)
CO2 SERPL-SCNC: 28.3 MMOL/L (ref 22–29)
CREAT SERPL-MCNC: 0.72 MG/DL (ref 0.76–1.27)
DEPRECATED RDW RBC AUTO: 46.6 FL (ref 37–54)
ERYTHROCYTE [DISTWIDTH] IN BLOOD BY AUTOMATED COUNT: 12.7 % (ref 12.3–15.4)
GFR SERPL CREATININE-BSD FRML MDRD: 107 ML/MIN/1.73
GLUCOSE SERPL-MCNC: 84 MG/DL (ref 65–99)
HCT VFR BLD AUTO: 24.7 % (ref 37.5–51)
HGB BLD-MCNC: 8.4 G/DL (ref 13–17.7)
MCH RBC QN AUTO: 34.3 PG (ref 26.6–33)
MCHC RBC AUTO-ENTMCNC: 34 G/DL (ref 31.5–35.7)
MCV RBC AUTO: 100.8 FL (ref 79–97)
PLATELET # BLD AUTO: 388 10*3/MM3 (ref 140–450)
PMV BLD AUTO: 8.7 FL (ref 6–12)
POTASSIUM SERPL-SCNC: 3.9 MMOL/L (ref 3.5–5.2)
RBC # BLD AUTO: 2.45 10*6/MM3 (ref 4.14–5.8)
SODIUM SERPL-SCNC: 137 MMOL/L (ref 136–145)
WBC # BLD AUTO: 5.58 10*3/MM3 (ref 3.4–10.8)

## 2021-04-13 PROCEDURE — 80048 BASIC METABOLIC PNL TOTAL CA: CPT | Performed by: ORTHOPAEDIC SURGERY

## 2021-04-13 PROCEDURE — 85027 COMPLETE CBC AUTOMATED: CPT | Performed by: ORTHOPAEDIC SURGERY

## 2021-04-13 RX ORDER — ASPIRIN 81 MG/1
81 TABLET ORAL DAILY
Qty: 30 TABLET | Refills: 0 | Status: SHIPPED | OUTPATIENT
Start: 2021-04-14

## 2021-04-13 RX ORDER — OXYCODONE HYDROCHLORIDE AND ACETAMINOPHEN 5; 325 MG/1; MG/1
1 TABLET ORAL EVERY 4 HOURS PRN
Qty: 12 TABLET | Refills: 0 | Status: SHIPPED | OUTPATIENT
Start: 2021-04-13

## 2021-04-13 RX ORDER — BISACODYL 10 MG
10 SUPPOSITORY, RECTAL RECTAL DAILY
Status: DISCONTINUED | OUTPATIENT
Start: 2021-04-13 | End: 2021-04-14 | Stop reason: HOSPADM

## 2021-04-13 RX ORDER — ACETAMINOPHEN 325 MG/1
650 TABLET ORAL EVERY 6 HOURS PRN
Status: DISCONTINUED | OUTPATIENT
Start: 2021-04-13 | End: 2021-04-14 | Stop reason: HOSPADM

## 2021-04-13 RX ORDER — AMOXICILLIN 250 MG
2 CAPSULE ORAL 2 TIMES DAILY
Status: DISCONTINUED | OUTPATIENT
Start: 2021-04-13 | End: 2021-04-14 | Stop reason: HOSPADM

## 2021-04-13 RX ADMIN — CARBAMAZEPINE 400 MG: 200 TABLET ORAL at 08:42

## 2021-04-13 RX ADMIN — BISACODYL 10 MG: 10 SUPPOSITORY RECTAL at 15:56

## 2021-04-13 RX ADMIN — Medication 1000 MCG: at 08:42

## 2021-04-13 RX ADMIN — POLYETHYLENE GLYCOL 3350 17 G: 17 POWDER, FOR SOLUTION ORAL at 08:42

## 2021-04-13 RX ADMIN — SODIUM CHLORIDE, PRESERVATIVE FREE 10 ML: 5 INJECTION INTRAVENOUS at 21:30

## 2021-04-13 RX ADMIN — CARBAMAZEPINE 400 MG: 200 TABLET ORAL at 21:31

## 2021-04-13 RX ADMIN — DOCUSATE SODIUM 50MG AND SENNOSIDES 8.6MG 2 TABLET: 8.6; 5 TABLET, FILM COATED ORAL at 08:41

## 2021-04-13 RX ADMIN — FAMOTIDINE 20 MG: 20 TABLET, FILM COATED ORAL at 08:42

## 2021-04-13 RX ADMIN — APIXABAN 2.5 MG: 2.5 TABLET, FILM COATED ORAL at 21:31

## 2021-04-13 RX ADMIN — OXYCODONE HYDROCHLORIDE AND ACETAMINOPHEN 1 TABLET: 5; 325 TABLET ORAL at 15:56

## 2021-04-13 RX ADMIN — ACETAMINOPHEN 650 MG: 325 TABLET ORAL at 21:31

## 2021-04-13 RX ADMIN — TERAZOSIN HYDROCHLORIDE 10 MG: 5 CAPSULE ORAL at 21:31

## 2021-04-13 RX ADMIN — OXYCODONE HYDROCHLORIDE AND ACETAMINOPHEN 2 TABLET: 5; 325 TABLET ORAL at 03:30

## 2021-04-13 RX ADMIN — FAMOTIDINE 20 MG: 20 TABLET, FILM COATED ORAL at 15:57

## 2021-04-13 RX ADMIN — OXYCODONE HYDROCHLORIDE AND ACETAMINOPHEN 1 TABLET: 5; 325 TABLET ORAL at 16:53

## 2021-04-13 RX ADMIN — OXYCODONE HYDROCHLORIDE AND ACETAMINOPHEN 1 TABLET: 5; 325 TABLET ORAL at 08:47

## 2021-04-13 RX ADMIN — SODIUM CHLORIDE, PRESERVATIVE FREE 10 ML: 5 INJECTION INTRAVENOUS at 08:41

## 2021-04-13 RX ADMIN — ASPIRIN 81 MG: 81 TABLET, COATED ORAL at 08:42

## 2021-04-13 RX ADMIN — APIXABAN 2.5 MG: 2.5 TABLET, FILM COATED ORAL at 08:42

## 2021-04-13 RX ADMIN — ATORVASTATIN CALCIUM 40 MG: 20 TABLET, FILM COATED ORAL at 08:42

## 2021-04-13 NOTE — PLAN OF CARE
Goal Outcome Evaluation:  Plan of Care Reviewed With: patient     Outcome Summary: POD#7 OF RIGHT HIP. PATIENT IS CONFUSED. PO PAIN MEDICATION HELPING WITH PAIN. CIWA SCORE STILL AT 5. VOLS PER URINAL.  EDUCATION PROVIDED ON ANXIETY AND SAFETY. NO EVIDENCE OF LEARNING. POSSIBLE D/C TO REHAB. WILL CONTINUE TO MONITOR.

## 2021-04-13 NOTE — PROGRESS NOTES
Continued Stay Note  [facility]     Patient Name: Regan Cavazos  MRN: [id number]  Today's Date: 4/13/2021    Admit Date: 4/3/2021    Discharge Plan     Row Name 04/13/21 1111       Plan    Plan Comments  Received a call from Jackson who spoke with partner/Pat and partner/Pat does not want to bill SNF through Medicare Part A - instead she would like to bill through [facility]. Jackson said CCP is to call the the [facility] (via telephone number on the back of the patient's VA card). CCP called [facility], spoke with Anaid who [facility] [place] [place] ([phone] ext [phone]). Called and left a message for Jeanette/[facility] . Await her call back.    Row Name 04/13/21 1010       Plan    Plan  [facility] -- Accepted & Pre-cert Pending.    Patient/Family in Agreement with Plan  yes    Plan Comments  Spoke with [facility] who said the patient will need to bill his SNF under is Medicare Part A. Called and left a message for the patient's partner/Pat to discuss with her. Received a call back from Pat/partner who is agreeable to SNF billing the patient's Medicare Part A. Updated Jackson, also asked Kristyn to call the partner/Pat to discuss the details of billing SNF under Medicare Part A vs the patient's VA, and Jackson is agreeable. No other needs identified.        Discharge Codes    No documentation.             Bettina Blake RN
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
## 2021-04-13 NOTE — PROGRESS NOTES
Continued Stay Note  [facility]     Patient Name: Regan Cavazos  MRN: [id number]  Today's Date: 4/13/2021    Admit Date: 4/3/2021    Discharge Plan     Row Name 04/13/21 1143       Plan    Plan  [facility] -- Accepted.    Patient/Family in Agreement with Plan  yes    Plan Comments  Spoke with Jeanette/[facility]  who said since the patient is only 30% service connected with the VA and has a secondary payer source (Medicare Part A), the VA will not pay for the patient to go to SNF. Updated partner/Pat who is agreeable. Jackson [facility] who is agreeable and states the patient will not need pre-cert, she has a SNF bed for him and the patient may d/c to SNF anytime. Patient will need an ambulance to transport at d/c. Updated Josselin RN/[facility] Liaison who will discuss with Dr. Gomez. No other needs identified. Await d/c when medically appropriate.    Row Name 04/13/21 1111       Plan    Plan Comments  Received a call from Jackson who spoke with partner/Pat and partner/Pat does not want to bill SNF through Medicare Part A - instead she would like to bill through VA. Jackson said CCP is to call the the VA (via telephone number on the back of the patient's VA card). CCP called VA, spoke with Anaid who [facility] [place] [place] ([phone]). Called and left a message for Nick . Await her call back.    Row Name 04/13/21 1010       Plan    Plan  [facility] -- Accepted & Pre-cert Pending.    Patient/Family in Agreement with Plan  yes    Plan Comments  Spoke with Jackson [facility] who said the patient will need to bill his SNF under is Medicare Part A. Called and left a message for the patient's partner/Pat to discuss with her. Received a call back from Pat/partner who is agreeable to SNF billing the patient's Medicare Part A. Updated Jackson, also asked Kristyn to call  the partner/Pat to discuss the details of billing SNF under Medicare Part A vs the patient's VA, and Kristyn/[facility] is agreeable. No other needs identified.        Discharge Codes    No documentation.             Bettina Blake RN

## 2021-04-13 NOTE — PLAN OF CARE
Goal Outcome Evaluation:  Plan of Care Reviewed With: patient  Progress: improving  Outcome Summary: Pt is 75 y/o male pod 7 right THR. Dressing is cdi, vss, n/v intact. Pt is more

## 2021-04-14 VITALS
BODY MASS INDEX: 28.05 KG/M2 | WEIGHT: 211.64 LBS | HEIGHT: 73 IN | OXYGEN SATURATION: 98 % | RESPIRATION RATE: 16 BRPM | DIASTOLIC BLOOD PRESSURE: 61 MMHG | SYSTOLIC BLOOD PRESSURE: 112 MMHG | HEART RATE: 86 BPM | TEMPERATURE: 97.4 F

## 2021-04-14 LAB
ANION GAP SERPL CALCULATED.3IONS-SCNC: 8.1 MMOL/L (ref 5–15)
BUN SERPL-MCNC: 8 MG/DL (ref 8–23)
BUN/CREAT SERPL: 11.6 (ref 7–25)
CALCIUM SPEC-SCNC: 8.6 MG/DL (ref 8.6–10.5)
CHLORIDE SERPL-SCNC: 100 MMOL/L (ref 98–107)
CO2 SERPL-SCNC: 27.9 MMOL/L (ref 22–29)
CREAT SERPL-MCNC: 0.69 MG/DL (ref 0.76–1.27)
DEPRECATED RDW RBC AUTO: 47.3 FL (ref 37–54)
ERYTHROCYTE [DISTWIDTH] IN BLOOD BY AUTOMATED COUNT: 12.8 % (ref 12.3–15.4)
GFR SERPL CREATININE-BSD FRML MDRD: 112 ML/MIN/1.73
GLUCOSE SERPL-MCNC: 83 MG/DL (ref 65–99)
HCT VFR BLD AUTO: 26.7 % (ref 37.5–51)
HGB BLD-MCNC: 8.7 G/DL (ref 13–17.7)
MCH RBC QN AUTO: 33.6 PG (ref 26.6–33)
MCHC RBC AUTO-ENTMCNC: 32.6 G/DL (ref 31.5–35.7)
MCV RBC AUTO: 103.1 FL (ref 79–97)
PLATELET # BLD AUTO: 467 10*3/MM3 (ref 140–450)
PMV BLD AUTO: 8.8 FL (ref 6–12)
POTASSIUM SERPL-SCNC: 4 MMOL/L (ref 3.5–5.2)
RBC # BLD AUTO: 2.59 10*6/MM3 (ref 4.14–5.8)
SODIUM SERPL-SCNC: 136 MMOL/L (ref 136–145)
WBC # BLD AUTO: 6.39 10*3/MM3 (ref 3.4–10.8)

## 2021-04-14 PROCEDURE — 80048 BASIC METABOLIC PNL TOTAL CA: CPT | Performed by: ORTHOPAEDIC SURGERY

## 2021-04-14 PROCEDURE — 85027 COMPLETE CBC AUTOMATED: CPT | Performed by: ORTHOPAEDIC SURGERY

## 2021-04-14 RX ADMIN — ATORVASTATIN CALCIUM 40 MG: 20 TABLET, FILM COATED ORAL at 08:12

## 2021-04-14 RX ADMIN — APIXABAN 2.5 MG: 2.5 TABLET, FILM COATED ORAL at 08:12

## 2021-04-14 RX ADMIN — OXYCODONE HYDROCHLORIDE AND ACETAMINOPHEN 1 TABLET: 5; 325 TABLET ORAL at 08:12

## 2021-04-14 RX ADMIN — FAMOTIDINE 20 MG: 20 TABLET, FILM COATED ORAL at 08:12

## 2021-04-14 RX ADMIN — CARBAMAZEPINE 400 MG: 200 TABLET ORAL at 08:12

## 2021-04-14 RX ADMIN — PANTOPRAZOLE SODIUM 40 MG: 40 TABLET, DELAYED RELEASE ORAL at 08:12

## 2021-04-14 RX ADMIN — Medication 1000 MCG: at 08:12

## 2021-04-14 RX ADMIN — ASPIRIN 81 MG: 81 TABLET, COATED ORAL at 08:12

## 2021-04-14 NOTE — PLAN OF CARE
"Goal Outcome Evaluation:  Plan of Care Reviewed With: patient  Progress: improving  Outcome Summary: POD 7 RTH revision. VSS, pain controlled with prn percocet and tylenol - client states: \"those other pills (percocet) make me feel loopy, let's just try the tylenol.\" ambulates fairly well with assist x1, walker and gait belt. plans to d/c to SNF for rehab, 4/14 via ambulance. will continue to monitor.  "

## 2021-04-14 NOTE — PLAN OF CARE
Goal Outcome Evaluation:  Plan of Care Reviewed With: patient  Progress: improving  Outcome Summary: Pt is 77 y/o male, pod 7 right THR. Dressing is cdi, vss, n/v intact. Pt appears to be more oriented and recalls periods of confusion and hallucination. Awaiting bowel movement for discharge, pt has not had a bowel movement since admission. Pain controlled with po medications. Educated pt on falls prevention. Will continue to monitor for seizure activity.
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
## 2021-04-14 NOTE — PROGRESS NOTES
"Pain a little bit better controlled.  He is much more alert and with it today.  Plan for discharge to SNF soon, okay from my standpoint    R \"Garry\" Cayetano WIGGINS MD  Orthopaedic Surgery  [facility]  [phone] - [place] Office  [phone] - [place] Office    "

## 2021-04-14 NOTE — PROGRESS NOTES
Continued Stay Note  [facility]     Patient Name: Regan Cavazos  MRN: [id number]  Today's Date: 4/14/2021    Admit Date: 4/3/2021    Discharge Plan     Row Name 04/14/21 1040       Plan    Plan  [facility] -- Accepted.    Patient/Family in Agreement with Plan  yes    Plan Comments  Spoke with Kelsey/[facility] who asked if the patient's transport time can be changed to 1145. Discussed with the patient's nurse/BENI Marques and partner/Pat who are both agreeable. Updated Kelsey/[facility]. Plan is for the patient to d/c to [facility] today via [facility] at 1145. Per [facility] documentation, patient had a bowel movement 4/14/2021 at 0120. No other needs identified. Packet is on the chart.    Final Discharge Disposition Code  03 - skilled nursing facility (SNF)    Final Note  Patient is being discharged to [facility]. [facility] to transport.    Row Name 04/14/21 0934       Plan    Plan  [facility] -- Accepted.    Patient/Family in Agreement with Plan  yes        Discharge Codes    No documentation.       Expected Discharge Date and Time     Expected Discharge Date Expected Discharge Time    Apr 13, 2021             Bettina Blake RN

## 2021-09-16 ENCOUNTER — OFFICE VISIT (OUTPATIENT)
Dept: ORTHOPEDIC SURGERY | Facility: CLINIC | Age: 76
End: 2021-09-16

## 2021-09-16 VITALS — TEMPERATURE: 98 F | HEIGHT: 73 IN | WEIGHT: 211 LBS | BODY MASS INDEX: 27.96 KG/M2

## 2021-09-16 DIAGNOSIS — Z96.641 HISTORY OF TOTAL RIGHT HIP ARTHROPLASTY: Primary | ICD-10-CM

## 2021-09-16 PROCEDURE — 73502 X-RAY EXAM HIP UNI 2-3 VIEWS: CPT | Performed by: ORTHOPAEDIC SURGERY

## 2021-09-16 PROCEDURE — 99212 OFFICE O/P EST SF 10 MIN: CPT | Performed by: ORTHOPAEDIC SURGERY

## 2023-08-15 NOTE — PROGRESS NOTES
Name: Regan Cavazos ADMIT: 4/3/2021   : 1945  PCP: Latricia Schulz APRN    MRN: [id number] LOS: 5 days   AGE/SEX: 76 y.o. male  ROOM: [facility]     Subjective   Subjective   Sleeping when I went to interview him.  Very lethargic.  Has not been receiving Ativan for alcohol withdrawal    Review of Systems   Constitutional: Negative.    Respiratory: Negative.    Cardiovascular: Negative.    Gastrointestinal: Negative.         Objective   Objective   Vital Signs  Temp:  [98 °F (36.7 °C)-99.3 °F (37.4 °C)] 98.2 °F (36.8 °C)  Heart Rate:  [70-84] 78  Resp:  [16-20] 18  BP: (100-130)/(57-74) 100/57  SpO2:  [94 %-100 %] 96 %  on  Flow (L/min):  [2] 2;   Device (Oxygen Therapy): room air  Body mass index is 27.93 kg/m².  Physical Exam  Constitutional:       General: He is not in acute distress.     Comments: Chronically ill appearing   HENT:      Head: Normocephalic and atraumatic.   Eyes:      Extraocular Movements: Extraocular movements intact.      Pupils: Pupils are equal, round, and reactive to light.   Cardiovascular:      Rate and Rhythm: Normal rate and regular rhythm.   Pulmonary:      Effort: Pulmonary effort is normal.      Breath sounds: Normal breath sounds.   Abdominal:      General: There is no distension.      Palpations: Abdomen is soft.      Tenderness: There is no abdominal tenderness.   Skin:     General: Skin is warm and dry.         Results Review     I reviewed the patient's new clinical results.  Results from last 7 days   Lab Units 21  0640 21  0521   WBC 10*3/mm3 6.41 7.87 5.18 5.28   HEMOGLOBIN g/dL 9.5* 10.1* 11.1* 11.8*   PLATELETS 10*3/mm3 188 196 164 169     Results from last 7 days   Lab Units 21  0651 21  0640 21  0521   SODIUM mmol/L 137 133* 137 135*   POTASSIUM mmol/L 4.5 4.6 4.4 4.4   CHLORIDE mmol/L 99 99 100 100   CO2 mmol/L 28.2 25.4 29.7* 29.0   BUN mg/dL 13 11 11 14   CREATININE mg/dL 0.88 0.76  0.75* 0.98   GLUCOSE mg/dL 96 124* 85 92   Estimated Creatinine Clearance: 89.9 mL/min (by C-G formula based on SCr of 0.88 mg/dL).  Results from last 7 days   Lab Units 04/03/21  1644   ALBUMIN g/dL 3.60   BILIRUBIN mg/dL 0.6   ALK PHOS U/L 50   AST (SGOT) U/L 18   ALT (SGPT) U/L 10     Results from last 7 days   Lab Units 04/08/21  0445 04/07/21  0651 04/06/21  0640 04/05/21  0521 04/03/21  1644   CALCIUM mg/dL 8.3* 8.4* 8.3* 8.2* 8.0*   ALBUMIN g/dL  --   --   --   --  3.60       COVID19   Date Value Ref Range Status   04/06/2021 Not Detected Not Detected - Ref. Range Final   04/03/2021 Not Detected Not Detected - Ref. Range Final     No results found for: HGBA1C, POCGLU    XR Hip With or Without Pelvis 1 View Right  Narrative: AP VIEW OF THE PELVIS PLUS 2 VIEWS RIGHT HIP     HISTORY: Postop     COMPARISON: 04/05/2021.      FINDINGS:  The patient has undergone fixation of the previously identified  periprosthetic fracture involving the proximal right femur. There is a  transversely oriented lucency seen involving the proximal femoral  diaphysis along the more distal aspect of the hardware which wasn't  clearly seen on the earlier images. It is involving the lateral cortex  of the right femur. Additional fracture is not excluded. There is soft  tissue swelling and subcutaneous gas overlying the right lateral thigh.  Right hip arthroplasty remains in place. No fracture is seen on the  left. There are extensive postsurgical changes involving the lumbar  spine. [place] catheter is present.     Impression: Postoperative changes, as noted above.     This report was finalized on 4/6/2021 10:13 PM by Dr. Marylou Kilpatrick M.D.       Scheduled Medications  apixaban, 2.5 mg, Oral, Q12H  aspirin, 81 mg, Oral, Daily  atorvastatin, 40 mg, Oral, Daily  carBAMazepine, 400 mg, Oral, BID  famotidine, 20 mg, Oral, BID AC  fexofenadine, 180 mg, Oral, Daily  fluticasone, 2 spray, Each Nare, Daily  modafinil, 200 mg, Oral,  Daily  pantoprazole, 40 mg, Oral, QAM  sodium chloride, 10 mL, Intravenous, Q12H  terazosin, 10 mg, Oral, Nightly  vitamin B-12, 1,000 mcg, Oral, Daily    Infusions  lactated ringers, 9 mL/hr, Last Rate: Stopped (04/07/21 2002)    Diet  Diet Regular       Assessment/Plan     Active Hospital Problems    Diagnosis  POA   • **Broken internal right hip prosthesis (CMS/[facility]) [T84.010A]  Yes   • Closed right hip fracture, initial encounter (CMS/HCC) [S72.001A]  Yes   • GERD (gastroesophageal reflux disease) [K21.9]  Yes   • Seizures (CMS/[facility]) [R56.9]  Yes   • BPH (benign prostatic hyperplasia) [N40.0]  Yes   • Coronary artery disease [I25.10]  Yes      Resolved Hospital Problems   No resolved problems to display.       76 y.o. male admitted with Broken internal right hip prosthesis (CMS/[facility]).       Right hip prosthesis fracture-ortho is consult.  Underwent total hip arthroplasty revision on 4/6  DVT prophylaxis with Eliquis 2.5 twice daily for 30 days  Resume ASA     Alcohol dependence with intoxication-  monitor CIWA, thiamine, folate, mvi. Ativan for withdrawal.     Allergic rhinitis-he takes Allegra at home which we do not have on formulary here, and he is allergic to our formulary second-generation antihistamines.  I do not want to give him a first generation antihistamine because of his confusion.  It will not provide immediate relief, but will start fluticasone nasal spray today.     Left shoulder pain-x-ray shows chronic fracture of the inferior aspect of the left scapula, old left clavicle fracture, and multiple old left sided rib fractures, but no acute fracture or subluxation of the left shoulder is seen.     CAD s/p CABG in 2016  Continue aspirin, statin.  Not on any beta-blockers from chart review    Carotid artery stenosis  DDD  OA  GERD  TBI with seizure disorder  Anxiety/depression  History of prostate cancer s/p xrt  ADHD  HLD  BPH     [facility] medication list as [facility]  [facility] is list below:    Home medications: asa 81, carbamazepine 400 bid, cyanocobalamin 1000 daily, diclofenac 1% topical gel, famotidine 20 mg bid, fexofenadine 180 daily, modafinil 200 daily, mvi daily, pantoprazole 40 mg daily, simvastatin 40 mg daily, terazosin 10 mg daily.      · Eliquis 2.5mg BID  for DVT prophylaxis.  · Full code.  · Discussed with patient and nursing staff.  · Anticipate discharge to SNF, likely tomorrow     Mina Gomez MD  [facility]  04/08/21  14:21 EDT     I wore protective equipment throughout this patient encounter including a face mask, gloves and protective eyewear.  Hand hygiene was performed before donning protective equipment and after removal when leaving the room.       Stelara Pregnancy And Lactation Text: This medication is Pregnancy Category B and is considered safe during pregnancy. It is unknown if this medication is excreted in breast milk.

## (undated) DEVICE — ADHS SKIN DERMABOND

## (undated) DEVICE — GLV SURG BIOGEL LTX PF 7 1/2

## (undated) DEVICE — ADHS SKIN DERMABOND TOP ADVANCED

## (undated) DEVICE — DRAPE,HIP,W/POUCHES,STERILE: Brand: MEDLINE

## (undated) DEVICE — PK NEURO SPINE 40

## (undated) DEVICE — DRSNG WND GZ CURAD OIL EMULSION 3X8IN LF STRL 1PK

## (undated) DEVICE — ENCORE® LATEX ORTHO SIZE 8, STERILE LATEX POWDER-FREE SURGICAL GLOVE: Brand: ENCORE

## (undated) DEVICE — DISPOSABLE BIPOLAR CABLE 12FT. (3.6M): Brand: KIRWAN

## (undated) DEVICE — FLOSEAL MATRIX IS INDICATED IN SURGICAL PROCEDURES (OTHER THAN IN OPHTHALMIC) AS AN ADJUNCT TO HEMOSTASIS WHEN CONTROL OF BLEEDING BY LIGATURE OR CONVENTIONAL PROCEDURES IS INEFFECTIVE OR IMPRACTICAL.: Brand: FLOSEAL HEMOSTATIC MATRIX

## (undated) DEVICE — SUT VIC 1 OS8 27IN J699H

## (undated) DEVICE — HANDPIECE SET WITH COAXIAL HIGH FLOW TIP AND SUCTION TUBE: Brand: INTERPULSE

## (undated) DEVICE — SOL NACL 0.9PCT 1000ML

## (undated) DEVICE — DUAL CUT SAGITTAL BLADE

## (undated) DEVICE — PK ATS CUST W CARDIOTOMY RESEVOIR

## (undated) DEVICE — SUT ETHIB 2 CV V37 MS/4 30IN MX69G

## (undated) DEVICE — APPL CHLORAPREP HI/LITE 26ML ORNG

## (undated) DEVICE — BONE MARROW ASPIRATION NEEDLES ASPIRATOR KIT 3-HOLE 4 INCHES NDLE

## (undated) DEVICE — ACCORD DUAL ENDED HEX DRIVER: Brand: ACCORD

## (undated) DEVICE — SUT MNCRYL PLS ANTIB UD 4/0 PS2 18IN

## (undated) DEVICE — TRAP FLD MINIVAC MEGADYNE 100ML

## (undated) DEVICE — CODMAN® SURGICAL PATTIES 1/2" X 3" (1.27CM X 7.62CM): Brand: CODMAN®

## (undated) DEVICE — STPLR SKIN VISISTAT WD 35CT

## (undated) DEVICE — SPNG GZ WOVN 4X4IN 12PLY 10/BX STRL

## (undated) DEVICE — ANTIBACTERIAL UNDYED BRAIDED (POLYGLACTIN 910), SYNTHETIC ABSORBABLE SUTURE: Brand: COATED VICRYL

## (undated) DEVICE — SOL ISO/ALC RUB 70PCT 4OZ

## (undated) DEVICE — PK HIP TOTL 40

## (undated) DEVICE — SPNG LAP 18X18IN LF STRL PK/5

## (undated) DEVICE — GLV SURG SIGNATURE ESSENTIAL PF LTX SZ8.5

## (undated) DEVICE — GAUZE,SPONGE,FLUFF,6"X6.75",STRL,10/TRAY: Brand: MEDLINE

## (undated) DEVICE — GLV SURG BIOGEL LTX PF 7

## (undated) DEVICE — APPL DURAPREP IODOPHOR APL 26ML

## (undated) DEVICE — TOTAL TRAY, 16FR 10ML SIL FOLEY, URN: Brand: MEDLINE

## (undated) DEVICE — GOWN,REINF,POLY,ECL,PP SLV,XL: Brand: MEDLINE

## (undated) DEVICE — GLV SURG PREMIERPRO ORTHO LTX PF SZ8.5 BRN

## (undated) DEVICE — GLV SURG SENSICARE PI MIC PF SZ7 LF STRL

## (undated) DEVICE — OIL CARTRIDGE: Brand: CORE, MAESTRO

## (undated) DEVICE — SPONGE,LAP,12"X12",XR,ST,5/PK,40PK/CS: Brand: MEDLINE

## (undated) DEVICE — MEDI-VAC YANKAUER SUCTION HANDLE W/BULBOUS TIP: Brand: CARDINAL HEALTH

## (undated) DEVICE — PENCL E/S ULTRAVAC TELESCP NOSE HOLSTR 10FT

## (undated) DEVICE — OCCLUSIVE GAUZE STRIP,3% BISMUTH TRIBROMOPHENATE IN PETROLATUM BLEND: Brand: XEROFORM

## (undated) DEVICE — PICO 7 10CM X 30CM: Brand: PICO™ 7

## (undated) DEVICE — SYR LUERLOK 20CC BX/50

## (undated) DEVICE — SUT VIC 1 CT1 36IN J947H

## (undated) DEVICE — DIFFUSER: Brand: CORE, MAESTRO

## (undated) DEVICE — PAD,ABDOMINAL,8"X10",ST,LF: Brand: MEDLINE

## (undated) DEVICE — 6.0MM PRECISION ROUND